# Patient Record
Sex: FEMALE | Race: WHITE | NOT HISPANIC OR LATINO | Employment: OTHER | URBAN - METROPOLITAN AREA
[De-identification: names, ages, dates, MRNs, and addresses within clinical notes are randomized per-mention and may not be internally consistent; named-entity substitution may affect disease eponyms.]

---

## 2017-01-25 ENCOUNTER — OFFICE VISIT (OUTPATIENT)
Dept: FAMILY MEDICINE | Facility: CLINIC | Age: 67
End: 2017-01-25
Payer: COMMERCIAL

## 2017-01-25 VITALS
WEIGHT: 195 LBS | RESPIRATION RATE: 16 BRPM | HEART RATE: 72 BPM | HEIGHT: 64 IN | SYSTOLIC BLOOD PRESSURE: 134 MMHG | OXYGEN SATURATION: 99 % | DIASTOLIC BLOOD PRESSURE: 68 MMHG | BODY MASS INDEX: 33.29 KG/M2 | TEMPERATURE: 97.5 F

## 2017-01-25 DIAGNOSIS — G91.9 HYDROCEPHALUS (H): ICD-10-CM

## 2017-01-25 DIAGNOSIS — R60.0 BILATERAL EDEMA OF LOWER EXTREMITY: Primary | ICD-10-CM

## 2017-01-25 DIAGNOSIS — N20.0 NEPHROLITHIASIS: ICD-10-CM

## 2017-01-25 PROCEDURE — 99214 OFFICE O/P EST MOD 30 MIN: CPT | Performed by: INTERNAL MEDICINE

## 2017-01-25 NOTE — MR AVS SNAPSHOT
After Visit Summary   1/25/2017    Jessica Qiu    MRN: [id number]           Patient Information     Date Of Birth          1950        Visit Information        Provider Department      1/25/2017 2:00 PM Charlene Huynh MD [facility]        Today's Diagnoses     Bilateral edema of lower extremity    -  1     Nephrolithiasis         Hydrocephalus            Follow-ups after your visit        Your next 10 appointments already scheduled     Jan 27, 2017  9:45 AM   LAB with  LAB   Saman Briceño ([facility] [place])    [address] [address] [address] [phone]           Patient must bring picture ID.  Patient should be prepared to give a urine specimen  Please do not eat 10-12 hours before your appointment if you are coming in fasting for labs on lipids, cholesterol, or glucose (sugar).  Pregnant women should follow their Care Team instructions. Water with medications is okay. Do not drink coffee or other fluids.   If you have concerns about taking  your medications, please ask at office or if scheduling via [employer], send a message by clicking on Secure Messaging, Message Your Care Team.              Future tests that were ordered for you today     Open Future Orders        Priority Expected Expires Ordered    Comprehensive metabolic panel Routine  3/25/2017 1/25/2017    Lipid panel reflex to direct LDL Routine  3/25/2017 1/25/2017    UA reflex to Microscopic and Culture Routine  3/25/2017 1/25/2017    Echocardiogram Complete Routine  1/25/2018 1/25/2017    CBC with platelets Routine  3/25/2017 1/25/2017            Who to contact     If you have questions or need follow up information about today's clinic visit or your schedule please contact [facility] directly at [phone].  Normal or non-critical lab and imaging results will be communicated to you by MyChart, letter or phone within 4 business days after the  "clinic has received the results. If you do not hear from us within 7 days, please contact the clinic through [employer] or phone. If you have a critical or abnormal lab result, we will notify you by phone as soon as possible.  Submit refill requests through [employer]book or call your pharmacy and they will forward the refill request to us. Please allow 3 business days for your refill to be completed.          Additional Information About Your Visit        [employer] Information     Rice[employer] gives you secure access to your electronic health record. If you see a primary care provider, you can also send messages to your care team and make appointments. If you have questions, please call your primary care clinic.  If you do not have a primary care provider, please call [phone] and they will assist you.        Care EveryWhere ID     This is your Care EveryWhere ID. This could be used by other organizations to access your [place] medical records  [id number]        Your Vitals Were     Pulse Temperature Respirations Height BMI (Body Mass Index) Pulse Oximetry    72 97.5  F (36.4  C) (Oral) 16 5' 3.5\" (1.613 m) 34.00 kg/m2 99%       Blood Pressure from Last 3 Encounters:   01/25/17 134/68   08/17/16 131/79   07/20/16 122/64    Weight from Last 3 Encounters:   01/25/17 195 lb (88.451 kg)   08/17/16 160 lb (72.576 kg)   07/20/16 184 lb 1.6 oz (83.507 kg)               Primary Care Provider Office Phone # Fax #    Charlene Huynh [phone]       [facility] [address] [facility] [address]        Thank you!     Thank you for choosing [facility]  for your care. Our goal is always to provide you with excellent care. Hearing back from our patients is one way we can continue to improve our services. Please take a few minutes to complete the written survey that you may receive in the mail after your visit with us. Thank you!             Your Updated Medication List - Protect " others around you: Learn how to safely use, store and throw away your medicines at www.disposemymeds.org.          This list is accurate as of: 1/25/17  2:39 PM.  Always use your most recent med list.                   Brand Name Dispense Instructions for use    allopurinol 300 MG tablet    ZYLOPRIM    90 tablet    Take 1 tablet (300 mg) by mouth daily       multivitamin, therapeutic with minerals Tabs tablet      Take 1 tablet by mouth daily       OMEGA-3 FISH OIL PO      Take 500 mg by mouth daily

## 2017-01-25 NOTE — PROGRESS NOTES
SUBJECTIVE:                                                    Jessica Qiu is a 66 year old female who presents to clinic today for the following health issues:      Peripheral Edema:   Onset: Monday morning     Description:   Increased swelling in lower leg up to the kneecap bilaterally. Legs, ankles, and feet are sensitive. Today, symptoms appear to be improved in comparison with Monday. When asked, the patient has been feeling chest congestion and shortness of breath.    Intensity: Moderate    Progression of Symptoms:  Worsening, Monday morning she tried to put her shoes on and they were very tight.    Accompanying Signs & Symptoms:  Swelling and tenderness   Previous history of similar problem:   No    Precipitating factors:   Worsening throughout the day, especially while on her feet.    Alleviating factors:  Improvement noticed with elevation - decreased swelling in the evening.      Therapies Tried and outcome: None specifically for swelling. Sudafed for nasal and chest congestion.      Medications:  The patient reports having stopped taking a couple of her medications. She has recently stopped taking Ditropan for overactive bladder, but still keeps a few on hand in case she needs it. Additionally, she has stopped taking Citalopram and notes that she feels better without it. When asked, she reported having stopped taking Buproprion a couple of years ago. The patient continues taking Allopurinol everyday to prevent additional incidents of nephrolithiasis.     Problem list and histories reviewed & adjusted, as indicated.  Additional history: as documented    Labs reviewed in EPIC  Problem list, Medication list, Allergies, and Medical/Social/Surgical histories reviewed in [facility] and updated as appropriate.      REVIEW OF SYSTEMS:  C: NEGATIVE for fever, chills, or change in weight  I: POSITIVE for skin sensitivity of the legs, ankles, and feet; NEGATIVE for worrisome rashes, lesions, or moles  E: NEGATIVE  "for vision changes or irritation  ENT: POSITIVE for nasal congestion; NEGATIVE for ear, mouth, or throat problems  R: POSITIVE for shortness of breath; NEGATIVE for significant cough  CV: POSITIVE for peripheral edema; NEGATIVE for chest pain or palpitations  : Hx of Nephrolithiasis - use of Allopurinol; Hx of Overactive Bladder- no longer taking Ditropan; NEGATIVE for unusual vaginal symptoms  GI: NEGATIVE for nausea, abdominal pain, heartburn, or change in bowel habits  M: NEGATIVE for significant arthralgias or myalgia  N: NEGATIVE for weakness, dizziness or paresthesias  P: hx of Anxiety and Depression - no longer taking Buproprion or Citalopram; NEGATIVE for changes in mood or affect    This document serves as a record of the services and decisions personally performed and made by Charlene Huynh MD. It was created on her behalf by Carlota Benitez, a trained medical scribe. The creation of this document is based the provider's statements to the medical scribe.  Carlota Benitez, January 25, 2017 2:12 PM     OBJECTIVE:                                                    /68 mmHg  Pulse 72  Temp(Src) 97.5  F (36.4  C) (Oral)  Resp 16  Ht 1.613 m (5' 3.5\")  Wt 88.451 kg (195 lb)  BMI 34.00 kg/m2  SpO2 99%  Body mass index is 34 kg/(m^2).    EXAM:  GENERAL: Patient appears healthy, alert and not distressed.  EYES: Eyes appear grossly normal to inspection, with normal conjunctivae and sclerae  NECK: No adenopathy present, no asymmetry, masses, or scars noted, thyroid is normal to palpation  RESP: Lungs are clear to auscultation - no rales, rhonchi or wheezes present  CV: Regular rate and rhythm, normal S1 S2 heart sounds, no ectopy, peripheral pulses normal, no carotid bruit; 1+ peripheral edema noted bilaterally  ABDOMEN: Soft, nontender, no hepatosplenomegaly, no masses, normal bowel sounds  MS: No gross musculoskeletal defects noted, no edema, gait is age appropriate without ataxia  SKIN: No suspicious " rashes, lesions, or moles.  NEURO: Patient exhibits normal strength and tone, normal speech and mentation  PSYCH: Mentation appears normal, affect is normal/bright      Diagnostic Test Results:  No results found for this or any previous visit (from the past 24 hour(s)).      ASSESSMENT/PLAN:                                                    (R60.0) Bilateral edema of lower extremity  (primary encounter diagnosis)  Comment: Symptoms began on Monday, but appear better today; Encouraged to monitor salt intake to prevent additional swelling; Labs ordered, patient will return Friday to complete.  Plan: Comprehensive metabolic panel, Lipid panel         reflex to direct LDL, UA reflex to Microscopic         and Culture, Echocardiogram Complete, CBC with platelets          (N20.0) Nephrolithiasis  Comment: Daily Allopurinol 300 MG tablet has been effective in preventing recurrence of kidney stones; No changes in medication or dosing.  Plan: Comprehensive metabolic panel          (G91.9) Hydrocephalus  Comment:  Shunt placed 3/24/2016 at [facility]; Shunt is effective; Currently uses a walker for ambulation and to decrease risk for falls; Will continue to monitor.  Plan: Comprehensive metabolic panel            MEDICATIONS:   No orders of the defined types were placed in this encounter.     Medications Discontinued During This Encounter   Medication Reason     buPROPion (ZYBAN) 150 MG 12 hr tablet Stopped by Patient     oxybutynin (DITROPAN) 5 MG tablet Stopped by Patient     citalopram (CELEXA) 20 MG tablet Stopped by Patient          - Continue other medications without change  FUTURE LABS:       - Schedule a fasting blood draw for Friday, January 27th.      The information in this document, created by a medical scribe for me, accurately reflects the services I personally performed and the decisions made by me. I have reviewed and approved this document for accuracy.  Dr. Charlene Huynh, 2:35 PM, January 25,  2017    Charlene Huynh MD  Internal Medicine  [facility]

## 2017-01-25 NOTE — NURSING NOTE
"Chief Complaint   Patient presents with     Edema     for the past 2 days have noted increased swelling in both legs up to just above the knees   skin feels sensitive and legs, ankles and feet are sensitive        Initial /68 mmHg  Pulse 72  Temp(Src) 97.5  F (36.4  C) (Oral)  Resp 16  Ht 5' 3.5\" (1.613 m)  Wt 195 lb (88.451 kg)  BMI 34.00 kg/m2  SpO2 99% Estimated body mass index is 34 kg/(m^2) as calculated from the following:    Height as of this encounter: 5' 3.5\" (1.613 m).    Weight as of this encounter: 195 lb (88.451 kg).  BP completed using cuff size: large    "

## 2017-01-27 DIAGNOSIS — G91.9 HYDROCEPHALUS (H): ICD-10-CM

## 2017-01-27 DIAGNOSIS — R60.0 BILATERAL EDEMA OF LOWER EXTREMITY: ICD-10-CM

## 2017-01-27 DIAGNOSIS — N20.0 NEPHROLITHIASIS: ICD-10-CM

## 2017-01-27 DIAGNOSIS — R53.83 FATIGUE, UNSPECIFIED TYPE: ICD-10-CM

## 2017-01-27 LAB
ALBUMIN UR-MCNC: NEGATIVE MG/DL
APPEARANCE UR: ABNORMAL
BACTERIA #/AREA URNS HPF: ABNORMAL /HPF
BILIRUB UR QL STRIP: NEGATIVE
COLOR UR AUTO: YELLOW
ERYTHROCYTE [DISTWIDTH] IN BLOOD BY AUTOMATED COUNT: 13.8 % (ref 10–15)
GLUCOSE UR STRIP-MCNC: NEGATIVE MG/DL
HCT VFR BLD AUTO: 42.7 % (ref 35–47)
HGB BLD-MCNC: 13.7 G/DL (ref 11.7–15.7)
HGB UR QL STRIP: ABNORMAL
KETONES UR STRIP-MCNC: NEGATIVE MG/DL
LEUKOCYTE ESTERASE UR QL STRIP: ABNORMAL
MCH RBC QN AUTO: 31.3 PG (ref 26.5–33)
MCHC RBC AUTO-ENTMCNC: 32.1 G/DL (ref 31.5–36.5)
MCV RBC AUTO: 98 FL (ref 78–100)
NITRATE UR QL: NEGATIVE
NON-SQ EPI CELLS #/AREA URNS LPF: ABNORMAL /LPF
PH UR STRIP: 5 PH (ref 5–7)
PLATELET # BLD AUTO: 228 10E9/L (ref 150–450)
RBC # BLD AUTO: 4.38 10E12/L (ref 3.8–5.2)
RBC #/AREA URNS AUTO: ABNORMAL /HPF (ref 0–2)
SP GR UR STRIP: 1.02 (ref 1–1.03)
URN SPEC COLLECT METH UR: ABNORMAL
UROBILINOGEN UR STRIP-ACNC: 0.2 EU/DL (ref 0.2–1)
WBC # BLD AUTO: 9.5 10E9/L (ref 4–11)
WBC #/AREA URNS AUTO: ABNORMAL /HPF (ref 0–2)

## 2017-01-27 PROCEDURE — 84443 ASSAY THYROID STIM HORMONE: CPT | Performed by: INTERNAL MEDICINE

## 2017-01-27 PROCEDURE — 81001 URINALYSIS AUTO W/SCOPE: CPT | Performed by: INTERNAL MEDICINE

## 2017-01-27 PROCEDURE — 85027 COMPLETE CBC AUTOMATED: CPT | Performed by: INTERNAL MEDICINE

## 2017-01-27 PROCEDURE — 36415 COLL VENOUS BLD VENIPUNCTURE: CPT | Performed by: INTERNAL MEDICINE

## 2017-01-27 PROCEDURE — 80061 LIPID PANEL: CPT | Performed by: INTERNAL MEDICINE

## 2017-01-27 PROCEDURE — 80053 COMPREHEN METABOLIC PANEL: CPT | Performed by: INTERNAL MEDICINE

## 2017-01-28 LAB
ALBUMIN SERPL-MCNC: 3.8 G/DL (ref 3.4–5)
ALP SERPL-CCNC: 72 U/L (ref 40–150)
ALT SERPL W P-5'-P-CCNC: 35 U/L (ref 0–50)
ANION GAP SERPL CALCULATED.3IONS-SCNC: 8 MMOL/L (ref 3–14)
AST SERPL W P-5'-P-CCNC: 28 U/L (ref 0–45)
BILIRUB SERPL-MCNC: 0.5 MG/DL (ref 0.2–1.3)
BUN SERPL-MCNC: 16 MG/DL (ref 7–30)
CALCIUM SERPL-MCNC: 9.4 MG/DL (ref 8.5–10.1)
CHLORIDE SERPL-SCNC: 107 MMOL/L (ref 94–109)
CHOLEST SERPL-MCNC: 259 MG/DL
CO2 SERPL-SCNC: 26 MMOL/L (ref 20–32)
CREAT SERPL-MCNC: 0.66 MG/DL (ref 0.52–1.04)
GFR SERPL CREATININE-BSD FRML MDRD: 90 ML/MIN/1.7M2
GLUCOSE SERPL-MCNC: 96 MG/DL (ref 70–99)
HDLC SERPL-MCNC: 59 MG/DL
LDLC SERPL CALC-MCNC: 157 MG/DL
NONHDLC SERPL-MCNC: 200 MG/DL
POTASSIUM SERPL-SCNC: 3.7 MMOL/L (ref 3.4–5.3)
PROT SERPL-MCNC: 7.4 G/DL (ref 6.8–8.8)
SODIUM SERPL-SCNC: 141 MMOL/L (ref 133–144)
TRIGL SERPL-MCNC: 217 MG/DL
TSH SERPL DL<=0.005 MIU/L-ACNC: 3.8 MU/L (ref 0.4–4)

## 2017-02-01 ENCOUNTER — HOSPITAL ENCOUNTER (OUTPATIENT)
Dept: CARDIOLOGY | Facility: CLINIC | Age: 67
Discharge: HOME | End: 2017-02-01
Attending: INTERNAL MEDICINE | Admitting: INTERNAL MEDICINE
Payer: COMMERCIAL

## 2017-02-01 DIAGNOSIS — R60.0 BILATERAL EDEMA OF LOWER EXTREMITY: ICD-10-CM

## 2017-02-01 PROCEDURE — 93306 TTE W/DOPPLER COMPLETE: CPT | Mod: 26 | Performed by: INTERNAL MEDICINE

## 2017-02-01 PROCEDURE — 93306 TTE W/DOPPLER COMPLETE: CPT

## 2017-02-03 ENCOUNTER — MYC MEDICAL ADVICE (OUTPATIENT)
Dept: FAMILY MEDICINE | Facility: CLINIC | Age: 67
End: 2017-02-03

## 2017-02-03 NOTE — TELEPHONE ENCOUNTER
Please see VANDOLAY message. Would like comments on ECHO results.     Sandra Barrientos RN, BSN, PHN

## 2017-02-09 NOTE — TELEPHONE ENCOUNTER
Patient calling back today, has not yet heard about results, please advise.  Rylee Ulloa, RN  Triage Nurse

## 2017-02-10 ENCOUNTER — TELEPHONE (OUTPATIENT)
Dept: FAMILY MEDICINE | Facility: CLINIC | Age: 67
End: 2017-02-10

## 2017-02-10 DIAGNOSIS — I35.1 AORTIC REGURGITATION: ICD-10-CM

## 2017-02-10 DIAGNOSIS — I77.810 DILATED AORTIC ROOT (H): Primary | ICD-10-CM

## 2017-02-10 NOTE — TELEPHONE ENCOUNTER
Placed Cardiology referral per verbal order DN. Patient informed of referral.    Danielle Matias RN

## 2017-02-14 ENCOUNTER — OFFICE VISIT (OUTPATIENT)
Dept: CARDIOLOGY | Facility: CLINIC | Age: 67
End: 2017-02-14
Attending: INTERNAL MEDICINE
Payer: COMMERCIAL

## 2017-02-14 VITALS
HEIGHT: 64 IN | BODY MASS INDEX: 32.78 KG/M2 | HEART RATE: 80 BPM | DIASTOLIC BLOOD PRESSURE: 70 MMHG | SYSTOLIC BLOOD PRESSURE: 124 MMHG | WEIGHT: 192 LBS

## 2017-02-14 DIAGNOSIS — M41.25 OTHER IDIOPATHIC SCOLIOSIS, THORACOLUMBAR REGION: ICD-10-CM

## 2017-02-14 DIAGNOSIS — I35.1 NONRHEUMATIC AORTIC VALVE INSUFFICIENCY: ICD-10-CM

## 2017-02-14 DIAGNOSIS — I71.20 THORACIC AORTIC ANEURYSM WITHOUT RUPTURE (H): ICD-10-CM

## 2017-02-14 DIAGNOSIS — R60.9 EDEMA, UNSPECIFIED TYPE: ICD-10-CM

## 2017-02-14 DIAGNOSIS — E78.5 HYPERLIPIDEMIA WITH TARGET LDL LESS THAN 130: Primary | ICD-10-CM

## 2017-02-14 PROCEDURE — 93000 ELECTROCARDIOGRAM COMPLETE: CPT | Performed by: INTERNAL MEDICINE

## 2017-02-14 PROCEDURE — 99204 OFFICE O/P NEW MOD 45 MIN: CPT | Mod: 25 | Performed by: INTERNAL MEDICINE

## 2017-02-14 RX ORDER — LACTOBACILLUS ACIDOPHILUS 1B CELL
TABLET ORAL
COMMUNITY
End: 2017-07-20

## 2017-02-14 NOTE — LETTER
2/14/2017    Charlene Huynh MD  [facility]  [address] [place] [address]    RE: Jessica Qiu       Dear Colleague,    I had the pleasure of seeing Jessica Qiu in the [facility].    CARDIOLOGY CONSULT    REASON FOR CONSULT: AI, thoracic aortic aneurysm    PRIMARY CARE PHYSICIAN:  Charlene Huynh    HISTORY OF PRESENT ILLNESS:  66-year-old female with no previous cardiac history is seen for evaluation of newly diagnosed aortic insufficiency and dilated ascending aorta.     Echocardiogram in May 2008 at [facility] showed preserved ejection fraction, trace aortic insufficiency, a positive bubble study, and an ascending thoracic aneurysm at 4.75 cm.  Subsequent thoracic MRA showed ascending aortic aneurysm of 4.6 x 4.3 cm, arch 2.7 cm, and descending aorta 2.5 cm.    Historically her blood pressure runs on the low side.  She has never been on treatment for hypertension.    At baseline patient does some light walking at a slow pace.  She has a history of hydrocephalus and has some weakness in her lower extremities.  She denies any chest pain, but does have mild dyspnea with exertion.    In the past few weeks she has noticed bilateral lower extremity edema of the ankles.  She denies any pain in her legs.  She admits to a 10 pound weight gain.    She recently underwent echocardiogram for lower extremity edema.  This showed normal left ventricular size, ejection fraction 60%, mild LVH, normal right ventricle, trileaflet aortic valve with moderate aortic insufficiency, ascending aorta is moderately dilated at 4.6 cm.     PAST MEDICAL HISTORY:  Past Medical History   Diagnosis Date     Cognitive decline 12/4/2014     Gait disorder 12/4/2014     Nephrolithiasis 12/4/2014       MEDICATIONS:  Current Outpatient Prescriptions   Medication     allopurinol (ZYLOPRIM) 300 MG tablet     multivitamin, therapeutic with minerals (THERA-VIT-M) TABS      "Omega-3 Fatty Acids (OMEGA-3 FISH OIL PO)     No current facility-administered medications for this visit.        ALLERGIES:  Allergies   Allergen Reactions     Simvastatin Muscle Pain (Myalgia)     extreme muscle and joint pains     Hmg-Coa-R Inhibitors      Muscle pain and stiffness     Pollen Extract      Pt. Has hayfever       SOCIAL HISTORY:  I have reviewed this patient's social history and updated it with pertinent information if needed. Jessica Qiu  reports that she has never smoked. She has never used smokeless tobacco. She reports that she drinks alcohol. She reports that she does not use illicit drugs.    FAMILY HISTORY:  I have reviewed this patient's family history and updated it with pertinent information if needed.   Family History   Problem Relation Age of Onset     CEREBROVASCULAR DISEASE Mother 38     brain aneurysm     Cancer - colorectal Father 82       REVIEW OF SYSTEMS:  Constitutional:  No weight loss, fever, chills, weakness or fatigue.  HEENT:  Eyes:  No visual loss, blurred vision, double vision or yellow sclerae. No hearing loss, sneezing, congestion, runny nose or sore throat.  Skin:  No rash or itching.  Cardiovascular: per HPI  Respiratory: per HPI  GI:  No anorexia, nausea, vomiting or diarrhea. No abdominal pain or blood.  :  No dysurea, hematuria  Neurologic:  No headache, dizziness, syncope, paralysis, ataxia, numbness or tingling in the extremities. No change in bowel or bladder control.  Musculoskeletal:  No muscle, back pain, joint pain or stiffness.  Hematologic:  No anemia, bleeding or bruising.  Lymphatics:  No enlarged nodes. No history of splenectomy.  Psychiatric:  No history of depression or anxiety.  Endocrine:  No reports of sweating, cold or heat intolerance. No polyuria or polydipsia.  Allergies:  No history of asthma, hives, eczema or rhinitis.    PHYSICAL EXAM:  /70  Pulse 80  Ht 1.613 m (5' 3.5\")  Wt 87.1 kg (192 lb)  BMI 33.48 kg/m2    Constitutional: " awake, alert, no distress  Eyes: PERRL, sclera nonicteric  ENT: trachea midline  Respiratory: Lungs clear bilaterally  Cardiovascular: Regular rate and rhythm, 2/6 diastolic decrescendo murmur at the upper sternal border, no systolic murmur  GI: nondistended, nontender, bowel sounds present  Lymph/Hematologic: no lymphadenopathy  Skin: dry, no rash  Musculoskeletal: good muscle tone, strength 5/5 in upper and lower extremities  Neurologic: no focal deficits  Neuropsychiatric: appropriate affact    DATA:  Labs:   January 27, 2017: Cholesterol 259, triglycerides 217, HDL 59, , potassium 3.7, creatinine 0.7    EKG: Feb 14, 2017: Sinus rhythm, rate 82, normal axis and intervals, no ST segment abnormalities    ASSESSMENT:  66-year-old female seen for aortic insufficiency and thoracic aortic aneurysm.  Her thoracic aortic aneurysm is unchanged in the past 9 years, measuring 4.6 cm.  There is certainly no indication for surgical intervention at this time.  Her aortic size index is 2.4 cm/m .  If this were ever to reach 5.5 or greater centimeters, repair would need to be discussed.    The aortic insufficiency is only in the moderate range.  It should not be causing any symptoms or lower extremity edema.  However it should be followed with serial echoes.     As for her lower extremity edema, she does not have any hypertension, but she did have some diastolic dysfunction parameters on her echo.  Lower extremity venous ultrasound will be done to rule out venous insufficiency.  If this is essentially normal, a low dose diuretic would likely help or edema.    RECOMMENDATIONS:  1.  Ascending thoracic aortic aneurysm  - Repeat echo in one year    2.  Moderate aortic insufficiency  - Echocardiogram in 1 year    3.  Lower extremity edema  - Bilateral lower extremity venous ultrasound  - If no evidence of venous insufficiency, trial of Lasix 20 mg daily    Follow-up in 1 year.    Demarcus Dumont MD  Cardiology - [facility]  Heart  Pager:  [phone]  Text Page  February 14, 2017    Thank you for allowing me to participate in the care of your patient.    Sincerely,     Demarcus Dumont MD     [facility]

## 2017-02-14 NOTE — MR AVS SNAPSHOT
After Visit Summary   2/14/2017    Jessica Qiu    MRN: [id number]           Patient Information     Date Of Birth          1950        Visit Information        Provider Department      2/14/2017 1:45 PM Demarcus Dumont MD [facility] [place]        Today's Diagnoses     Hyperlipidemia with target LDL less than 130    -  1    Other idiopathic scoliosis, thoracolumbar region- left sided         Nonrheumatic aortic valve insufficiency        Thoracic aortic aneurysm without rupture (H)        Edema, unspecified type           Follow-ups after your visit        Additional Services     Follow-Up with Cardiologist                 Your next 10 appointments already scheduled     Feb 24, 2017  7:30 AM CST   [facility]   [facility] ([facility]    [address]  [facility] [address] [phone]           1.  Please bring or wear a comfortable two-piece outfit. 2.  You may eat, drink and take your normal medicines. 3.  For any questions that cannot be answered, please contact the ordering physician ***Please check-in at the [place] Registration Office located in [address] in the [facility] building. When you are finished registering, please go to [address] and have a seat. The technician will call your name for the test.            Feb 24, 2017  9:00 AM CST   US LOWER EXTREMITY VENOUS COMPETENCY BILATERAL with RHECHVUS   [facility] ([facility]    [address]  [facility] [id number] [phone]           Please bring a list of your medicines (including vitamins, minerals and over-the-counter drugs). Also, tell your doctor about any allergies you may have. Wear comfortable clothes and leave your valuables at home.  You do not need to do anything special to prepare for your exam.  Please call  "the Imaging Department at your exam site with any questions.              Future tests that were ordered for you today     Open Future Orders        Priority Expected Expires Ordered    Follow-Up with Cardiologist Routine 2/14/2018 6/29/2018 2/14/2017    US Venous Competency Bilateral Routine 2/21/2017 2/14/2018 2/14/2017    Echocardiogram Routine 2/21/2017 2/14/2018 2/14/2017            Who to contact     If you have questions or need follow up information about today's clinic visit or your schedule please contact [facility] [place] directly at [phone].  Normal or non-critical lab and imaging results will be communicated to you by [employer], letter or phone within 4 business days after the clinic has received the results. If you do not hear from us within 7 days, please contact the clinic through [employer] or phone. If you have a critical or abnormal lab result, we will notify you by phone as soon as possible.  Submit refill requests through [employer] or call your pharmacy and they will forward the refill request to us. Please allow 3 business days for your refill to be completed.          Additional Information About Your Visit        MyChart Information     [employer] gives you secure access to your electronic health record. If you see a primary care provider, you can also send messages to your care team and make appointments. If you have questions, please call your primary care clinic.  If you do not have a primary care provider, please call [phone] and they will assist you.        Care EveryWhere ID     This is your Care EveryWhere ID. This could be used by other organizations to access your [place] medical records  [id number]        Your Vitals Were     Pulse Height BMI (Body Mass Index)             80 1.613 m (5' 3.5\") 33.48 kg/m2          Blood Pressure from Last 3 Encounters:   02/14/17 124/70   01/25/17 134/68   08/17/16 131/79    Weight from Last 3 Encounters: "   02/14/17 87.1 kg (192 lb)   01/25/17 88.5 kg (195 lb)   08/17/16 72.6 kg (160 lb)              We Performed the Following     EKG 12-lead complete w/read - Clinics (performed today)        Primary Care Provider Office Phone # Fax #    Charlene Huynh [phone]       [facility] [address]  [facility] [address]        Thank you!     Thank you for choosing [facility] [place]  for your care. Our goal is always to provide you with excellent care. Hearing back from our patients is one way we can continue to improve our services. Please take a few minutes to complete the written survey that you may receive in the mail after your visit with us. Thank you!             Your Updated Medication List - Protect others around you: Learn how to safely use, store and throw away your medicines at www.disposemymeds.org.          This list is accurate as of: 2/14/17  2:53 PM.  Always use your most recent med list.                   Brand Name Dispense Instructions for use    allopurinol 300 MG tablet    ZYLOPRIM    90 tablet    Take 1 tablet (300 mg) by mouth daily       multivitamin, therapeutic with minerals Tabs tablet      Take 1 tablet by mouth daily       OMEGA-3 FISH OIL PO      Take 500 mg by mouth daily       PROBIATA Tabs

## 2017-02-14 NOTE — PROGRESS NOTES
CARDIOLOGY CONSULT    REASON FOR CONSULT: AI, thoracic aortic aneurysm    PRIMARY CARE PHYSICIAN:  Charlene Huynh    HISTORY OF PRESENT ILLNESS:  66-year-old female with no previous cardiac history is seen for evaluation of newly diagnosed aortic insufficiency and dilated ascending aorta.     Echocardiogram in May 2008 at [facility] showed preserved ejection fraction, trace aortic insufficiency, a positive bubble study, and an ascending thoracic aneurysm at 4.75 cm.  Subsequent thoracic MRA showed ascending aortic aneurysm of 4.6 x 4.3 cm, arch 2.7 cm, and descending aorta 2.5 cm.    Historically her blood pressure runs on the low side.  She has never been on treatment for hypertension.    At baseline patient does some light walking at a slow pace.  She has a history of hydrocephalus and has some weakness in her lower extremities.  She denies any chest pain, but does have mild dyspnea with exertion.    In the past few weeks she has noticed bilateral lower extremity edema of the ankles.  She denies any pain in her legs.  She admits to a 10 pound weight gain.    She recently underwent echocardiogram for lower extremity edema.  This showed normal left ventricular size, ejection fraction 60%, mild LVH, normal right ventricle, trileaflet aortic valve with moderate aortic insufficiency, ascending aorta is moderately dilated at 4.6 cm.     PAST MEDICAL HISTORY:  Past Medical History   Diagnosis Date     Cognitive decline 12/4/2014     Gait disorder 12/4/2014     Nephrolithiasis 12/4/2014       MEDICATIONS:  Current Outpatient Prescriptions   Medication     allopurinol (ZYLOPRIM) 300 MG tablet     multivitamin, therapeutic with minerals (THERA-VIT-M) TABS     Omega-3 Fatty Acids (OMEGA-3 FISH OIL PO)     No current facility-administered medications for this visit.        ALLERGIES:  Allergies   Allergen Reactions     Simvastatin Muscle Pain (Myalgia)     extreme muscle and joint pains     Hmg-Coa-R Inhibitors       "Muscle pain and stiffness     Pollen Extract      Pt. Has hayfever       SOCIAL HISTORY:  I have reviewed this patient's social history and updated it with pertinent information if needed. Jessica Qiu  reports that she has never smoked. She has never used smokeless tobacco. She reports that she drinks alcohol. She reports that she does not use illicit drugs.    FAMILY HISTORY:  I have reviewed this patient's family history and updated it with pertinent information if needed.   Family History   Problem Relation Age of Onset     CEREBROVASCULAR DISEASE Mother 38     brain aneurysm     Cancer - colorectal Father 82       REVIEW OF SYSTEMS:  Constitutional:  No weight loss, fever, chills, weakness or fatigue.  HEENT:  Eyes:  No visual loss, blurred vision, double vision or yellow sclerae. No hearing loss, sneezing, congestion, runny nose or sore throat.  Skin:  No rash or itching.  Cardiovascular: per HPI  Respiratory: per HPI  GI:  No anorexia, nausea, vomiting or diarrhea. No abdominal pain or blood.  :  No dysurea, hematuria  Neurologic:  No headache, dizziness, syncope, paralysis, ataxia, numbness or tingling in the extremities. No change in bowel or bladder control.  Musculoskeletal:  No muscle, back pain, joint pain or stiffness.  Hematologic:  No anemia, bleeding or bruising.  Lymphatics:  No enlarged nodes. No history of splenectomy.  Psychiatric:  No history of depression or anxiety.  Endocrine:  No reports of sweating, cold or heat intolerance. No polyuria or polydipsia.  Allergies:  No history of asthma, hives, eczema or rhinitis.    PHYSICAL EXAM:  /70  Pulse 80  Ht 1.613 m (5' 3.5\")  Wt 87.1 kg (192 lb)  BMI 33.48 kg/m2    Constitutional: awake, alert, no distress  Eyes: PERRL, sclera nonicteric  ENT: trachea midline  Respiratory: Lungs clear bilaterally  Cardiovascular: Regular rate and rhythm, 2/6 diastolic decrescendo murmur at the upper sternal border, no systolic murmur  GI: nondistended, " nontender, bowel sounds present  Lymph/Hematologic: no lymphadenopathy  Skin: dry, no rash  Musculoskeletal: good muscle tone, strength 5/5 in upper and lower extremities  Neurologic: no focal deficits  Neuropsychiatric: appropriate affact    DATA:  Labs:   January 27, 2017: Cholesterol 259, triglycerides 217, HDL 59, , potassium 3.7, creatinine 0.7    EKG: Feb 14, 2017: Sinus rhythm, rate 82, normal axis and intervals, no ST segment abnormalities    ASSESSMENT:  66-year-old female seen for aortic insufficiency and thoracic aortic aneurysm.  Her thoracic aortic aneurysm is unchanged in the past 9 years, measuring 4.6 cm.  There is certainly no indication for surgical intervention at this time.  Her aortic size index is 2.4 cm/m .  If this were ever to reach 5.5 or greater centimeters, repair would need to be discussed.    The aortic insufficiency is only in the moderate range.  It should not be causing any symptoms or lower extremity edema.  However it should be followed with serial echoes.     As for her lower extremity edema, she does not have any hypertension, but she did have some diastolic dysfunction parameters on her echo.  Lower extremity venous ultrasound will be done to rule out venous insufficiency.  If this is essentially normal, a low dose diuretic would likely help or edema.    RECOMMENDATIONS:  1.  Ascending thoracic aortic aneurysm  - Repeat echo in one year    2.  Moderate aortic insufficiency  - Echocardiogram in 1 year    3.  Lower extremity edema  - Bilateral lower extremity venous ultrasound  - If no evidence of venous insufficiency, trial of Lasix 20 mg daily    Follow-up in 1 year.    Demarcus Dumont MD  Cardiology - [facility]  Pager:  [phone]  Text Page  February 14, 2017

## 2017-02-20 DIAGNOSIS — N20.0 NEPHROLITHIASIS: ICD-10-CM

## 2017-02-21 NOTE — TELEPHONE ENCOUNTER
allopurinol (ZYLOPRIM) 300 MG       Last Written Prescription Date: 11/18/15  Last Fill Quantity: 90, # refills: 3  Last Office Visit with [facility], [facility] or [facility] prescribing provider:  11/25/17        No results found for: URIC]  Creatinine   Date Value Ref Range Status   01/27/2017 0.66 0.52 - 1.04 mg/dL Final   ]  Lab Results   Component Value Date    WBC 9.5 01/27/2017     Lab Results   Component Value Date    RBC 4.38 01/27/2017     Lab Results   Component Value Date    HGB 13.7 01/27/2017     Lab Results   Component Value Date    HCT 42.7 01/27/2017     No components found for: MCT  Lab Results   Component Value Date    MCV 98 01/27/2017     Lab Results   Component Value Date    MCH 31.3 01/27/2017     Lab Results   Component Value Date    MCHC 32.1 01/27/2017     Lab Results   Component Value Date    RDW 13.8 01/27/2017     Lab Results   Component Value Date     01/27/2017     Lab Results   Component Value Date    AST 28 01/27/2017     Lab Results   Component Value Date    ALT 35 01/27/2017

## 2017-02-22 NOTE — TELEPHONE ENCOUNTER
Patient takes this to prevent kidney stones, no uric acid on file-future lab is ordered.   Routing refill request to provider for review/approval because:  Labs not current:  URIC. Other labs are current. Please sign if ok.   Rylee Ulloa, SHERRI  Triage Nurse

## 2017-02-23 RX ORDER — ALLOPURINOL 300 MG/1
300 TABLET ORAL DAILY
Qty: 90 TABLET | Refills: 3 | Status: SHIPPED | OUTPATIENT
Start: 2017-02-23 | End: 2017-08-03

## 2017-02-27 ENCOUNTER — RADIANT APPOINTMENT (OUTPATIENT)
Dept: VASCULAR ULTRASOUND | Facility: CLINIC | Age: 67
End: 2017-02-27
Attending: INTERNAL MEDICINE
Payer: COMMERCIAL

## 2017-02-27 DIAGNOSIS — R60.9 EDEMA, UNSPECIFIED TYPE: ICD-10-CM

## 2017-02-27 PROCEDURE — 93970 EXTREMITY STUDY: CPT | Performed by: INTERNAL MEDICINE

## 2017-03-02 ENCOUNTER — TELEPHONE (OUTPATIENT)
Dept: CARDIOLOGY | Facility: CLINIC | Age: 67
End: 2017-03-02

## 2017-03-02 DIAGNOSIS — I71.20 THORACIC AORTIC ANEURYSM (H): Primary | ICD-10-CM

## 2017-03-02 NOTE — TELEPHONE ENCOUNTER
3/1 pt left message at  requesting a call regarding results of her echo done 2/1/17. Last OV was 2/14 w/ Dr. Dumont, echo was reviewed at that time. 3/1 pt was scheduled for echo, however after reviewing Tim' OV note, it was noted her echo should be done in 1 year (2/2018), so the echo was cancelled. A new order was placed for echo in 2/2018. Message sent to Dr. Dumont updating him on this order.     Writer attempted to call pt today, pt was not home, left message with Bradly (her spouse) for her to call back if she still has questions per her message left at the  yesterday.   Brittney POLANCO

## 2017-03-02 NOTE — TELEPHONE ENCOUNTER
Pt called back. Wanted to know Dr. Almeida's plan after reviewing her LE venous ultrasound. Per Dr. Dumont' note to writer, he will review her LE venous study with the vascular team to discuss treatment options that would apply to her. Advised she would be called back with recommendations. Pt understood.   Brittney POLANCO

## 2017-03-06 ENCOUNTER — TELEPHONE (OUTPATIENT)
Dept: FAMILY MEDICINE | Facility: CLINIC | Age: 67
End: 2017-03-06

## 2017-03-06 DIAGNOSIS — F41.8 DEPRESSION WITH ANXIETY: Primary | ICD-10-CM

## 2017-03-06 RX ORDER — CITALOPRAM HYDROBROMIDE 20 MG/1
20 TABLET ORAL DAILY
Qty: 30 TABLET | Refills: 1 | Status: SHIPPED | OUTPATIENT
Start: 2017-03-06 | End: 2017-03-23

## 2017-03-06 ASSESSMENT — ANXIETY QUESTIONNAIRES
2. NOT BEING ABLE TO STOP OR CONTROL WORRYING: MORE THAN HALF THE DAYS
7. FEELING AFRAID AS IF SOMETHING AWFUL MIGHT HAPPEN: SEVERAL DAYS
1. FEELING NERVOUS, ANXIOUS, OR ON EDGE: SEVERAL DAYS
IF YOU CHECKED OFF ANY PROBLEMS ON THIS QUESTIONNAIRE, HOW DIFFICULT HAVE THESE PROBLEMS MADE IT FOR YOU TO DO YOUR WORK, TAKE CARE OF THINGS AT HOME, OR GET ALONG WITH OTHER PEOPLE: SOMEWHAT DIFFICULT
6. BECOMING EASILY ANNOYED OR IRRITABLE: NEARLY EVERY DAY
GAD7 TOTAL SCORE: 8
3. WORRYING TOO MUCH ABOUT DIFFERENT THINGS: SEVERAL DAYS
5. BEING SO RESTLESS THAT IT IS HARD TO SIT STILL: NOT AT ALL

## 2017-03-06 ASSESSMENT — PATIENT HEALTH QUESTIONNAIRE - PHQ9: 5. POOR APPETITE OR OVEREATING: NOT AT ALL

## 2017-03-06 NOTE — TELEPHONE ENCOUNTER
Patient is calling today with increased depression and anxiety. She weaned off of the Celexa at the end of January, and is feeling her symptoms have returned. Can you start her back on the Celexa 20 mg per day? She is going out of town tomorrow, and would like to start it today if possible. She will be gone for 2 weeks, and will follow up with you when she returns. PHQ 9 and RAMÍREZ 7 scores are updated. Appointment is scheduled for 3/23. Huddled with Dr. Huynh, she is ok with starting her on the Celexa at 10 mg for 6-8 days and then go to one per day. Patient is agreeable with doing this dose.  Rylee Ulloa, RN  Triage Nurse

## 2017-03-07 ASSESSMENT — PATIENT HEALTH QUESTIONNAIRE - PHQ9: SUM OF ALL RESPONSES TO PHQ QUESTIONS 1-9: 13

## 2017-03-07 ASSESSMENT — ANXIETY QUESTIONNAIRES: GAD7 TOTAL SCORE: 8

## 2017-03-23 ENCOUNTER — OFFICE VISIT (OUTPATIENT)
Dept: FAMILY MEDICINE | Facility: CLINIC | Age: 67
End: 2017-03-23
Payer: COMMERCIAL

## 2017-03-23 VITALS
WEIGHT: 192 LBS | RESPIRATION RATE: 14 BRPM | TEMPERATURE: 98.1 F | HEART RATE: 61 BPM | DIASTOLIC BLOOD PRESSURE: 60 MMHG | OXYGEN SATURATION: 96 % | SYSTOLIC BLOOD PRESSURE: 124 MMHG | BODY MASS INDEX: 33.48 KG/M2

## 2017-03-23 DIAGNOSIS — R60.0 EDEMA OF BOTH LEGS: ICD-10-CM

## 2017-03-23 DIAGNOSIS — F41.8 DEPRESSION WITH ANXIETY: Primary | ICD-10-CM

## 2017-03-23 DIAGNOSIS — N64.52 DISCHARGE OF BREAST: ICD-10-CM

## 2017-03-23 DIAGNOSIS — R41.89 COGNITIVE DECLINE: ICD-10-CM

## 2017-03-23 DIAGNOSIS — G91.9 HYDROCEPHALUS (H): ICD-10-CM

## 2017-03-23 DIAGNOSIS — I35.1 AORTIC VALVE INSUFFICIENCY, UNSPECIFIED ETIOLOGY: ICD-10-CM

## 2017-03-23 PROCEDURE — 99214 OFFICE O/P EST MOD 30 MIN: CPT | Performed by: INTERNAL MEDICINE

## 2017-03-23 RX ORDER — CITALOPRAM HYDROBROMIDE 20 MG/1
20 TABLET ORAL DAILY
Qty: 90 TABLET | Refills: 3 | Status: SHIPPED | OUTPATIENT
Start: 2017-03-23 | End: 2018-03-27

## 2017-03-23 NOTE — NURSING NOTE
"No chief complaint on file.      Initial /60 (BP Location: Right arm, Patient Position: Chair, Cuff Size: Adult Large)  Pulse 61  Temp 98.1  F (36.7  C) (Oral)  Resp 14  Wt 192 lb (87.1 kg)  SpO2 96%  BMI 33.48 kg/m2 Estimated body mass index is 33.48 kg/(m^2) as calculated from the following:    Height as of 2/14/17: 5' 3.5\" (1.613 m).    Weight as of this encounter: 192 lb (87.1 kg).  Medication Reconciliation: complete    "

## 2017-03-23 NOTE — PROGRESS NOTES
SUBJECTIVE:                                                    Jessica Qiu is a 66 year old female who presents to clinic today for the following health issues:    Swelling in feet and ankles: was seen in January States Echo and labs were good and not sure why she had the issue.   States symptoms have improved.      Depression Follow-up      Status since last visit: Improved     See PHQ-9 for current symptoms. Other associated symptoms: None    Complicating factors:   Significant life event: No   Current substance abuse: None  Anxiety or Panic symptoms: No  PHQ-9 SCORE 12/4/2014 6/22/2015 3/6/2017   Total Score 3 0 -   Total Score - - 13    not optimally controlled  PHQ-9  English PHQ-9   Any Language          Amount of exercise or physical activity: None    Problems taking medications regularly: No    Medication side effects: none    Diet: low salt    Problem list and histories reviewed & adjusted, as indicated.  Additional history: as documented  Labs reviewed in EPIC    BP Readings from Last 3 Encounters:   03/23/17 124/60   02/14/17 124/70   01/25/17 134/68    Wt Readings from Last 3 Encounters:   03/23/17 192 lb (87.1 kg)   02/14/17 192 lb (87.1 kg)   01/25/17 195 lb (88.5 kg)        Reviewed and updated as needed this visit by clinical staff  Tobacco  Allergies  Meds  Problems  Med Hx  Surg Hx  Fam Hx  Soc Hx        Reviewed and updated as needed this visit by Provider  Allergies  Meds  Problems       ROS:  CONSTITUTIONAL: NEGATIVE for fever, chills, change in weight  RESP: NEGATIVE for significant cough or SOB  CV: Hx ascending thoracic aortic aneurysm and moderate aortic insufficiency, Edema in feet and ankles has improved since 1/2017, followed with Dr. Dumont, Cardiology, Echo 2/1/17 normal, recommended repeat Echo in 1 year, NEGATIVE for chest pain or palpitations  GI: NEGATIVE for nausea, abdominal pain, heartburn, or change in bowel habits  BREAST: POSITIVE for clear leakage from  the breast since summer 2016  MUSCULOSKELETAL: NEGATIVE for significant arthralgias or myalgia  NEURO: NEGATIVE for weakness, dizziness or paresthesias  PSYCHIATRIC: Hx of depression with anxiety - use of citalopram, NEGATIVE for changes in mood or affect    This document serves as a record of the services and decisions personally performed and made by Charlene Huynh MD. It was created on her behalf by Josi Etienne, a trained medical scribe. The creation of this document is based on the provider's statements to the medical scribe.   Josi Etienne, 2:49 PM, March 23, 2017    OBJECTIVE:                                                    /60 (BP Location: Right arm, Patient Position: Chair, Cuff Size: Adult Large)  Pulse 61  Temp 98.1  F (36.7  C) (Oral)  Resp 14  Wt 192 lb (87.1 kg)  SpO2 96%  BMI 33.48 kg/m2  Body mass index is 33.48 kg/(m^2).  GENERAL APPEARANCE: healthy, alert and no distress  NECK: no bruits  RESP: lungs clear to auscultation - no rales, rhonchi or wheezes  CV: 1-2/6 diastolic murmur to right upper sternal border, regular rates and rhythm and normal S1 S2, no peripheral edema  MS: use of walker for ambulation, extremities normal- no gross deformities noted  NEURO:  Shunt catheter is noted and nontender; mentation intact and speech normal; using walker for stable gait and ambulation;  PSYCH:she is conversant; thought processes intact    Diagnostic Test Results:  Results for orders placed or performed in visit on 02/27/17   US Venous Competency Bilateral    Narrative    INDICATION: Bilateral lower extremity edema      Impression    Impression:  1. There is no evidence of deep vein thrombosis bilaterally.  2. There is a significant reflux in the right great saphenous vein  predominantly at the ankle. The left great saphenous vein is  competent.  3. There is a significant bilateral small saphenous vein reflux.      EXAM DESCRIPTION AND FINDINGS:  A noninvasive lower extremity venous  ultrasound exam was performed  using duplex imaging with spectral and color Doppler. The visualized  deep veins in the right and left lower extremity demonstrated normal  antegrade flow, respiratory phasicity, augmentation and  compressibility and no evidence of thrombosis. The right great  saphenous vein measures 5.4 mm at the junction to 4.0 mm at the ankle.  There is a significant reflux in the right great saphenous vein  predominantly around the ankle. The right small saphenous vein has  significant reflux. The left great saphenous vein measures 5.4 mm at  the junction to 3.6 mm at the ankle. The left great saphenous vein is  competent. The left small saphenous vein has significant reflux.      STUDY QUALITY: Good    [facility]  Dedicated to Excellence in Vascular Care    To schedule a consultation in the Vascular Diagnostic Clinic or  An examination in the Vascular Diagnostic Lab  Call [phone]. Option 2.        TOMMY BLANTON MD        ASSESSMENT/PLAN:                                                    (F41.8) Depression with anxiety (primary encounter diagnosis)  Comment: since 2010, medication well-tolerated and helpful, reviewed med/dosage, no change in medication  recommended  PHQ-9 SCORE 12/4/2014 6/22/2015 3/6/2017   Total Score 3 0 -   Total Score - - 13    running a bit higher; plan to remain on same dose.  Plan: citalopram (CELEXA) 20 MG tablet          (N64.52) Discharge of breast  Comment: July and August 2016 evaluation: mammo, US and Breast MRI; clear, intermittent; rec yearly mammo; if increased, could see breast surgeon  Plan: mammo ordered    (R60.0) Edema of both legs  Comment: since 1/2017, now resolved; reviewed ECHO done 2/1/2017  EF 60 %, closely monitoring sodium intake to prevent additional swelling  Plan: no change in medication; elevate, limit sodium intake    (I35.1) Aortic valve insufficiency, unspecified etiology  Comment: ECHO done 2/1/2017 and  Cardiology follow up on 2/14/2017 with Dr. Dumnot; CV compared with 2008   Plan: reviewed recent ECHO: recommended yearly Cardiology follow-up and Echo    MEDICATIONS:   Orders Placed This Encounter   Medications     citalopram (CELEXA) 20 MG tablet     Sig: Take 1 tablet (20 mg) by mouth daily     Dispense:  90 tablet     Refill:  3     Medications Discontinued During This Encounter   Medication Reason     citalopram (CELEXA) 20 MG tablet Reorder         Charlene Huynh MD  Internal Medicine  [facility] [place]    The information in this document, created by a medical scribe for me, accurately reflects the services I personally performed and the decisions made by me. I have reviewed and approved this document for accuracy.  Dr. Charlene Huynh, 3:00 PM, March 23, 2017

## 2017-03-23 NOTE — MR AVS SNAPSHOT
After Visit Summary   3/23/2017    Jessica Qiu    MRN: [id number]           Patient Information     Date Of Birth          1950        Visit Information        Provider Department      3/23/2017 1:45 PM Charlene Huynh MD [facility]        Today's Diagnoses     Depression with anxiety           Follow-ups after your visit        Who to contact     If you have questions or need follow up information about today's clinic visit or your schedule please contact [facility] directly at [phone].  Normal or non-critical lab and imaging results will be communicated to you by Teleporthart, letter or phone within 4 business days after the clinic has received the results. If you do not hear from us within 7 days, please contact the clinic through Signaturet or phone. If you have a critical or abnormal lab result, we will notify you by phone as soon as possible.  Submit refill requests through Beroomers or call your pharmacy and they will forward the refill request to us. Please allow 3 business days for your refill to be completed.          Additional Information About Your Visit        MyChart Information     Beroomers gives you secure access to your electronic health record. If you see a primary care provider, you can also send messages to your care team and make appointments. If you have questions, please call your primary care clinic.  If you do not have a primary care provider, please call [phone] and they will assist you.        Care EveryWhere ID     This is your Care EveryWhere ID. This could be used by other organizations to access your [facility] medical records  [id number]        Your Vitals Were     Pulse Temperature Respirations Pulse Oximetry BMI (Body Mass Index)       61 98.1  F (36.7  C) (Oral) 14 96% 33.48 kg/m2        Blood Pressure from Last 3 Encounters:   03/23/17 124/60   02/14/17 124/70   01/25/17 134/68    Weight from Last 3 Encounters:    03/23/17 192 lb (87.1 kg)   02/14/17 192 lb (87.1 kg)   01/25/17 195 lb (88.5 kg)              Today, you had the following     No orders found for display         Where to get your medicines      These medications were sent to [id number] - [place], [place] - [address]  [facility] [address] [address]     Phone:  [phone]     citalopram 20 MG tablet          Primary Care Provider Office Phone # Fax #    Charlene uHynh [phone]       [facility] [address]  [facility] [address]        Thank you!     Thank you for choosing [facility]  for your care. Our goal is always to provide you with excellent care. Hearing back from our patients is one way we can continue to improve our services. Please take a few minutes to complete the written survey that you may receive in the mail after your visit with us. Thank you!             Your Updated Medication List - Protect others around you: Learn how to safely use, store and throw away your medicines at www.disposemymeds.org.          This list is accurate as of: 3/23/17  2:55 PM.  Always use your most recent med list.                   Brand Name Dispense Instructions for use    allopurinol 300 MG tablet    ZYLOPRIM    90 tablet    Take 1 tablet (300 mg) by mouth daily       citalopram 20 MG tablet    celeXA    90 tablet    Take 1 tablet (20 mg) by mouth daily       multivitamin, therapeutic with minerals Tabs tablet      Take 1 tablet by mouth daily       OMEGA-3 FISH OIL PO      Take 500 mg by mouth daily       PROBIATA Tabs

## 2017-03-28 PROBLEM — F41.8 DEPRESSION WITH ANXIETY: Status: ACTIVE | Noted: 2017-03-23

## 2017-03-28 PROBLEM — F41.8 DEPRESSION WITH ANXIETY: Status: ACTIVE | Noted: 2017-03-28

## 2017-05-04 ENCOUNTER — OFFICE VISIT (OUTPATIENT)
Dept: FAMILY MEDICINE | Facility: CLINIC | Age: 67
End: 2017-05-04
Payer: COMMERCIAL

## 2017-05-04 VITALS
TEMPERATURE: 98.1 F | WEIGHT: 191 LBS | HEIGHT: 64 IN | HEART RATE: 79 BPM | RESPIRATION RATE: 17 BRPM | DIASTOLIC BLOOD PRESSURE: 79 MMHG | OXYGEN SATURATION: 96 % | BODY MASS INDEX: 32.61 KG/M2 | SYSTOLIC BLOOD PRESSURE: 136 MMHG

## 2017-05-04 DIAGNOSIS — R05.2 SUBACUTE COUGH: Primary | ICD-10-CM

## 2017-05-04 PROCEDURE — 99213 OFFICE O/P EST LOW 20 MIN: CPT | Performed by: PHYSICIAN ASSISTANT

## 2017-05-04 RX ORDER — FLUTICASONE PROPIONATE 50 MCG
1-2 SPRAY, SUSPENSION (ML) NASAL DAILY
Qty: 16 G | Refills: 3 | Status: ON HOLD | OUTPATIENT
Start: 2017-05-04 | End: 2020-05-27

## 2017-05-04 NOTE — PATIENT INSTRUCTIONS
Cough, Chronic, Uncertain Cause (Adult)    Everyone has had a cough as part of the common cold, flu, or bronchitis. This kind of cough occurs along with an achy feeling, low-grade fever, nasal and sinus congestion, and a scratchy or sore throat. This usually gets better in 2 to 3 weeks. A cough that lasts longer than 3 weeks may be due to other causes.  If your cough does not improve over the next 2 weeks, further testing may be needed. Follow up with your healthcare provider as advised. Cough suppressants may be recommended. Based on your exam today, the exact cause of your cough is not certain. Below are some common causes for persistent cough.  Smokers cough   Smoker s cough  doesn t go away. If you continue to smoke, it only gets worse. The cough is from irritation in the air passages. Talk to your healthcare provider about quitting. Medicines or nicotine-replacement products, like gum or the patch, may make quitting easier.  Postnasal drip  A cough that is worse at night may be due to postnasal drip. Excess mucus in the nose drains from the back of your nose to your throat. This triggers the cough reflex. Postnasal drip may be due to a sinus infection or allergy. Common allergens include dust, tobacco smoke (both inhaled and secondhand smoke), environmental pollutants, pollen, mold, pets, cleaning agents, room deodorizers, and chemical fumes. Over-the-counter antihistamines or decongestants may be helpful for allergies. A sinus infection may requires antibiotic treatment. See your healthcare provider if symptoms continue.  Medicines  Certain prescribed medicines can cause a chronic cough in some people:    ACE inhibitors for high blood pressure. These include benazepril, captopril, enalapril, fosinopril, lisinopril, quinapril, ramipril, and others.    Beta-blockers for high blood pressure and other conditions. These include propranolol, atenolol, metoprolol, nadolol, and others.  Let your healthcare provider  know if you are taking any of these.  Asthma  Cough may be the only sign of mild asthma. You may have tests to find out if asthma is causing your cough. You may also take asthma medicine on a trial basis.  Acid reflux (heartburn, GERD)   The esophagus is the tube that carries food from the mouth to the stomach. A valve at its lower end prevents stomach acids from flowing upward. If this valve does not work properly, acid from the stomach enters the esophagus. This may cause a burning pain in the upper abdomen or lower chest, belching, or cough. Symptoms are often worse when lying flat. Avoid eating or drinking before bedtime. Try using extra pillows to raise your upper body, or place 4-inch blocks under the head of your bed. You may try an over-the-counter antacid or an acid-blocking medicine such as famotidine, cimetidine, ranitidine, esomeprazole, lansoprazole, or omeprazole. Stronger medicines for this condition can be prescribed by your healthcare provider.  Follow-up care  Follow up with your healthcare provider, or as advised, if your cough does not improve. Further testing may be needed.  (Note: If an X-ray was taken, a specialist will review it. You will be notified of any new findings that may affect your care.)  When to seek medical advice  Call your healthcare provider right away if any of these occur:    Mild wheezing or difficulty breathing    Fever of 100.4 F (38 C) or higher, or as directed by your healthcare provider    Unexpected weight loss    Coughing up large amounts of colored sputum    Night sweats (sheets and pajamas get soaking wet)  Call 911, or get immediate medical care  Contact emergency services right away if any of these occur:    Coughing up blood    Moderate to severe trouble breathing or wheezing    [id number] [employer]. [address] [place] [address], [place] [address]. All rights reserved. This information is not intended as a substitute for professional medical care. Always  follow your healthcare professional's instructions.

## 2017-05-04 NOTE — MR AVS SNAPSHOT
After Visit Summary   5/4/2017    Jessica Qiu    MRN: [id number]           Patient Information     Date Of Birth          1950        Visit Information        Provider Department      5/4/2017 2:30 PM Brodie Majano PA-C [facility]        Today's Diagnoses     Subacute cough    -  1      Care Instructions      Cough, Chronic, Uncertain Cause (Adult)    Everyone has had a cough as part of the common cold, flu, or bronchitis. This kind of cough occurs along with an achy feeling, low-grade fever, nasal and sinus congestion, and a scratchy or sore throat. This usually gets better in 2 to 3 weeks. A cough that lasts longer than 3 weeks may be due to other causes.  If your cough does not improve over the next 2 weeks, further testing may be needed. Follow up with your healthcare provider as advised. Cough suppressants may be recommended. Based on your exam today, the exact cause of your cough is not certain. Below are some common causes for persistent cough.  Smokers cough   Smoker s cough  doesn t go away. If you continue to smoke, it only gets worse. The cough is from irritation in the air passages. Talk to your healthcare provider about quitting. Medicines or nicotine-replacement products, like gum or the patch, may make quitting easier.  Postnasal drip  A cough that is worse at night may be due to postnasal drip. Excess mucus in the nose drains from the back of your nose to your throat. This triggers the cough reflex. Postnasal drip may be due to a sinus infection or allergy. Common allergens include dust, tobacco smoke (both inhaled and secondhand smoke), environmental pollutants, pollen, mold, pets, cleaning agents, room deodorizers, and chemical fumes. Over-the-counter antihistamines or decongestants may be helpful for allergies. A sinus infection may requires antibiotic treatment. See your healthcare provider if symptoms continue.  Medicines  Certain prescribed  medicines can cause a chronic cough in some people:    ACE inhibitors for high blood pressure. These include benazepril, captopril, enalapril, fosinopril, lisinopril, quinapril, ramipril, and others.    Beta-blockers for high blood pressure and other conditions. These include propranolol, atenolol, metoprolol, nadolol, and others.  Let your healthcare provider know if you are taking any of these.  Asthma  Cough may be the only sign of mild asthma. You may have tests to find out if asthma is causing your cough. You may also take asthma medicine on a trial basis.  Acid reflux (heartburn, GERD)   The esophagus is the tube that carries food from the mouth to the stomach. A valve at its lower end prevents stomach acids from flowing upward. If this valve does not work properly, acid from the stomach enters the esophagus. This may cause a burning pain in the upper abdomen or lower chest, belching, or cough. Symptoms are often worse when lying flat. Avoid eating or drinking before bedtime. Try using extra pillows to raise your upper body, or place 4-inch blocks under the head of your bed. You may try an over-the-counter antacid or an acid-blocking medicine such as famotidine, cimetidine, ranitidine, esomeprazole, lansoprazole, or omeprazole. Stronger medicines for this condition can be prescribed by your healthcare provider.  Follow-up care  Follow up with your healthcare provider, or as advised, if your cough does not improve. Further testing may be needed.  (Note: If an X-ray was taken, a specialist will review it. You will be notified of any new findings that may affect your care.)  When to seek medical advice  Call your healthcare provider right away if any of these occur:    Mild wheezing or difficulty breathing    Fever of 100.4 F (38 C) or higher, or as directed by your healthcare provider    Unexpected weight loss    Coughing up large amounts of colored sputum    Night sweats (sheets and pajamas get soaking  "wet)  Call 911, or get immediate medical care  Contact emergency services right away if any of these occur:    Coughing up blood    Moderate to severe trouble breathing or wheezing    3476-2578 [employer]. [address] [place] [address], [place] [address]. All rights reserved. This information is not intended as a substitute for professional medical care. Always follow your healthcare professional's instructions.              Follow-ups after your visit        Who to contact     If you have questions or need follow up information about today's clinic visit or your schedule please contact [facility] directly at [phone].  Normal or non-critical lab and imaging results will be communicated to you by [employer], letter or phone within 4 business days after the clinic has received the results. If you do not hear from us within 7 days, please contact the clinic through [employer] or phone. If you have a critical or abnormal lab result, we will notify you by phone as soon as possible.  Submit refill requests through [employer] or call your pharmacy and they will forward the refill request to us. Please allow 3 business days for your refill to be completed.          Additional Information About Your Visit        MyChart Information     [employer] gives you secure access to your electronic health record. If you see a primary care provider, you can also send messages to your care team and make appointments. If you have questions, please call your primary care clinic.  If you do not have a primary care provider, please call [phone] and they will assist you.        Care EveryWhere ID     This is your Care EveryWhere ID. This could be used by other organizations to access your [facility] medical records  [id number]        Your Vitals Were     Pulse Temperature Respirations Height Pulse Oximetry Breastfeeding?    79 98.1  F (36.7  C) (Oral) 17 5' 3.5\" (1.613 m) 96% No    BMI (Body Mass Index)             "       33.3 kg/m2            Blood Pressure from Last 3 Encounters:   05/04/17 136/79   03/23/17 124/60   02/14/17 124/70    Weight from Last 3 Encounters:   05/04/17 191 lb (86.6 kg)   03/23/17 192 lb (87.1 kg)   02/14/17 192 lb (87.1 kg)              Today, you had the following     No orders found for display         Today's Medication Changes          These changes are accurate as of: 5/4/17  2:53 PM.  If you have any questions, ask your nurse or doctor.               Start taking these medicines.        Dose/Directions    fluticasone 50 MCG/ACT spray   Commonly known as:  FLONASE   Used for:  Subacute cough   Started by:  Brodie Majano PA-C        Dose:  1-2 spray   Spray 1-2 sprays into both nostrils daily   Quantity:  16 g   Refills:  3            Where to get your medicines      These medications were sent to [facility] #[id number] [place] [address]     Phone:  [phone]     fluticasone 50 MCG/ACT spray                Primary Care Provider Office Phone # Fax #    Charlene Huynh [phone]       [facility] [address] [facility]  [facility] [address]        Thank you!     Thank you for choosing [facility]  for your care. Our goal is always to provide you with excellent care. Hearing back from our patients is one way we can continue to improve our services. Please take a few minutes to complete the written survey that you may receive in the mail after your visit with us. Thank you!             Your Updated Medication List - Protect others around you: Learn how to safely use, store and throw away your medicines at www.disposemymeds.org.          This list is accurate as of: 5/4/17  2:53 PM.  Always use your most recent med list.                   Brand Name Dispense Instructions for use    allopurinol 300 MG tablet    ZYLOPRIM    90 tablet    Take 1 tablet (300 mg) by mouth daily        citalopram 20 MG tablet    celeXA    90 tablet    Take 1 tablet (20 mg) by mouth daily       fluticasone 50 MCG/ACT spray    FLONASE    16 g    Spray 1-2 sprays into both nostrils daily       multivitamin, therapeutic with minerals Tabs tablet      Take 1 tablet by mouth daily       OMEGA-3 FISH OIL PO      Take 500 mg by mouth daily       PRILOSEC PO          PROBIATA Tabs

## 2017-05-04 NOTE — PROGRESS NOTES
SUBJECTIVE:                                                    Jessica Qiu is a 66 year old female who presents to clinic today for the following health issues:      Acute Illness   Acute illness concerns: URI  Onset: few months    Fever: no    Chills/Sweats: YES    Headache (location?): YES    Sinus Pressure:YES    Conjunctivitis:  no    Ear Pain: YES: bilateral    Rhinorrhea: YES    Congestion: YES    Sore Throat: YES     Cough: YES-non-productive worse with lying down and also in the AM.     Wheeze: no    Decreased Appetite: no    Nausea: YES    Vomiting: no    Diarrhea:  no    Dysuria/Freq.: no    Fatigue/Achiness: YES    Sick/Strep Exposure: yes     Therapies Tried and outcome: Mucinex      Problem list and histories reviewed & adjusted, as indicated.  Additional history: as documented    Patient Active Problem List   Diagnosis     Gait disorder     Cognitive decline     Nephrolithiasis     Hyperlipidemia with target LDL less than 130     Other idiopathic scoliosis, thoracolumbar region- left sided      Hydrocephalus     Handicap Parking through 4/2017   shunt placed 3/24/2016 @ [place] for hydrocephalus; using walker , risk for falls     Aortic valve insufficiency, unspecified etiology     Depression with anxiety     Past Surgical History:   Procedure Laterality Date     BREAST SURGERY      breast biopsy     GENITOURINARY SURGERY      kidney stones     LITHOTRIPSY  2010    uric acid stone     TONSILLECTOMY      childhood       Social History   Substance Use Topics     Smoking status: Never Smoker     Smokeless tobacco: Never Used     Alcohol use 0.0 oz/week     0 Standard drinks or equivalent per week     Family History   Problem Relation Age of Onset     CEREBROVASCULAR DISEASE Mother 38     brain aneurysm     Cancer - colorectal Father 82         Current Outpatient Prescriptions   Medication Sig Dispense Refill     Omeprazole (PRILOSEC PO)        fluticasone (FLONASE) 50 MCG/ACT spray Spray 1-2 sprays  "into both nostrils daily 16 g 3     citalopram (CELEXA) 20 MG tablet Take 1 tablet (20 mg) by mouth daily 90 tablet 3     allopurinol (ZYLOPRIM) 300 MG tablet Take 1 tablet (300 mg) by mouth daily 90 tablet 3     Lactobacillus (PROBIATA) TABS        multivitamin, therapeutic with minerals (THERA-VIT-M) TABS Take 1 tablet by mouth daily       Omega-3 Fatty Acids (OMEGA-3 FISH OIL PO) Take 500 mg by mouth daily       Allergies   Allergen Reactions     Simvastatin Muscle Pain (Myalgia)     extreme muscle and joint pains     Hmg-Coa-R Inhibitors      Muscle pain and stiffness     Pollen Extract      Pt. Has hayfever     BP Readings from Last 3 Encounters:   05/04/17 136/79   03/23/17 124/60   02/14/17 124/70    Wt Readings from Last 3 Encounters:   05/04/17 191 lb (86.6 kg)   03/23/17 192 lb (87.1 kg)   02/14/17 192 lb (87.1 kg)                    Reviewed and updated as needed this visit by clinical staff  Tobacco  Allergies  Meds  Problems  Med Hx  Surg Hx  Fam Hx  Soc Hx        Reviewed and updated as needed this visit by Provider  Allergies  Meds  Problems         ROS:  Constitutional, HEENT, cardiovascular, pulmonary, gi and gu systems are negative, except as otherwise noted.    OBJECTIVE:                                                    /79 (BP Location: Right arm, Patient Position: Chair, Cuff Size: Adult Large)  Pulse 79  Temp 98.1  F (36.7  C) (Oral)  Resp 17  Ht 5' 3.5\" (1.613 m)  Wt 191 lb (86.6 kg)  SpO2 96%  Breastfeeding? No  BMI 33.3 kg/m2  Body mass index is 33.3 kg/(m^2).  GENERAL: healthy, alert and no distress  EYES: Eyes grossly normal to inspection, PERRL and conjunctivae and sclerae normal  HENT: normal cephalic/atraumatic, both ears: clear effusion, nasal mucosa edematous , oropharynx clear and oral mucous membranes moist. Post nasal drip noted.   NECK: no adenopathy, no asymmetry, masses, or scars and thyroid normal to palpation  RESP: lungs clear to auscultation - no " rales, rhonchi or wheezes  CV: regular rates and rhythm, normal S1 S2, no S3 or S4 and no murmur, click or rub  PSYCH: mentation appears normal, affect normal/bright    Diagnostic Test Results:  none      ASSESSMENT/PLAN:                                                      (R05) Subacute cough  (primary encounter diagnosis)  Comment: likely due to post nasal drip from allergies. Given age and history of edema, cardiac etiology was considered, but recent normal EF on echo is reassuring against this and pulm exam normal. Recommending flonase daily. If no improvement in 2 weeks, patient should RTC. Sooner if worsening.   Plan: fluticasone (FLONASE) 50 MCG/ACT spray        -Medication use and side effects discussed with the patient. Patient is in complete understanding and agreement with plan.         Follow up: as above     Brodie Majano PA-C  [facility]

## 2017-05-04 NOTE — NURSING NOTE
"Chief Complaint   Patient presents with     URI       Initial /79 (BP Location: Right arm, Patient Position: Chair, Cuff Size: Adult Large)  Pulse 79  Temp 98.1  F (36.7  C) (Oral)  Resp 17  Ht 5' 3.5\" (1.613 m)  Wt 191 lb (86.6 kg)  SpO2 96%  Breastfeeding? No  BMI 33.3 kg/m2 Estimated body mass index is 33.3 kg/(m^2) as calculated from the following:    Height as of this encounter: 5' 3.5\" (1.613 m).    Weight as of this encounter: 191 lb (86.6 kg).  Medication Reconciliation: complete Fabiana Zimmerman MA  Health Maintenance has been reviewed.       "

## 2017-07-20 ENCOUNTER — OFFICE VISIT (OUTPATIENT)
Dept: FAMILY MEDICINE | Facility: CLINIC | Age: 67
End: 2017-07-20
Payer: COMMERCIAL

## 2017-07-20 VITALS
DIASTOLIC BLOOD PRESSURE: 75 MMHG | TEMPERATURE: 98.2 F | HEART RATE: 76 BPM | BODY MASS INDEX: 33.65 KG/M2 | SYSTOLIC BLOOD PRESSURE: 134 MMHG | WEIGHT: 193 LBS

## 2017-07-20 DIAGNOSIS — Z78.0 ASYMPTOMATIC POSTMENOPAUSAL STATUS: ICD-10-CM

## 2017-07-20 DIAGNOSIS — M21.372 FOOT DROP, BILATERAL: ICD-10-CM

## 2017-07-20 DIAGNOSIS — J30.1 SEASONAL ALLERGIC RHINITIS DUE TO POLLEN, UNSPECIFIED CHRONICITY: Primary | ICD-10-CM

## 2017-07-20 DIAGNOSIS — M21.371 FOOT DROP, BILATERAL: ICD-10-CM

## 2017-07-20 DIAGNOSIS — Z23 NEED FOR PROPHYLACTIC VACCINATION AGAINST STREPTOCOCCUS PNEUMONIAE (PNEUMOCOCCUS): ICD-10-CM

## 2017-07-20 DIAGNOSIS — H10.13 ALLERGIC CONJUNCTIVITIS, BILATERAL: ICD-10-CM

## 2017-07-20 DIAGNOSIS — G91.9 HYDROCEPHALUS (H): ICD-10-CM

## 2017-07-20 PROCEDURE — 99214 OFFICE O/P EST MOD 30 MIN: CPT | Mod: 25 | Performed by: PHYSICIAN ASSISTANT

## 2017-07-20 PROCEDURE — 90471 IMMUNIZATION ADMIN: CPT | Performed by: PHYSICIAN ASSISTANT

## 2017-07-20 PROCEDURE — 90670 PCV13 VACCINE IM: CPT | Performed by: PHYSICIAN ASSISTANT

## 2017-07-20 RX ORDER — AZELASTINE HYDROCHLORIDE 0.5 MG/ML
1 SOLUTION/ DROPS OPHTHALMIC 2 TIMES DAILY
Qty: 1 BOTTLE | Refills: 5 | Status: SHIPPED | OUTPATIENT
Start: 2017-07-20 | End: 2022-01-18

## 2017-07-20 RX ORDER — FLUTICASONE PROPIONATE 50 MCG
1-2 SPRAY, SUSPENSION (ML) NASAL DAILY
Qty: 16 G | Refills: 11 | Status: SHIPPED | OUTPATIENT
Start: 2017-07-20 | End: 2022-01-18

## 2017-07-20 NOTE — PROGRESS NOTES
SUBJECTIVE:                                                    Jessica Qiu is a 66 year old female who presents to clinic today for the following health issues:    -renew handicap parking permit. Has history of hydrocephalus resulting in gait abnormality. Uses walker, but suffers from frequent intermittent bilateral foot drop. No dizziness or balance concerns.     -would like to discuss options for allergy meds. Uses otc antihistamines with little improvement. Uses flonase prn with great results but does not use this often.     ALLERGIES     Onset: years    Description:   Nasal congestion: YES  Sneezing: YES  Red, itchy eyes: YES    Progression of Symptoms:  same    Accompanying Signs & Symptoms:  Cough: no   Wheezing: no   Rash: no   Sinus/facial pain: YES- sinus pressure   History:   Is it seasonal: fall  History of Asthma: no   Has allergy testing been done: YES    Precipitating factors:   Dogs, dust    Alleviating factors:  None       Therapies Tried and outcome: OTC allergy meds        Problem list and histories reviewed & adjusted, as indicated.  Additional history: as documented    Patient Active Problem List   Diagnosis     Gait disorder     Cognitive decline     Nephrolithiasis     Hyperlipidemia with target LDL less than 130     Other idiopathic scoliosis, thoracolumbar region- left sided      Hydrocephalus     Handicap Parking through 7/2018   shunt placed 3/24/2016 @ [place] for hydrocephalus; using walker , risk for falls     Aortic valve insufficiency, unspecified etiology     Depression with anxiety     Foot drop, bilateral     Past Surgical History:   Procedure Laterality Date     BREAST SURGERY      breast biopsy     GENITOURINARY SURGERY      kidney stones     LITHOTRIPSY  2010    uric acid stone     TONSILLECTOMY      childhood       Social History   Substance Use Topics     Smoking status: Never Smoker     Smokeless tobacco: Never Used     Alcohol use 0.0 oz/week     0 Standard drinks or  equivalent per week     Family History   Problem Relation Age of Onset     CEREBROVASCULAR DISEASE Mother 38     brain aneurysm     Cancer - colorectal Father 82         Current Outpatient Prescriptions   Medication Sig Dispense Refill     azelastine (OPTIVAR) 0.05 % SOLN ophthalmic solution Apply 1 drop to eye 2 times daily 1 Bottle 5     fluticasone (FLONASE) 50 MCG/ACT spray Spray 1-2 sprays into both nostrils daily 16 g 11     fluticasone (FLONASE) 50 MCG/ACT spray Spray 1-2 sprays into both nostrils daily 16 g 3     citalopram (CELEXA) 20 MG tablet Take 1 tablet (20 mg) by mouth daily 90 tablet 3     allopurinol (ZYLOPRIM) 300 MG tablet Take 1 tablet (300 mg) by mouth daily 90 tablet 3     multivitamin, therapeutic with minerals (THERA-VIT-M) TABS Take 1 tablet by mouth daily       Omega-3 Fatty Acids (OMEGA-3 FISH OIL PO) Take 500 mg by mouth daily       Allergies   Allergen Reactions     Simvastatin Muscle Pain (Myalgia)     extreme muscle and joint pains     Hmg-Coa-R Inhibitors      Muscle pain and stiffness     Pollen Extract      Pt. Has hayfever     BP Readings from Last 3 Encounters:   07/20/17 134/75   05/04/17 136/79   03/23/17 124/60    Wt Readings from Last 3 Encounters:   07/20/17 193 lb (87.5 kg)   05/04/17 191 lb (86.6 kg)   03/23/17 192 lb (87.1 kg)                        Reviewed and updated as needed this visit by clinical staffTobacco  Allergies  Meds  Problems  Med Hx  Surg Hx  Fam Hx  Soc Hx        Reviewed and updated as needed this visit by Provider  Allergies  Meds  Problems         ROS:  Constitutional, HEENT, cardiovascular, pulmonary, gi and gu systems are negative, except as otherwise noted.      OBJECTIVE:   /75 (BP Location: Right arm, Patient Position: Chair, Cuff Size: Adult Large)  Pulse 76  Temp 98.2  F (36.8  C) (Oral)  Wt 193 lb (87.5 kg)  BMI 33.65 kg/m2  Body mass index is 33.65 kg/(m^2).  GENERAL: alert and no distress  EYES: Eyes grossly normal to  inspection, PERRL and conjunctivae and sclerae normal  RESP: lungs clear to auscultation - no rales, rhonchi or wheezes  CV: regular rates and rhythm, normal S1 S2, no S3 or S4 and no murmur, click or rub  NEURO: sensory exam grossly normal, mentation intact and gait abnormal: requiring walker to walk.   PSYCH: mentation appears normal, affect normal/bright    Diagnostic Test Results:  none     ASSESSMENT/PLAN:     (J30.1) Seasonal allergic rhinitis due to pollen, unspecified chronicity  (primary encounter diagnosis)  Comment: history of this. otc antihistamines not aiding in symptoms. flonase seems to work well. Recommending using this chronically and will given topical optivar for use for conjunctivitis. Discussed singulair as well, but will hold this unless symptoms remain uncontrolled.   Plan: fluticasone (FLONASE) 50 MCG/ACT spray        -Medication use and side effects discussed with the patient. Patient is in complete understanding and agreement with plan.       (H10.13) Allergic conjunctivitis, bilateral  Comment: as above   Plan: azelastine (OPTIVAR) 0.05 % SOLN ophthalmic         solution, fluticasone (FLONASE) 50 MCG/ACT         spray        -Medication use and side effects discussed with the patient. Patient is in complete understanding and agreement with plan.       (M21.371,  M21.372) Foot drop, bilateral  Comment: history of this secondary to hydrocephalus and shunt placement. AFOs will likely help. Will have see orthotics for further evaluation for this bilaterally.   Plan: ORTHOTICS REFERRAL            (G91.9) Hydrocephalus  Comment: as above. Yearly form filled out and submitted for abstracting.   Plan: ORTHOTICS REFERRAL            (Z78.0) Asymptomatic postmenopausal status  Comment:   Plan: DEXA HIP/PELVIS/SPINE - Future            (Z23) Need for prophylactic vaccination against Streptococcus pneumoniae (pneumococcus)  Comment:   Plan: Pneumococcal vaccine 13 valent PCV13 IM         (Prevnar)  [78380], ADMIN: Vaccine, Initial         (15602)              Follow up: prn. At least yearly.     Brodie Majano PA-C  [facility]

## 2017-07-20 NOTE — MR AVS SNAPSHOT
After Visit Summary   7/20/2017    Jessica Qiu    MRN: [id number]           Patient Information     Date Of Birth          1950        Visit Information        Provider Department      7/20/2017 1:00 PM Brodie Majano PA-C [facility]        Today's Diagnoses     Allergic conjunctivitis, bilateral    -  1    Asymptomatic postmenopausal status        Need for prophylactic vaccination against Streptococcus pneumoniae (pneumococcus)        Seasonal allergic rhinitis due to pollen, unspecified chronicity        Foot drop, bilateral        Hydrocephalus           Follow-ups after your visit        Additional Services     ORTHOTICS REFERRAL       **This referral order prints off in the [facility]  (Orthotics & Prosthetics) Central Scheduling Office**    The [facility] Central Scheduling Staff will contact the patient to schedule appointments.     Central Scheduling Contact Information: [phone] ([place])    Orthotics: Bilateral AFO (Ankle Foot Orthosis)     Please be aware that coverage of these services is subject to the terms and limitations of your health insurance plan.  Call member services at your health plan with any benefit or coverage questions.      Please bring the following to your appointment:    >>   Any x-rays, CTs or MRIs which have been performed.  Contact the facility where they were done to arrange for  prior to your scheduled appointment.    >>   List of current medications   >>   This referral request   >>   Any documents/labs given to you for this referral                  Future tests that were ordered for you today     Open Future Orders        Priority Expected Expires Ordered    DEXA HIP/PELVIS/SPINE - Future Routine  7/20/2018 7/20/2017            Who to contact     If you have questions or need follow up information about today's clinic visit or your schedule please contact [facility]  directly at [phone].  Normal or non-critical lab and imaging results will be communicated to you by [employer]on[employer], letter or phone within 4 business days after the clinic has received the results. If you do not hear from us within 7 days, please contact the clinic through M[employer]on[employer]art or phone. If you have a critical or abnormal lab result, we will notify you by phone as soon as possible.  Submit refill requests through [employer] or call your pharmacy and they will forward the refill request to us. Please allow 3 business days for your refill to be completed.          Additional Information About Your Visit        Milyon[employer]art Information     [employer] gives you secure access to your electronic health record. If you see a primary care provider, you can also send messages to your care team and make appointments. If you have questions, please call your primary care clinic.  If you do not have a primary care provider, please call [phone] and they will assist you.        Care EveryWhere ID     This is your Care EveryWhere ID. This could be used by other organizations to access your [place] medical records  [id number]        Your Vitals Were     Pulse Temperature BMI (Body Mass Index)             76 98.2  F (36.8  C) (Oral) 33.65 kg/m2          Blood Pressure from Last 3 Encounters:   07/20/17 134/75   05/04/17 136/79   03/23/17 124/60    Weight from Last 3 Encounters:   07/20/17 193 lb (87.5 kg)   05/04/17 191 lb (86.6 kg)   03/23/17 192 lb (87.1 kg)              We Performed the Following     ADMIN: Vaccine, Initial (83465)     DEPRESSION ACTION PLAN (DAP)     ORTHOTICS REFERRAL     Pneumococcal vaccine 13 valent PCV13 IM (Prevnar) [28075]          Today's Medication Changes          These changes are accurate as of: 7/20/17  1:43 PM.  If you have any questions, ask your nurse or doctor.               Start taking these medicines.        Dose/Directions    azelastine 0.05 % Soln ophthalmic solution   Commonly known as:   OPTIVAR   Used for:  Allergic conjunctivitis, bilateral   Started by:  Brodie Majano PA-C        Dose:  1 drop   Apply 1 drop to eye 2 times daily   Quantity:  1 Bottle   Refills:  5         These medicines have changed or have updated prescriptions.        Dose/Directions    * fluticasone 50 MCG/ACT spray   Commonly known as:  FLONASE   This may have changed:  Another medication with the same name was added. Make sure you understand how and when to take each.   Used for:  Subacute cough   Changed by:  Brodie Majano PA-C        Dose:  1-2 spray   Spray 1-2 sprays into both nostrils daily   Quantity:  16 g   Refills:  3       * fluticasone 50 MCG/ACT spray   Commonly known as:  FLONASE   This may have changed:  You were already taking a medication with the same name, and this prescription was added. Make sure you understand how and when to take each.   Used for:  Seasonal allergic rhinitis due to pollen, unspecified chronicity, Allergic conjunctivitis, bilateral   Changed by:  Brodie Majano PA-C        Dose:  1-2 spray   Spray 1-2 sprays into both nostrils daily   Quantity:  16 g   Refills:  11       * Notice:  This list has 2 medication(s) that are the same as other medications prescribed for you. Read the directions carefully, and ask your doctor or other care provider to review them with you.      Stop taking these medicines if you haven't already. Please contact your care team if you have questions.     PRILOSEC PO   Stopped by:  Brodie Majano PA-C           PROBIATA Tabs   Stopped by:  Brodie Majano PA-C                Where to get your medicines      These medications were sent to [id number] - [address] [address]     Phone:  [phone]     azelastine 0.05 % Soln ophthalmic solution    fluticasone 50 MCG/ACT spray                Primary Care Provider Office Phone # Fax #    Brodie Majano PA-C  [phone]       [facility] [address]  [facility] [address]        Equal Access to Services     NEEMA DAVALOS : Hadii aad ku haddanyel Domínguez, wayonatanda luqgayle, chayota katiada ama, baylee putnamjatinder hartman. So [facility] [phone].    ATENCIÓN: Si habla español, tiene a adame disposición servicios gratuitos de asistencia lingüística. Llame al [phone].    We comply with applicable federal civil rights laws and [place] laws. We do not discriminate on the basis of race, color, national origin, age, disability sex, sexual orientation or gender identity.            Thank you!     Thank you for choosing [facility]  for your care. Our goal is always to provide you with excellent care. Hearing back from our patients is one way we can continue to improve our services. Please take a few minutes to complete the written survey that you may receive in the mail after your visit with us. Thank you!             Your Updated Medication List - Protect others around you: Learn how to safely use, store and throw away your medicines at www.disposemymeds.org.          This list is accurate as of: 7/20/17  1:43 PM.  Always use your most recent med list.                   Brand Name Dispense Instructions for use Diagnosis    allopurinol 300 MG tablet    ZYLOPRIM    90 tablet    Take 1 tablet (300 mg) by mouth daily    Nephrolithiasis       azelastine 0.05 % Soln ophthalmic solution    OPTIVAR    1 Bottle    Apply 1 drop to eye 2 times daily    Allergic conjunctivitis, bilateral       citalopram 20 MG tablet    celeXA    90 tablet    Take 1 tablet (20 mg) by mouth daily    Depression with anxiety       * fluticasone 50 MCG/ACT spray    FLONASE    16 g    Spray 1-2 sprays into both nostrils daily    Subacute cough       * fluticasone 50 MCG/ACT spray    FLONASE    16 g    Spray 1-2 sprays into both nostrils daily    Seasonal allergic rhinitis due to pollen,  unspecified chronicity, Allergic conjunctivitis, bilateral       multivitamin, therapeutic with minerals Tabs tablet      Take 1 tablet by mouth daily        OMEGA-3 FISH OIL PO      Take 500 mg by mouth daily        * Notice:  This list has 2 medication(s) that are the same as other medications prescribed for you. Read the directions carefully, and ask your doctor or other care provider to review them with you.

## 2017-07-20 NOTE — NURSING NOTE
"  Chief Complaint   Patient presents with     Allergies     discuss options for meds       Initial /75 (BP Location: Right arm, Patient Position: Chair, Cuff Size: Adult Large)  Pulse 76  Temp 98.2  F (36.8  C) (Oral)  Wt 193 lb (87.5 kg)  BMI 33.65 kg/m2 Estimated body mass index is 33.65 kg/(m^2) as calculated from the following:    Height as of 5/4/17: 5' 3.5\" (1.613 m).    Weight as of this encounter: 193 lb (87.5 kg).  Medication Reconciliation: complete      FLAVIA Mark    "

## 2017-07-21 ASSESSMENT — PATIENT HEALTH QUESTIONNAIRE - PHQ9: SUM OF ALL RESPONSES TO PHQ QUESTIONS 1-9: 2

## 2017-07-31 ENCOUNTER — TELEPHONE (OUTPATIENT)
Dept: FAMILY MEDICINE | Facility: CLINIC | Age: 67
End: 2017-07-31

## 2017-07-31 DIAGNOSIS — M67.00 SHORT ACHILLES TENDON, UNSPECIFIED LATERALITY: Primary | ICD-10-CM

## 2017-07-31 NOTE — TELEPHONE ENCOUNTER
Patient calling and states saw Ortho for her foot drop.  States they recommended PT.  Asked if they are ordering or if they are asking us to order.  She thinks they are wanting us to order.  Please advise.  Call her back at [phone].  Yola Cool RN

## 2017-08-01 PROBLEM — M67.00: Status: ACTIVE | Noted: 2017-08-01

## 2017-08-01 NOTE — TELEPHONE ENCOUNTER
pt said she goes to [facility] orthotics,  they don't chart in Epic   They noted tight dana plantar flexors, recommend stretching  They discussed using [facility] on [facility] [phone]    Route to provider to review and advise    Yuliana RN Nurse Triage

## 2017-08-01 NOTE — TELEPHONE ENCOUNTER
Can we call and verify this with her ortho clinic? This doesn't sound right. They usually are the ones to order physical therapy if they recommend it.     -Jean-Claude Majano, PAC

## 2017-08-03 DIAGNOSIS — N20.0 NEPHROLITHIASIS: ICD-10-CM

## 2017-08-03 RX ORDER — ALLOPURINOL 300 MG/1
300 TABLET ORAL DAILY
Qty: 90 TABLET | Refills: 3 | Status: SHIPPED | OUTPATIENT
Start: 2017-08-03

## 2017-08-03 NOTE — TELEPHONE ENCOUNTER
Allopurinol       Last Written Prescription Date: 02/27/2017  Last Fill Quantity: 90, # refills: 3  Last Office Visit with [facility], [facility] or [facility] prescribing provider:  07/20/2017-Jean-Claude Majano        No results found for: URIC]  Creatinine   Date Value Ref Range Status   01/27/2017 0.66 0.52 - 1.04 mg/dL Final   ]  Lab Results   Component Value Date    WBC 9.5 01/27/2017     Lab Results   Component Value Date    RBC 4.38 01/27/2017     Lab Results   Component Value Date    HGB 13.7 01/27/2017     Lab Results   Component Value Date    HCT 42.7 01/27/2017     No components found for: MCT  Lab Results   Component Value Date    MCV 98 01/27/2017     Lab Results   Component Value Date    MCH 31.3 01/27/2017     Lab Results   Component Value Date    MCHC 32.1 01/27/2017     Lab Results   Component Value Date    RDW 13.8 01/27/2017     Lab Results   Component Value Date     01/27/2017     Lab Results   Component Value Date    AST 28 01/27/2017     Lab Results   Component Value Date    ALT 35 01/27/2017

## 2017-08-03 NOTE — TELEPHONE ENCOUNTER
Prescription approved per [facility] Refill Protocol.  Patricia Schilling RN

## 2017-08-07 ENCOUNTER — THERAPY VISIT (OUTPATIENT)
Dept: PHYSICAL THERAPY | Facility: CLINIC | Age: 67
End: 2017-08-07
Payer: COMMERCIAL

## 2017-08-07 DIAGNOSIS — R26.89 BALANCE PROBLEMS: ICD-10-CM

## 2017-08-07 DIAGNOSIS — R26.9 ABNORMAL GAIT: Primary | ICD-10-CM

## 2017-08-07 PROCEDURE — 97110 THERAPEUTIC EXERCISES: CPT | Mod: GP | Performed by: PHYSICAL THERAPIST

## 2017-08-07 PROCEDURE — G8979 MOBILITY GOAL STATUS: HCPCS | Mod: GP | Performed by: PHYSICAL THERAPIST

## 2017-08-07 PROCEDURE — 97161 PT EVAL LOW COMPLEX 20 MIN: CPT | Mod: GP | Performed by: PHYSICAL THERAPIST

## 2017-08-07 PROCEDURE — G8978 MOBILITY CURRENT STATUS: HCPCS | Mod: GP | Performed by: PHYSICAL THERAPIST

## 2017-08-07 NOTE — LETTER
MRAIANA HEADLEY [facility]  [address]  [place] [address]  [phone]    2017  Re: Jessica Qiu   :   1950  MRN:  [id number]   REFERRING PHYSICIAN:   Brodie RAJAN PT  Date of Initial Evaluation:  2017  Visits:  Rxs Used: 1  Reason for Referral:     Abnormal gait  Balance problems    EVALUATION SUMMARY  Jessica Qiu is a 66 year old female patient presenting to Physical Therapy with the following Primary Symptoms: Bilateral ankle stiffness and loss of balance, tripping over surfaces.  She uses rolling walker.  She reports near-falling approx 2x/day,  Reports actual falls approximately once every two weeks.  She does have some tingling in both feet and toes, into the lower legs, lessens to the point of none above the knees.  Numbness is described as constant.   She denies having painful cough/sneeze, saddle anaesthesia, severe night pain, peripheral motor deficit, recent bowel/bladder change, recent vision change, ringing in the ears or pain with swallowing. Pain is rated 0/10 at rest, and 0/10 at worst. History of symptoms: These pains began gradually over 7-8 years ago from no specific episode or injury.  Previous treatment has included PT to address balance and stiffness.   Since onset, these pains are getting worse :  Current Symptoms are worsened by: end of day, fatigue, evening time. Walking without walker difficulty going down stairs one at a time and with railing, stepping over high curb, weakness getting in and out of car on her own, going up stairs is fine Current Symptoms are relieved by rolling walking, using railings, shoe orthotics.   Recent surgical procedure: none relevant to her stiff/weak ankles   General health as reported by patient is:  good.  Pertinent medical history: OA, overweight, implanted shunt in brain for hydrocephalus, numbness in LE's.  She denies any significant current illness or recent hospital  admissions. She denies any other regonal implanted devices.  Current occupational status: retired. Previous functional status:  fully functional prior to pain onset/injury.   Her goal is to walk without a walker.    Objective:  Gait:  Flat foot, toe out mild wide ROSA ELENA  Gait Type:  Antalgic   Assistive Devices:  Walker  Deviations:  General Deviations:  Marcela decr, stance time decr and stride length decr    Ankle/Foot Evaluation  ROM:    AROM:    Dorsiflexion:  Left:   -15  Right:   -10  Plantarflexion:  Left:  NL    Right:  NL  PROM:    Dorsiflexion:  Left:    0     Right:   0   Strength:    Dorsiflexion:  Left: 3-/5     Pain:   Right: 3-/5   Pain:  Plantarflexion: Left: 4+/5   Pain:   Right: 4+/5  Pain:          Re: Jessica Qiu   :   1950    LIGAMENT TESTING: not assessed  SPECIAL TESTS: Special tests ankle: mild sign of clonus L>R with foot DF overpressure.  EDEMA: normal  MOBILITY TESTING:   Talocrural Left: hypomobile    Talocrural Right: hypomobile    Assessment/Plan:    Patient is a 66 year old female with both sides ankle complaints.    Patient has the following significant findings with corresponding treatment plan.                Diagnosis 1:  Weakness, balance deficit  Decreased ROM/flexibility - manual therapy and therapeutic exercise  Decreased joint mobility - manual therapy and therapeutic exercise  Decreased strength - therapeutic exercise and therapeutic activities  Impaired balance - neuro re-education and therapeutic activities  Decreased proprioception - neuro re-education and therapeutic activities  Impaired gait - gait training  Impaired muscle performance - neuro re-education  Decreased function - therapeutic activities  Instability -  Therapeutic Activity  Therapeutic Exercise    Therapy Evaluation Codes:   1) History comprised of:   Personal factors that impact the plan of care:      Time since onset of symptoms.    Comorbidity factors that impact the plan of care are:       Implanted device and hydrocephalus with shunt placed.     Medications impacting care: None.  2) Examination of Body Systems comprised of:   Body structures and functions that impact the plan of care:      Ankle.   Activity limitations that impact the plan of care are:      Dressing, Lifting, Squatting/kneeling, Stairs, Standing and Walking.  3) Clinical presentation characteristics are:   Stable/Uncomplicated.  4) Decision-Making    Low complexity using standardized patient assessment instrument and/or measureable assessment of functional outcome.                                  Re: Jessica Qiu   :   1950    Cumulative Therapy Evaluation is: Low complexity.  Previous and current functional limitations:  (See Goal Flow Sheet for this information)    Short term and Long term goals: (See Goal Flow Sheet for this information)   Communication ability:  Patient appears to be able to clearly communicate and understand verbal and written communication and follow directions correctly.  Treatment Explanation - The following has been discussed with patient: RX ordered/plan of care  Anticipated outcomes  Possible risks and side effects  This patient would benefit from PT intervention to resume normal activities.   Rehab potential is good.  Frequency:  1 X week, once daily  Duration:  for 8 weeks  Discharge Plan:  Achieve all LTG.  Independent in home treatment program.  Reach maximal therapeutic benefit.      Thank you for your referral.    INQUIRIES  Therapist: Paul Breyen, MA, PT, OCS, Cert. CHARLY PEÑA [facility]  [address] [place] [address]  Phone: [phone] Fax: [phone]

## 2017-08-07 NOTE — PROGRESS NOTES
Jessica Qiu is a 66 year old female patient presenting to Physical Therapy with the following Primary Symptoms: Bilateral ankle stiffness and loss of balance, tripping over surfaces.  She uses rolling walker.  She reports near-falling approx 2x/day,  Reports actual falls approximately once every two weeks.  She does have some tingling in both feet and toes, into the lower legs, lessens to the point of none above the knees.  Numbness is described as constant.   She denies having painful cough/sneeze, saddle anaesthesia, severe night pain, peripheral motor deficit, recent bowel/bladder change, recent vision change, ringing in the ears or pain with swallowing. Pain is rated 0/10 at rest, and 0/10 at worst. History of symptoms: These pains began gradually over 7-8 years ago from no specific episode or injury.  Previous treatment has included PT to address balance and stiffness.   Since onset, these pains are getting worse :  Current Symptoms are worsened by: end of day, fatigue, evening time. Walking without walker difficulty going down stairs one at a time and with railing, stepping over high curb, weakness getting in and out of car on her own, going up stairs is fine Current Symptoms are relieved by rolling walking, using railings, shoe orthotics.   Recent surgical procedure: none relevant to her stiff/weak ankles   General health as reported by patient is:  good.  Pertinent medical history: OA, overweight, implanted shunt in brain for hydrocephalus, numbness in LE's.  She denies any significant current illness or recent hospital admissions. She denies any other regonal implanted devices.  Current occupational status: retired. Previous functional status:  fully functional prior to pain onset/injury.   Her goal is to walk without a walker.        HPI                    Objective:      Gait:  Flat foot, toe out mild wide ROSA ELENA    Gait Type:  Antalgic   Assistive Devices:  Walker  Deviations:  General Deviations:   Marcela decr, stance time decr and stride length decr          Ankle/Foot Evaluation  ROM:    AROM:    Dorsiflexion:  Left:   -15  Right:   -10  Plantarflexion:  Left:  NL    Right:  NL          PROM:    Dorsiflexion:  Left:    0     Right:   0                 Strength:    Dorsiflexion:  Left: 3-/5     Pain:   Right: 3-/5   Pain:  Plantarflexion: Left: 4+/5   Pain:   Right: 4+/5  Pain:                      LIGAMENT TESTING: not assessed              SPECIAL TESTS: Special tests ankle: mild sign of clonus L>R with foot DF overpressure.      EDEMA: normal          MOBILITY TESTING:       Talocrural Left: hypomobile    Talocrural Right: hypomobile                                                          General     ROS    Assessment/Plan:      Patient is a 66 year old female with both sides ankle complaints.    Patient has the following significant findings with corresponding treatment plan.                Diagnosis 1:  Weakness, balance deficit  Decreased ROM/flexibility - manual therapy and therapeutic exercise  Decreased joint mobility - manual therapy and therapeutic exercise  Decreased strength - therapeutic exercise and therapeutic activities  Impaired balance - neuro re-education and therapeutic activities  Decreased proprioception - neuro re-education and therapeutic activities  Impaired gait - gait training  Impaired muscle performance - neuro re-education  Decreased function - therapeutic activities  Instability -  Therapeutic Activity  Therapeutic Exercise    Therapy Evaluation Codes:   1) History comprised of:   Personal factors that impact the plan of care:      Time since onset of symptoms.    Comorbidity factors that impact the plan of care are:      Implanted device and hydrocephalus with shunt placed.     Medications impacting care: None.  2) Examination of Body Systems comprised of:   Body structures and functions that impact the plan of care:      Ankle.   Activity limitations that impact the plan of  care are:      Dressing, Lifting, Squatting/kneeling, Stairs, Standing and Walking.  3) Clinical presentation characteristics are:   Stable/Uncomplicated.  4) Decision-Making    Low complexity using standardized patient assessment instrument and/or measureable assessment of functional outcome.  Cumulative Therapy Evaluation is: Low complexity.    Previous and current functional limitations:  (See Goal Flow Sheet for this information)    Short term and Long term goals: (See Goal Flow Sheet for this information)     Communication ability:  Patient appears to be able to clearly communicate and understand verbal and written communication and follow directions correctly.  Treatment Explanation - The following has been discussed with the patient:   RX ordered/plan of care  Anticipated outcomes  Possible risks and side effects  This patient would benefit from PT intervention to resume normal activities.   Rehab potential is good.    Frequency:  1 X week, once daily  Duration:  for 8 weeks  Discharge Plan:  Achieve all LTG.  Independent in home treatment program.  Reach maximal therapeutic benefit.    Please refer to the daily flowsheet for treatment today, total treatment time and time spent performing 1:1 timed codes.

## 2017-08-14 ENCOUNTER — THERAPY VISIT (OUTPATIENT)
Dept: PHYSICAL THERAPY | Facility: CLINIC | Age: 67
End: 2017-08-14
Payer: COMMERCIAL

## 2017-08-14 DIAGNOSIS — R26.89 BALANCE PROBLEMS: ICD-10-CM

## 2017-08-14 DIAGNOSIS — R26.9 ABNORMAL GAIT: ICD-10-CM

## 2017-08-14 PROCEDURE — 97110 THERAPEUTIC EXERCISES: CPT | Mod: GP | Performed by: PHYSICAL THERAPIST

## 2017-08-14 PROCEDURE — 97112 NEUROMUSCULAR REEDUCATION: CPT | Mod: GP | Performed by: PHYSICAL THERAPIST

## 2017-08-21 ENCOUNTER — THERAPY VISIT (OUTPATIENT)
Dept: PHYSICAL THERAPY | Facility: CLINIC | Age: 67
End: 2017-08-21
Payer: COMMERCIAL

## 2017-08-21 DIAGNOSIS — R26.9 ABNORMAL GAIT: ICD-10-CM

## 2017-08-21 DIAGNOSIS — R26.89 BALANCE PROBLEMS: ICD-10-CM

## 2017-08-21 PROCEDURE — 97110 THERAPEUTIC EXERCISES: CPT | Mod: GP | Performed by: PHYSICAL THERAPIST

## 2017-08-21 PROCEDURE — 97530 THERAPEUTIC ACTIVITIES: CPT | Mod: GP | Performed by: PHYSICAL THERAPIST

## 2017-08-21 PROCEDURE — 97112 NEUROMUSCULAR REEDUCATION: CPT | Mod: GP | Performed by: PHYSICAL THERAPIST

## 2017-09-05 ENCOUNTER — THERAPY VISIT (OUTPATIENT)
Dept: PHYSICAL THERAPY | Facility: CLINIC | Age: 67
End: 2017-09-05
Payer: COMMERCIAL

## 2017-09-05 DIAGNOSIS — R26.89 BALANCE PROBLEMS: ICD-10-CM

## 2017-09-05 DIAGNOSIS — R26.9 ABNORMAL GAIT: ICD-10-CM

## 2017-09-05 PROCEDURE — 97110 THERAPEUTIC EXERCISES: CPT | Mod: GP | Performed by: PHYSICAL THERAPIST

## 2017-09-05 PROCEDURE — 97530 THERAPEUTIC ACTIVITIES: CPT | Mod: GP | Performed by: PHYSICAL THERAPIST

## 2017-09-18 ENCOUNTER — THERAPY VISIT (OUTPATIENT)
Dept: PHYSICAL THERAPY | Facility: CLINIC | Age: 67
End: 2017-09-18
Payer: COMMERCIAL

## 2017-09-18 DIAGNOSIS — R26.89 BALANCE PROBLEMS: ICD-10-CM

## 2017-09-18 DIAGNOSIS — R26.9 ABNORMAL GAIT: ICD-10-CM

## 2017-09-18 PROCEDURE — 97112 NEUROMUSCULAR REEDUCATION: CPT | Mod: GP | Performed by: PHYSICAL THERAPIST

## 2017-09-18 PROCEDURE — 97530 THERAPEUTIC ACTIVITIES: CPT | Mod: GP | Performed by: PHYSICAL THERAPIST

## 2017-09-25 ENCOUNTER — THERAPY VISIT (OUTPATIENT)
Dept: PHYSICAL THERAPY | Facility: CLINIC | Age: 67
End: 2017-09-25
Payer: COMMERCIAL

## 2017-09-25 DIAGNOSIS — R26.9 ABNORMAL GAIT: ICD-10-CM

## 2017-09-25 DIAGNOSIS — R26.89 BALANCE PROBLEMS: ICD-10-CM

## 2017-09-25 PROCEDURE — 97112 NEUROMUSCULAR REEDUCATION: CPT | Mod: GP | Performed by: PHYSICAL THERAPIST

## 2017-09-25 PROCEDURE — 97110 THERAPEUTIC EXERCISES: CPT | Mod: GP | Performed by: PHYSICAL THERAPIST

## 2017-10-11 ENCOUNTER — THERAPY VISIT (OUTPATIENT)
Dept: PHYSICAL THERAPY | Facility: CLINIC | Age: 67
End: 2017-10-11
Payer: COMMERCIAL

## 2017-10-11 DIAGNOSIS — R26.9 ABNORMAL GAIT: ICD-10-CM

## 2017-10-11 DIAGNOSIS — R26.89 BALANCE PROBLEMS: ICD-10-CM

## 2017-10-11 PROCEDURE — 97112 NEUROMUSCULAR REEDUCATION: CPT | Mod: GP | Performed by: PHYSICAL THERAPIST

## 2017-10-11 PROCEDURE — 97110 THERAPEUTIC EXERCISES: CPT | Mod: GP | Performed by: PHYSICAL THERAPIST

## 2017-10-11 PROCEDURE — G8979 MOBILITY GOAL STATUS: HCPCS | Mod: GP | Performed by: PHYSICAL THERAPIST

## 2017-10-11 PROCEDURE — G8978 MOBILITY CURRENT STATUS: HCPCS | Mod: GP | Performed by: PHYSICAL THERAPIST

## 2017-10-11 NOTE — MR AVS SNAPSHOT
After Visit Summary   10/11/2017    Jessica Qiu    MRN: [id number]           Patient Information     Date Of Birth          1950        Visit Information        Provider Department      10/11/2017 12:40 PM Shannan Oro, PT [facility]        Today's Diagnoses     Abnormal gait        Balance problems           Follow-ups after your visit        Who to contact     If you have questions or need follow up information about today's clinic visit or your schedule please contact [facility] directly at [phone].  Normal or non-critical lab and imaging results will be communicated to you by [employer], letter or phone within 4 business days after the clinic has received the results. If you do not hear from us within 7 days, please contact the clinic through [employer] or phone. If you have a critical or abnormal lab result, we will notify you by phone as soon as possible.  Submit refill requests through [employer] or call your pharmacy and they will forward the refill request to us. Please allow 3 business days for your refill to be completed.          Additional Information About Your Visit        MyChart Information     [employer] gives you secure access to your electronic health record. If you see a primary care provider, you can also send messages to your care team and make appointments. If you have questions, please call your primary care clinic.  If you do not have a primary care provider, please call [phone] and they will assist you.        Care EveryWhere ID     This is your Care EveryWhere ID. This could be used by other organizations to access your [place] medical records  [id number]         Blood Pressure from Last 3 Encounters:   07/20/17 134/75   05/04/17 136/79   03/23/17 124/60    Weight from Last 3 Encounters:   07/20/17 87.5 kg (193 lb)   05/04/17 86.6 kg (191 lb)   03/23/17 87.1 kg (192 lb)              We Performed the Following     NEUROMUSCULAR RE-EDUCATION      THERAPEUTIC EXERCISES        Primary Care Provider Office Phone # Fax #    Brodie Roldan Majano PA-C [phone] [address] [facility] [address]        Equal Access to Services     NEEMA DAVALOS : Hadvicenat carolyn ku jasminao Somarcinali, waaxda luqadaha, qaybta kaalmada adeegyada, baylee starks laNicjatinder hartman. So [facility] [phone].    ATENCIÓN: Si habla español, tiene a adame disposición servicios gratuitos de asistencia lingüística. Llame al [phone].    We comply with applicable federal civil rights laws and [place] laws. We do not discriminate on the basis of race, color, national origin, age, disability, sex, sexual orientation, or gender identity.            Thank you!     Thank you for choosing [facility]  for your care. Our goal is always to provide you with excellent care. Hearing back from our patients is one way we can continue to improve our services. Please take a few minutes to complete the written survey that you may receive in the mail after your visit with us. Thank you!             Your Updated Medication List - Protect others around you: Learn how to safely use, store and throw away your medicines at www.disposemymeds.org.          This list is accurate as of: 10/11/17  2:08 PM.  Always use your most recent med list.                   Brand Name Dispense Instructions for use Diagnosis    allopurinol 300 MG tablet    ZYLOPRIM    90 tablet    Take 1 tablet (300 mg) by mouth daily    Nephrolithiasis       azelastine 0.05 % Soln ophthalmic solution    OPTIVAR    1 Bottle    Apply 1 drop to eye 2 times daily    Allergic conjunctivitis, bilateral       citalopram 20 MG tablet    celeXA    90 tablet    Take 1 tablet (20 mg) by mouth daily    Depression with anxiety       * fluticasone 50 MCG/ACT spray    FLONASE    16 g    Spray 1-2 sprays into both nostrils daily    Subacute cough       * fluticasone 50 MCG/ACT spray    FLONASE    16 g    Spray 1-2 sprays  into both nostrils daily    Seasonal allergic rhinitis due to pollen, unspecified chronicity, Allergic conjunctivitis, bilateral       multivitamin, therapeutic with minerals Tabs tablet      Take 1 tablet by mouth daily        OMEGA-3 FISH OIL PO      Take 500 mg by mouth daily        * Notice:  This list has 2 medication(s) that are the same as other medications prescribed for you. Read the directions carefully, and ask your doctor or other care provider to review them with you.

## 2017-10-11 NOTE — PROGRESS NOTES
Subjective:    HPI                    Objective:    System    Physical Exam    General     ROS    Assessment/Plan:      DISCHARGE REPORT    Progress reporting period is from 8-7-17 to 10-11-17.       SUBJECTIVE  Subjective changes noted by patient:  Subjective: Reports she is able now to walk without AD around her building--still takes cane with her when she goes out.     Current pain level is 0/10  .     Previous pain level was  NA  .   Changes in function:  Yes (See Goal flowsheet attached for changes in current functional level)  Adverse reaction to treatment or activity: None    OBJECTIVE  Changes noted in objective findings:  The objective findings below are from DOS 10-11-17.  Objective: ambulates without AD with some decreased trunk rotation/extension. Lx ROM: Ext=min loss, repeated movement testing demo DP for R SG w/pt. noting improved strength and posture after.      ASSESSMENT/PLAN  Updated problem list and treatment plan: Diagnosis 1:  Achilles pain/balance problems  Pain -  self management, education, directional preference exercise and home program  Decreased ROM/flexibility - manual therapy and therapeutic exercise  Decreased strength - therapeutic exercise and therapeutic activities  Impaired balance - neuro re-education and therapeutic activities  Impaired gait - gait training  Decreased function - therapeutic activities  STG/LTGs have been met or progress has been made towards goals:  Yes (See Goal flow sheet completed today.)  Assessment of Progress: The patient has met all of their long term goals.  Self Management Plans:  Patient is independent in a home treatment program.  Patient is independent in self management of symptoms.  I have re-evaluated this patient and find that the nature, scope, duration and intensity of the therapy is appropriate for the medical condition of the patient.  Jessica continues to require the following intervention to meet STG and LTG's:  PT intervention is no  longer required to meet STG/LTG.    Recommendations:  This patient is ready to be discharged from therapy and continue their home treatment program.    Please refer to the daily flowsheet for treatment today, total treatment time and time spent performing 1:1 timed codes.

## 2018-03-27 DIAGNOSIS — F41.8 DEPRESSION WITH ANXIETY: ICD-10-CM

## 2018-03-27 NOTE — TELEPHONE ENCOUNTER
"Requested Prescriptions   Pending Prescriptions Disp Refills     citalopram (CELEXA) 20 MG tablet [Pharmacy Med Name: Citalopram Hydrobromide Oral Tablet 20 MG]  Last Written Prescription Date:  3/23/17  Last Fill Quantity: 90 TABLET,  # refills: 3   Last office visit: 3/23/17 with prescribing provider:  GILDARDO   Future Office Visit:   Next 5 appointments (look out 90 days)     Apr 20, 2018  3:45 PM CDT   Return Visit with Demarcus Dumont MD   [facility] ([facility])    [address]  [facility] [address] [phone]                  90 tablet 2     Sig: TAKE 1 TABLET BY MOUTH ONE TIME DAILY    SSRIs Protocol Passed    3/27/2018  7:01 AM  PHQ-9 SCORE 6/22/2015 3/6/2017 7/20/2017   Total Score 0 - -   Total Score - 13 2     RAMÍREZ-7 SCORE 12/4/2014 3/6/2017   Total Score 7 -   Total Score - 8              Passed - Recent (12 mo) or future (30 days) visit within the authorizing provider's specialty    Patient had office visit in the last 12 months or has a visit in the next 30 days with authorizing provider or within the authorizing provider's specialty.  See \"Patient Info\" tab in inbasket, or \"Choose Columns\" in Meds & Orders section of the refill encounter.           Passed - Patient is age 18 or older       Passed - No active pregnancy on record       Passed - No positive pregnancy test in last 12 months          "

## 2018-03-29 RX ORDER — CITALOPRAM HYDROBROMIDE 20 MG/1
TABLET ORAL
Qty: 30 TABLET | Refills: 0 | Status: SHIPPED | OUTPATIENT
Start: 2018-03-29 | End: 2018-04-29

## 2018-03-29 NOTE — TELEPHONE ENCOUNTER
Medication is being filled for 1 time refill only due to:  Patient needs to be seen because it has been more than one year since last visit.+

## 2018-04-20 ENCOUNTER — OFFICE VISIT (OUTPATIENT)
Dept: CARDIOLOGY | Facility: CLINIC | Age: 68
End: 2018-04-20
Attending: INTERNAL MEDICINE
Payer: COMMERCIAL

## 2018-04-20 ENCOUNTER — HOSPITAL ENCOUNTER (OUTPATIENT)
Dept: CARDIOLOGY | Facility: CLINIC | Age: 68
Discharge: HOME | End: 2018-04-20
Attending: INTERNAL MEDICINE | Admitting: INTERNAL MEDICINE
Payer: COMMERCIAL

## 2018-04-20 VITALS
HEART RATE: 60 BPM | SYSTOLIC BLOOD PRESSURE: 140 MMHG | DIASTOLIC BLOOD PRESSURE: 70 MMHG | HEIGHT: 64 IN | WEIGHT: 197 LBS | OXYGEN SATURATION: 97 % | BODY MASS INDEX: 33.63 KG/M2

## 2018-04-20 DIAGNOSIS — I35.1 NONRHEUMATIC AORTIC VALVE INSUFFICIENCY: ICD-10-CM

## 2018-04-20 DIAGNOSIS — R60.9 EDEMA, UNSPECIFIED TYPE: ICD-10-CM

## 2018-04-20 DIAGNOSIS — I71.20 THORACIC AORTIC ANEURYSM (H): ICD-10-CM

## 2018-04-20 DIAGNOSIS — I71.20 THORACIC AORTIC ANEURYSM WITHOUT RUPTURE (H): ICD-10-CM

## 2018-04-20 PROCEDURE — 93306 TTE W/DOPPLER COMPLETE: CPT | Mod: 26 | Performed by: INTERNAL MEDICINE

## 2018-04-20 PROCEDURE — 99213 OFFICE O/P EST LOW 20 MIN: CPT | Mod: 25 | Performed by: INTERNAL MEDICINE

## 2018-04-20 PROCEDURE — 25500064 ZZH RX 255 OP 636: Performed by: INTERNAL MEDICINE

## 2018-04-20 PROCEDURE — 93306 TTE W/DOPPLER COMPLETE: CPT

## 2018-04-20 RX ADMIN — HUMAN ALBUMIN MICROSPHERES AND PERFLUTREN 3 ML: 10; .22 INJECTION, SOLUTION INTRAVENOUS at 13:31

## 2018-04-20 NOTE — LETTER
4/20/2018    Brodie Majano PA-C  [address]  [facility] [address]    RE: Jessica Qiu       Dear Colleague,    I had the pleasure of seeing Jessica Qiu in the [facility].    CARDIOLOGY VISIT    REASON FOR VISIT: f/u AI, dilated aorta    SUBJECTIVE:  66-year-old female seen for f/u of aortic insufficiency and dilated ascending aorta.      Echocardiogram in May 2008 at [facility] showed preserved ejection fraction, trace aortic insufficiency, a positive bubble study, and an ascending thoracic aneurysm at 4.75 cm.  Subsequent thoracic MRA showed ascending aortic aneurysm of 4.6 x 4.3 cm, arch 2.7 cm, and descending aorta 2.5 cm.     Historically her blood pressure runs on the low side.  She has never been on treatment for hypertension.     At baseline patient does some light walking at a slow pace.  She has a history of hydrocephalus and has some weakness in her lower extremities.  She denies any chest pain, but does have mild dyspnea with exertion.     In the past few weeks she has noticed bilateral lower extremity edema of the ankles.  She denies any pain in her legs.  She admits to a 10 pound weight gain.     Echo 2/2017 showed normal left ventricular size, ejection fraction 60%, mild LVH, normal right ventricle, trileaflet aortic valve with moderate aortic insufficiency, ascending aorta is moderately dilated at 4.6 cm.      Echo April 2018 showed EF 60%, normal RV, mild to moderate aortic insufficiency, ascending aorta 4.6 cm.    She's been doing well in the past one year.  She denies any lightheadedness, dizziness, shortness of breath, or chest pain.  She's not checked her blood pressure outside clinic.    MEDICATIONS:  Current Outpatient Prescriptions   Medication     allopurinol (ZYLOPRIM) 300 MG tablet     azelastine (OPTIVAR) 0.05 % SOLN ophthalmic solution     citalopram (CELEXA) 20 MG tablet     fluticasone (FLONASE) 50 MCG/ACT spray      "multivitamin, therapeutic with minerals (THERA-VIT-M) TABS     Omega-3 Fatty Acids (OMEGA-3 FISH OIL PO)     fluticasone (FLONASE) 50 MCG/ACT spray     No current facility-administered medications for this visit.        ALLERGIES:  Allergies   Allergen Reactions     Simvastatin Muscle Pain (Myalgia)     extreme muscle and joint pains     Hmg-Coa-R Inhibitors      Muscle pain and stiffness     Pollen Extract      Pt. Has hayfever       REVIEW OF SYSTEMS:  Constitutional:  No weight loss, fever, chills, weakness or fatigue.  HEENT:  Eyes:  No visual loss, blurred vision, double vision or yellow sclerae. No hearing loss, sneezing, congestion, runny nose or sore throat.  Skin:  No rash or itching.  Cardiovascular: per HPI  Respiratory: per HPI  GI:  No anorexia, nausea, vomiting or diarrhea. No abdominal pain or blood.  :  No dysurea, hematuria  Neurologic:  No headache, dizziness, syncope, paralysis, ataxia, numbness or tingling in the extremities. No change in bowel or bladder control.  Musculoskeletal:  No muscle, back pain, joint pain or stiffness.  Hematologic:  No anemia, bleeding or bruising.  Lymphatics:  No enlarged nodes. No history of splenectomy.  Psychiatric:  No history of depression or anxiety.  Endocrine:  No reports of sweating, cold or heat intolerance. No polyuria or polydipsia.  Allergies:  No history of asthma, hives, eczema or rhinitis.    PHYSICAL EXAM:  /70 (BP Location: Right arm, Patient Position: Sitting, Cuff Size: Adult Regular)  Pulse 60  Ht 1.613 m (5' 3.5\")  Wt 89.4 kg (197 lb)  SpO2 97%  BMI 34.35 kg/m2  Constitutional: awake, alert, no distress  Eyes: PERRL, sclera nonicteric  ENT: trachea midline  Respiratory: Lungs clear  Cardiovascular: Regular rate and rhythm, 2/6 diastolic murmur at the upper sternal border  GI: nondistended, nontender, bowel sounds present  Lymph/Hematologic: no lymphadenopathy  Skin: dry, no rash  Musculoskeletal: good muscle tone, strength 5/5 in " upper and lower extremities  Neurologic: no focal deficits  Neuropsychiatric: appropriate affact    ASSESSMENT:  67-year-old female seen for follow-up of aortic insufficiency and ascending aortic aneurysm.  Clinically she has no concerning symptoms.  Her echo shows no change in the past one year.  Even compared to 2008, the aorta has been stable at 4.6 cm.  She was encouraged to check her blood pressure periodically, but historically she has been normotensive.    RECOMMENDATIONS:  1.  Aortic insufficiency with ascending aortic aneurysm  - Repeat echo in 2 years, spring 2020    Follow-up in 2 years.    Demarcus Dumont MD  Cardiology - [facility]  Pager:  [phone]  Text Page  April 20, 2018      Thank you for allowing me to participate in the care of your patient.      Sincerely,     Demarcus Dumont MD     [facility]    cc:   Demarcus Dumont MD  [facility]  [address] [place] [address]

## 2018-04-20 NOTE — PROGRESS NOTES
CARDIOLOGY VISIT    REASON FOR VISIT: f/u AI, dilated aorta    SUBJECTIVE:  66-year-old female seen for f/u of aortic insufficiency and dilated ascending aorta.      Echocardiogram in May 2008 at [facility] showed preserved ejection fraction, trace aortic insufficiency, a positive bubble study, and an ascending thoracic aneurysm at 4.75 cm.  Subsequent thoracic MRA showed ascending aortic aneurysm of 4.6 x 4.3 cm, arch 2.7 cm, and descending aorta 2.5 cm.     Historically her blood pressure runs on the low side.  She has never been on treatment for hypertension.     At baseline patient does some light walking at a slow pace.  She has a history of hydrocephalus and has some weakness in her lower extremities.  She denies any chest pain, but does have mild dyspnea with exertion.     In the past few weeks she has noticed bilateral lower extremity edema of the ankles.  She denies any pain in her legs.  She admits to a 10 pound weight gain.     Echo 2/2017 showed normal left ventricular size, ejection fraction 60%, mild LVH, normal right ventricle, trileaflet aortic valve with moderate aortic insufficiency, ascending aorta is moderately dilated at 4.6 cm.      Echo April 2018 showed EF 60%, normal RV, mild to moderate aortic insufficiency, ascending aorta 4.6 cm.    She's been doing well in the past one year.  She denies any lightheadedness, dizziness, shortness of breath, or chest pain.  She's not checked her blood pressure outside clinic.    MEDICATIONS:  Current Outpatient Prescriptions   Medication     allopurinol (ZYLOPRIM) 300 MG tablet     azelastine (OPTIVAR) 0.05 % SOLN ophthalmic solution     citalopram (CELEXA) 20 MG tablet     fluticasone (FLONASE) 50 MCG/ACT spray     multivitamin, therapeutic with minerals (THERA-VIT-M) TABS     Omega-3 Fatty Acids (OMEGA-3 FISH OIL PO)     fluticasone (FLONASE) 50 MCG/ACT spray     No current facility-administered medications for this visit.        ALLERGIES:  Allergies  "  Allergen Reactions     Simvastatin Muscle Pain (Myalgia)     extreme muscle and joint pains     Hmg-Coa-R Inhibitors      Muscle pain and stiffness     Pollen Extract      Pt. Has hayfever       REVIEW OF SYSTEMS:  Constitutional:  No weight loss, fever, chills, weakness or fatigue.  HEENT:  Eyes:  No visual loss, blurred vision, double vision or yellow sclerae. No hearing loss, sneezing, congestion, runny nose or sore throat.  Skin:  No rash or itching.  Cardiovascular: per HPI  Respiratory: per HPI  GI:  No anorexia, nausea, vomiting or diarrhea. No abdominal pain or blood.  :  No dysurea, hematuria  Neurologic:  No headache, dizziness, syncope, paralysis, ataxia, numbness or tingling in the extremities. No change in bowel or bladder control.  Musculoskeletal:  No muscle, back pain, joint pain or stiffness.  Hematologic:  No anemia, bleeding or bruising.  Lymphatics:  No enlarged nodes. No history of splenectomy.  Psychiatric:  No history of depression or anxiety.  Endocrine:  No reports of sweating, cold or heat intolerance. No polyuria or polydipsia.  Allergies:  No history of asthma, hives, eczema or rhinitis.    PHYSICAL EXAM:  /70 (BP Location: Right arm, Patient Position: Sitting, Cuff Size: Adult Regular)  Pulse 60  Ht 1.613 m (5' 3.5\")  Wt 89.4 kg (197 lb)  SpO2 97%  BMI 34.35 kg/m2  Constitutional: awake, alert, no distress  Eyes: PERRL, sclera nonicteric  ENT: trachea midline  Respiratory: Lungs clear  Cardiovascular: Regular rate and rhythm, 2/6 diastolic murmur at the upper sternal border  GI: nondistended, nontender, bowel sounds present  Lymph/Hematologic: no lymphadenopathy  Skin: dry, no rash  Musculoskeletal: good muscle tone, strength 5/5 in upper and lower extremities  Neurologic: no focal deficits  Neuropsychiatric: appropriate affact    ASSESSMENT:  67-year-old female seen for follow-up of aortic insufficiency and ascending aortic aneurysm.  Clinically she has no concerning " symptoms.  Her echo shows no change in the past one year.  Even compared to 2008, the aorta has been stable at 4.6 cm.  She was encouraged to check her blood pressure periodically, but historically she has been normotensive.    RECOMMENDATIONS:  1.  Aortic insufficiency with ascending aortic aneurysm  - Repeat echo in 2 years, spring 2020    Follow-up in 2 years.    Demarcus Dumont MD  Cardiology - [facility]  Pager:  [phone]  Text Page  April 20, 2018

## 2018-04-20 NOTE — LETTER
4/20/2018    Brodie Majano PA-C  [address]  [facility] [address]    RE: Jessica Qiu       Dear Colleague,    I had the pleasure of seeing Jessica Qiu in the [facility].    CARDIOLOGY VISIT    REASON FOR VISIT: f/u AI, dilated aorta    SUBJECTIVE:  66-year-old female seen for f/u of aortic insufficiency and dilated ascending aorta.      Echocardiogram in May 2008 at [facility] showed preserved ejection fraction, trace aortic insufficiency, a positive bubble study, and an ascending thoracic aneurysm at 4.75 cm.  Subsequent thoracic MRA showed ascending aortic aneurysm of 4.6 x 4.3 cm, arch 2.7 cm, and descending aorta 2.5 cm.     Historically her blood pressure runs on the low side.  She has never been on treatment for hypertension.     At baseline patient does some light walking at a slow pace.  She has a history of hydrocephalus and has some weakness in her lower extremities.  She denies any chest pain, but does have mild dyspnea with exertion.     In the past few weeks she has noticed bilateral lower extremity edema of the ankles.  She denies any pain in her legs.  She admits to a 10 pound weight gain.     Echo 2/2017 showed normal left ventricular size, ejection fraction 60%, mild LVH, normal right ventricle, trileaflet aortic valve with moderate aortic insufficiency, ascending aorta is moderately dilated at 4.6 cm.      Echo April 2018 showed EF 60%, normal RV, mild to moderate aortic insufficiency, ascending aorta 4.6 cm.    She's been doing well in the past one year.  She denies any lightheadedness, dizziness, shortness of breath, or chest pain.  She's not checked her blood pressure outside clinic.    MEDICATIONS:  Current Outpatient Prescriptions   Medication     allopurinol (ZYLOPRIM) 300 MG tablet     azelastine (OPTIVAR) 0.05 % SOLN ophthalmic solution     citalopram (CELEXA) 20 MG tablet     fluticasone (FLONASE) 50 MCG/ACT spray      "multivitamin, therapeutic with minerals (THERA-VIT-M) TABS     Omega-3 Fatty Acids (OMEGA-3 FISH OIL PO)     fluticasone (FLONASE) 50 MCG/ACT spray     No current facility-administered medications for this visit.        ALLERGIES:  Allergies   Allergen Reactions     Simvastatin Muscle Pain (Myalgia)     extreme muscle and joint pains     Hmg-Coa-R Inhibitors      Muscle pain and stiffness     Pollen Extract      Pt. Has hayfever       REVIEW OF SYSTEMS:  Constitutional:  No weight loss, fever, chills, weakness or fatigue.  HEENT:  Eyes:  No visual loss, blurred vision, double vision or yellow sclerae. No hearing loss, sneezing, congestion, runny nose or sore throat.  Skin:  No rash or itching.  Cardiovascular: per HPI  Respiratory: per HPI  GI:  No anorexia, nausea, vomiting or diarrhea. No abdominal pain or blood.  :  No dysurea, hematuria  Neurologic:  No headache, dizziness, syncope, paralysis, ataxia, numbness or tingling in the extremities. No change in bowel or bladder control.  Musculoskeletal:  No muscle, back pain, joint pain or stiffness.  Hematologic:  No anemia, bleeding or bruising.  Lymphatics:  No enlarged nodes. No history of splenectomy.  Psychiatric:  No history of depression or anxiety.  Endocrine:  No reports of sweating, cold or heat intolerance. No polyuria or polydipsia.  Allergies:  No history of asthma, hives, eczema or rhinitis.    PHYSICAL EXAM:  /70 (BP Location: Right arm, Patient Position: Sitting, Cuff Size: Adult Regular)  Pulse 60  Ht 1.613 m (5' 3.5\")  Wt 89.4 kg (197 lb)  SpO2 97%  BMI 34.35 kg/m2  Constitutional: awake, alert, no distress  Eyes: PERRL, sclera nonicteric  ENT: trachea midline  Respiratory: Lungs clear  Cardiovascular: Regular rate and rhythm, 2/6 diastolic murmur at the upper sternal border  GI: nondistended, nontender, bowel sounds present  Lymph/Hematologic: no lymphadenopathy  Skin: dry, no rash  Musculoskeletal: good muscle tone, strength 5/5 in " upper and lower extremities  Neurologic: no focal deficits  Neuropsychiatric: appropriate affact    ASSESSMENT:  67-year-old female seen for follow-up of aortic insufficiency and ascending aortic aneurysm.  Clinically she has no concerning symptoms.  Her echo shows no change in the past one year.  Even compared to 2008, the aorta has been stable at 4.6 cm.  She was encouraged to check her blood pressure periodically, but historically she has been normotensive.    RECOMMENDATIONS:  1.  Aortic insufficiency with ascending aortic aneurysm  - Repeat echo in 2 years, spring 2020    Follow-up in 2 years.    Demarcus Dumont MD  Cardiology - [facility] [[email or web address]] [facility]  Pager:  [phone]  Text Page  April 20, 2018      Thank you for allowing me to participate in the care of your patient.    Sincerely,     Demarcus Dumont MD     [facility]

## 2018-04-20 NOTE — MR AVS SNAPSHOT
After Visit Summary   4/20/2018    Jessica Qiu    MRN: [id number]           Patient Information     Date Of Birth          1950        Visit Information        Provider Department      4/20/2018 3:45 PM Demarcus Dumont MD [facility]        Today's Diagnoses     Nonrheumatic aortic valve insufficiency        Thoracic aortic aneurysm without rupture (H)        Edema, unspecified type           Follow-ups after your visit        Additional Services     Follow-Up with Cardiologist                 Future tests that were ordered for you today     Open Future Orders        Priority Expected Expires Ordered    Follow-Up with Cardiologist Routine 4/20/2020 5/21/2020 4/20/2018    Echocardiogram Routine 3/18/2020 4/30/2020 4/20/2018    ECHO COMPLETE WITH OPTISON Routine 2/21/2018 3/2/2019 3/2/2017            Who to contact     If you have questions or need follow up information about today's clinic visit or your schedule please contact [facility] directly at [phone].  Normal or non-critical lab and imaging results will be communicated to you by [employer], letter or phone within 4 business days after the clinic has received the results. If you do not hear from us within 7 days, please contact the clinic through [employer] or phone. If you have a critical or abnormal lab result, we will notify you by phone as soon as possible.  Submit refill requests through [employer] or call your pharmacy and they will forward the refill request to us. Please allow 3 business days for your refill to be completed.          Additional Information About Your Visit        [employer] Information     [employer] gives you secure access to your electronic health record. If you see a primary care provider, you can also send messages to your care team and make appointments. If you have questions, please call your primary care clinic.  If  "you do not have a primary care provider, please call [phone] and they will assist you.        Care EveryWhere ID     This is your Care EveryWhere ID. This could be used by other organizations to access your [facility] medical records  [id number]        Your Vitals Were     Pulse Height Pulse Oximetry BMI (Body Mass Index)          60 1.613 m (5' 3.5\") 97% 34.35 kg/m2         Blood Pressure from Last 3 Encounters:   04/20/18 140/70   07/20/17 134/75   05/04/17 136/79    Weight from Last 3 Encounters:   04/20/18 89.4 kg (197 lb)   07/20/17 87.5 kg (193 lb)   05/04/17 86.6 kg (191 lb)              We Performed the Following     Follow-Up with Cardiologist        Primary Care Provider Office Phone # Fax #    Brodie Roldan Majano PA-C [phone] [address] [facility] [address]        Equal Access to Services     EFRAIN DAVALOS : Hadii aad ku hadasho Soomaali, waaxda luqadaha, qaybta kaalmada adeegyada, waxay lisa hayrauln campbell villarreal . So [facility] [phone].    ATENCIÓN: Si habla español, tiene a adame disposición servicios gratuitos de asistencia lingüística. [facility] [phone].    We comply with applicable federal civil rights laws and [place] laws. We do not discriminate on the basis of race, color, national origin, age, disability, sex, sexual orientation, or gender identity.            Thank you!     Thank you for choosing [facility]  for your care. Our goal is always to provide you with excellent care. Hearing back from our patients is one way we can continue to improve our services. Please take a few minutes to complete the written survey that you may receive in the mail after your visit with us. Thank you!             Your Updated Medication List - Protect others around you: Learn how to safely use, store and throw away your medicines at www.disposemymeds.org.          This list is accurate as of 4/20/18  4:20 PM.  Always use " your most recent med list.                   Brand Name Dispense Instructions for use Diagnosis    allopurinol 300 MG tablet    ZYLOPRIM    90 tablet    Take 1 tablet (300 mg) by mouth daily    Nephrolithiasis       azelastine 0.05 % Soln ophthalmic solution    OPTIVAR    1 Bottle    Apply 1 drop to eye 2 times daily    Allergic conjunctivitis, bilateral       citalopram 20 MG tablet    celeXA    30 tablet    TAKE 1 TABLET BY MOUTH ONE TIME DAILY    Depression with anxiety       * fluticasone 50 MCG/ACT spray    FLONASE    16 g    Spray 1-2 sprays into both nostrils daily    Subacute cough       * fluticasone 50 MCG/ACT spray    FLONASE    16 g    Spray 1-2 sprays into both nostrils daily    Seasonal allergic rhinitis due to pollen, unspecified chronicity, Allergic conjunctivitis, bilateral       multivitamin, therapeutic with minerals Tabs tablet      Take 1 tablet by mouth daily        OMEGA-3 FISH OIL PO      Take 500 mg by mouth daily        * Notice:  This list has 2 medication(s) that are the same as other medications prescribed for you. Read the directions carefully, and ask your doctor or other care provider to review them with you.

## 2018-04-29 DIAGNOSIS — F41.8 DEPRESSION WITH ANXIETY: ICD-10-CM

## 2018-04-30 NOTE — TELEPHONE ENCOUNTER
"Requested Prescriptions   Pending Prescriptions Disp Refills     citalopram (CELEXA) 20 MG tablet [Pharmacy Med Name: Citalopram Hydrobromide Oral Tablet 20 MG]  Last Written Prescription Date:  3/29/18  Last Fill Quantity: 30,  # refills: 0   Last office visit: 7/20/2017 with prescribing provider:  7/20/2017     Future Office Visit:     30 tablet 0     Sig: TAKE 1 TABLET BY MOUTH ONE TIME DAILY    SSRIs Protocol Failed    4/29/2018  7:01 AM  PHQ-9 SCORE 6/22/2015 3/6/2017 7/20/2017   Total Score 0 - -   Total Score - 13 2     RAMÍREZ-7 SCORE 12/4/2014 3/6/2017   Total Score 7 -   Total Score - 8              Failed - Recent (12 mo) or future (30 days) visit within the authorizing provider's specialty    Patient had office visit in the last 12 months or has a visit in the next 30 days with authorizing provider or within the authorizing provider's specialty.  See \"Patient Info\" tab in inbasket, or \"Choose Columns\" in Meds & Orders section of the refill encounter.           Passed - Patient is age 18 or older       Passed - No active pregnancy on record       Passed - No positive pregnancy test in last 12 months          "

## 2018-05-04 RX ORDER — CITALOPRAM HYDROBROMIDE 20 MG/1
TABLET ORAL
Qty: 30 TABLET | Refills: 0 | Status: SHIPPED | OUTPATIENT
Start: 2018-05-04

## 2018-05-04 NOTE — TELEPHONE ENCOUNTER
Routing refill request to provider for review/approval because:  Patient needs to be seen because it has been more than 1 year since last office visit.    Michelle LeonardRN BSN  [facility]  [phone]

## 2018-05-04 NOTE — TELEPHONE ENCOUNTER
Refill pool should be informing pt of need for follow up, actually  scheduling pt for follow up or routing to scheduling pool, NOT to  PCP to schedule.  Not efficient or appropriate to add multiple steps.  Will provide one month until pt can be seen in clinic by PCP .    PHQ-9 SCORE 6/22/2015 3/6/2017 7/20/2017   Total Score 0 - -   Total Score - 13 2     PHQ also not up to date.

## 2018-05-09 NOTE — TELEPHONE ENCOUNTER
Patient sees another provider through [facility] and will follw up with them.    Danielle Matias RN

## 2018-06-18 ENCOUNTER — MYC MEDICAL ADVICE (OUTPATIENT)
Dept: FAMILY MEDICINE | Facility: CLINIC | Age: 68
End: 2018-06-18

## 2018-06-18 NOTE — TELEPHONE ENCOUNTER
Can we call [facility] to inquire on this? There are no notes in care everywhere as far as I can see.     First, I would like to know why they cannot place outside orders through a [facility] facility if they want this through [facility]. This would be ideal to make sure the correct scan is ordered.     If not, I will need to consult with her neurosurgery team so that I am made aware of what image to order.     I would prefer the first option so that they will then get the results from us.     -Jean-Claude Majano, PAC

## 2018-06-18 NOTE — TELEPHONE ENCOUNTER
Pt calls, see Mychart message, wants ZB to order some kind of CT brain scan to access fluid per [facility] recommendations, wants done at a  facility, has not seen [facility] about 1 year ago, discussed at length, will get ZB recommendations on how to proceed, wants ZB to call them to ask them what they need, order this and send copy of results to them, inform pt by phone call or Anai Rich RN, BSN  Message handled by Nurse Triage.

## 2018-06-19 ENCOUNTER — MEDICAL CORRESPONDENCE (OUTPATIENT)
Dept: HEALTH INFORMATION MANAGEMENT | Facility: CLINIC | Age: 68
End: 2018-06-19

## 2018-06-19 NOTE — TELEPHONE ENCOUNTER
We tried calling [facility] at number provided. Outgoing message If you are calling this number as result of seeing on Caller ID we are unable to identify who was trying to reach you. Please call your [facility] care provider for further assistance.   Pt informed we unable to reach neuro nurse.   Pt informed to ask neuro team to order.  If they will not, please ask them to call [facility] for a consultation. Jean-Claude's clinic hours this week reviewed.   Larry Ponce RN

## 2018-06-21 ENCOUNTER — TELEPHONE (OUTPATIENT)
Dept: FAMILY MEDICINE | Facility: CLINIC | Age: 68
End: 2018-06-21

## 2018-06-21 DIAGNOSIS — Z98.2 VP (VENTRICULOPERITONEAL) SHUNT STATUS: ICD-10-CM

## 2018-06-21 DIAGNOSIS — G91.9 HYDROCEPHALUS (H): Primary | ICD-10-CM

## 2018-06-21 NOTE — TELEPHONE ENCOUNTER
Patient called to check the status of this request, she is very upset with [facility] for not able to get this test order. She would like this problem resolved today, please review and advise.    Rosina Godinez

## 2018-06-21 NOTE — TELEPHONE ENCOUNTER
I am a patient of [facility] in  [facility].   I also receive care at [facility] for managing excess brain fluid.    My [facility] neurosurgeon asked me to get a CT scan via my primary doctor, the reasons being financial and travel,  Reviewing the results would help determine if my shunt can be adjusted to decrease the negative health impacts  has on me.           Last Monday, I met with resistance as AV refused a CT scan without an order  from the neurosurgeon .  I inadvertently gave AV an old number for him.  Instead of checking with [facility] directory, [facility] staff refused do anymore on my behalf without that [facility] order in hand.  I contacted [facility] who promptly  complied with [facility]'s request.  Now, the clinic says they didn't receive order, so they're not responsible for assisting me or [facility].           Is [facility] not part of the medical health profession?  Have they no recourse but refuse service issues arise? Can they not respond in a timely manner?  No what?

## 2018-06-21 NOTE — TELEPHONE ENCOUNTER
I think there was a miss understanding. I have no problem placing the order if our imaging department will not accept outside orders. However, I want to make sure the order I place is correct. There are many different types of CT scans and I would hate to place the wrong one and waste money.     What the patient can do is send me the copy of the order herself or have [facility] fax it to me. Or I can talk to her neurologist over the phone who can inform me what is needed.     -Jean-Claude Majano, PAC

## 2018-06-21 NOTE — TELEPHONE ENCOUNTER
Pt returned call and states is very upset of how this encounter was handled, she states that [facility] has faxed over order twice.  Located order in the abstracting pile, placed on ZB keyboard on his desk.  Please respond promptly.  Pt did file a complaint to [facility].    Erika Goodrich/[facility]---[facility]

## 2018-06-22 NOTE — TELEPHONE ENCOUNTER
Spoke with patient.  All information given.  Also gave patient the number to scheduling in case she would like to call them to set up her CT scan instead of waiting for their call.  Sima Benoit M.A.

## 2018-06-29 ENCOUNTER — HOSPITAL ENCOUNTER (OUTPATIENT)
Dept: CT IMAGING | Facility: CLINIC | Age: 68
Discharge: HOME | End: 2018-06-29
Attending: PHYSICIAN ASSISTANT | Admitting: PHYSICIAN ASSISTANT
Payer: COMMERCIAL

## 2018-06-29 DIAGNOSIS — G91.9 HYDROCEPHALUS (H): ICD-10-CM

## 2018-06-29 DIAGNOSIS — Z98.2 VP (VENTRICULOPERITONEAL) SHUNT STATUS: ICD-10-CM

## 2018-06-29 PROCEDURE — 70450 CT HEAD/BRAIN W/O DYE: CPT

## 2019-09-30 ENCOUNTER — HEALTH MAINTENANCE LETTER (OUTPATIENT)
Age: 69
End: 2019-09-30

## 2020-03-15 ENCOUNTER — HEALTH MAINTENANCE LETTER (OUTPATIENT)
Age: 70
End: 2020-03-15

## 2020-05-27 ENCOUNTER — APPOINTMENT (OUTPATIENT)
Dept: CT IMAGING | Facility: CLINIC | Age: 70
End: 2020-05-27
Attending: EMERGENCY MEDICINE
Payer: COMMERCIAL

## 2020-05-27 ENCOUNTER — HOSPITAL ENCOUNTER (EMERGENCY)
Facility: CLINIC | Age: 70
Discharge: ANOTHER HOSPITAL | End: 2020-05-27
Attending: EMERGENCY MEDICINE | Admitting: EMERGENCY MEDICINE
Payer: COMMERCIAL

## 2020-05-27 ENCOUNTER — HOSPITAL ENCOUNTER (INPATIENT)
Facility: CLINIC | Age: 70
LOS: 3 days | Discharge: HOME HEALTH | DRG: 270 | End: 2020-05-30
Attending: INTERNAL MEDICINE | Admitting: INTERNAL MEDICINE
Payer: COMMERCIAL

## 2020-05-27 ENCOUNTER — APPOINTMENT (OUTPATIENT)
Dept: ULTRASOUND IMAGING | Facility: CLINIC | Age: 70
End: 2020-05-27
Attending: EMERGENCY MEDICINE
Payer: COMMERCIAL

## 2020-05-27 VITALS
WEIGHT: 193.12 LBS | HEIGHT: 64 IN | OXYGEN SATURATION: 94 % | TEMPERATURE: 98.1 F | DIASTOLIC BLOOD PRESSURE: 68 MMHG | HEART RATE: 78 BPM | RESPIRATION RATE: 16 BRPM | BODY MASS INDEX: 32.97 KG/M2 | SYSTOLIC BLOOD PRESSURE: 120 MMHG

## 2020-05-27 DIAGNOSIS — R00.0 TACHYCARDIA: ICD-10-CM

## 2020-05-27 DIAGNOSIS — N88.8 ENLARGED CERVIX: ICD-10-CM

## 2020-05-27 DIAGNOSIS — R41.89 COGNITIVE DECLINE: ICD-10-CM

## 2020-05-27 DIAGNOSIS — G91.9 HYDROCEPHALUS, UNSPECIFIED TYPE (H): ICD-10-CM

## 2020-05-27 DIAGNOSIS — I26.99 ACUTE PULMONARY EMBOLISM WITHOUT ACUTE COR PULMONALE, UNSPECIFIED PULMONARY EMBOLISM TYPE (H): ICD-10-CM

## 2020-05-27 DIAGNOSIS — R79.89 ELEVATED LACTIC ACID LEVEL: ICD-10-CM

## 2020-05-27 DIAGNOSIS — I80.202 PHLEGMASIA CERULEA DOLENS OF LEFT LOWER EXTREMITY (H): ICD-10-CM

## 2020-05-27 DIAGNOSIS — O22.30 DVT (DEEP VEIN THROMBOSIS) IN PREGNANCY: ICD-10-CM

## 2020-05-27 DIAGNOSIS — R26.9 GAIT DISORDER: ICD-10-CM

## 2020-05-27 DIAGNOSIS — Z86.718 HISTORY OF DEEP VENOUS THROMBOSIS: Primary | ICD-10-CM

## 2020-05-27 LAB
ALBUMIN SERPL-MCNC: 4 G/DL (ref 3.4–5)
ALBUMIN UR-MCNC: NEGATIVE MG/DL
ALP SERPL-CCNC: 80 U/L (ref 40–150)
ALT SERPL W P-5'-P-CCNC: 27 U/L (ref 0–50)
ANION GAP SERPL CALCULATED.3IONS-SCNC: 8 MMOL/L (ref 3–14)
APPEARANCE UR: CLEAR
APTT PPP: 29 SEC (ref 22–37)
AST SERPL W P-5'-P-CCNC: 21 U/L (ref 0–45)
BACTERIA #/AREA URNS HPF: ABNORMAL /HPF
BASE DEFICIT BLDV-SCNC: 1.6 MMOL/L
BASOPHILS # BLD AUTO: 0.1 10E9/L (ref 0–0.2)
BASOPHILS NFR BLD AUTO: 0.8 %
BILIRUB SERPL-MCNC: 1.2 MG/DL (ref 0.2–1.3)
BILIRUB UR QL STRIP: NEGATIVE
BUN SERPL-MCNC: 12 MG/DL (ref 7–30)
CALCIUM SERPL-MCNC: 9.3 MG/DL (ref 8.5–10.1)
CHLORIDE SERPL-SCNC: 104 MMOL/L (ref 94–109)
CO2 SERPL-SCNC: 23 MMOL/L (ref 20–32)
COLOR UR AUTO: YELLOW
CREAT SERPL-MCNC: 1 MG/DL (ref 0.52–1.04)
DIFFERENTIAL METHOD BLD: ABNORMAL
EOSINOPHIL # BLD AUTO: 0.2 10E9/L (ref 0–0.7)
EOSINOPHIL NFR BLD AUTO: 1.9 %
ERYTHROCYTE [DISTWIDTH] IN BLOOD BY AUTOMATED COUNT: 13.9 % (ref 10–15)
GFR SERPL CREATININE-BSD FRML MDRD: 57 ML/MIN/{1.73_M2}
GLUCOSE SERPL-MCNC: 120 MG/DL (ref 70–99)
GLUCOSE UR STRIP-MCNC: NEGATIVE MG/DL
HCO3 BLDV-SCNC: 24 MMOL/L (ref 21–28)
HCT VFR BLD AUTO: 46.7 % (ref 35–47)
HGB BLD-MCNC: 14.9 G/DL (ref 11.7–15.7)
HGB UR QL STRIP: ABNORMAL
HYALINE CASTS #/AREA URNS LPF: 4 /LPF (ref 0–2)
IMM GRANULOCYTES # BLD: 0.1 10E9/L (ref 0–0.4)
IMM GRANULOCYTES NFR BLD: 0.6 %
INR PPP: 1.11 (ref 0.86–1.14)
INTERPRETATION ECG - MUSE: NORMAL
KETONES UR STRIP-MCNC: NEGATIVE MG/DL
LACTATE BLD-SCNC: 1.5 MMOL/L (ref 0.7–2)
LACTATE BLD-SCNC: 2.6 MMOL/L (ref 0.7–2)
LEUKOCYTE ESTERASE UR QL STRIP: NEGATIVE
LIPASE SERPL-CCNC: 72 U/L (ref 73–393)
LYMPHOCYTES # BLD AUTO: 1.8 10E9/L (ref 0.8–5.3)
LYMPHOCYTES NFR BLD AUTO: 14.2 %
MCH RBC QN AUTO: 30.6 PG (ref 26.5–33)
MCHC RBC AUTO-ENTMCNC: 31.9 G/DL (ref 31.5–36.5)
MCV RBC AUTO: 96 FL (ref 78–100)
MONOCYTES # BLD AUTO: 1.5 10E9/L (ref 0–1.3)
MONOCYTES NFR BLD AUTO: 11.4 %
MUCOUS THREADS #/AREA URNS LPF: PRESENT /LPF
NEUTROPHILS # BLD AUTO: 9.2 10E9/L (ref 1.6–8.3)
NEUTROPHILS NFR BLD AUTO: 71.1 %
NITRATE UR QL: NEGATIVE
NRBC # BLD AUTO: 0 10*3/UL
NRBC BLD AUTO-RTO: 0 /100
O2/TOTAL GAS SETTING VFR VENT: NORMAL %
PCO2 BLDV: 41 MM HG (ref 40–50)
PH BLDV: 7.37 PH (ref 7.32–7.43)
PH UR STRIP: 5 PH (ref 5–7)
PLATELET # BLD AUTO: 165 10E9/L (ref 150–450)
PO2 BLDV: 28 MM HG (ref 25–47)
POTASSIUM SERPL-SCNC: 3.7 MMOL/L (ref 3.4–5.3)
PROT SERPL-MCNC: 8.3 G/DL (ref 6.8–8.8)
RADIOLOGIST FLAGS: ABNORMAL
RBC # BLD AUTO: 4.87 10E12/L (ref 3.8–5.2)
RBC #/AREA URNS AUTO: <1 /HPF (ref 0–2)
SARS-COV-2 PCR COMMENT: NORMAL
SARS-COV-2 RNA SPEC QL NAA+PROBE: NEGATIVE
SARS-COV-2 RNA SPEC QL NAA+PROBE: NORMAL
SODIUM SERPL-SCNC: 135 MMOL/L (ref 133–144)
SOURCE: ABNORMAL
SP GR UR STRIP: 1.01 (ref 1–1.03)
SPECIMEN SOURCE: NORMAL
SPECIMEN SOURCE: NORMAL
SQUAMOUS #/AREA URNS AUTO: <1 /HPF (ref 0–1)
UROBILINOGEN UR STRIP-MCNC: NORMAL MG/DL (ref 0–2)
WBC # BLD AUTO: 13 10E9/L (ref 4–11)
WBC #/AREA URNS AUTO: 1 /HPF (ref 0–5)

## 2020-05-27 PROCEDURE — 25000128 H RX IP 250 OP 636: Performed by: EMERGENCY MEDICINE

## 2020-05-27 PROCEDURE — 99223 1ST HOSP IP/OBS HIGH 75: CPT | Mod: AI | Performed by: INTERNAL MEDICINE

## 2020-05-27 PROCEDURE — 82803 BLOOD GASES ANY COMBINATION: CPT | Performed by: EMERGENCY MEDICINE

## 2020-05-27 PROCEDURE — 93971 EXTREMITY STUDY: CPT | Mod: LT

## 2020-05-27 PROCEDURE — 85730 THROMBOPLASTIN TIME PARTIAL: CPT | Performed by: EMERGENCY MEDICINE

## 2020-05-27 PROCEDURE — 51702 INSERT TEMP BLADDER CATH: CPT

## 2020-05-27 PROCEDURE — 93976 VASCULAR STUDY: CPT

## 2020-05-27 PROCEDURE — 83605 ASSAY OF LACTIC ACID: CPT | Performed by: EMERGENCY MEDICINE

## 2020-05-27 PROCEDURE — 80053 COMPREHEN METABOLIC PANEL: CPT | Performed by: EMERGENCY MEDICINE

## 2020-05-27 PROCEDURE — 51798 US URINE CAPACITY MEASURE: CPT

## 2020-05-27 PROCEDURE — 83690 ASSAY OF LIPASE: CPT | Performed by: EMERGENCY MEDICINE

## 2020-05-27 PROCEDURE — 87040 BLOOD CULTURE FOR BACTERIA: CPT | Performed by: EMERGENCY MEDICINE

## 2020-05-27 PROCEDURE — 93005 ELECTROCARDIOGRAM TRACING: CPT

## 2020-05-27 PROCEDURE — 96376 TX/PRO/DX INJ SAME DRUG ADON: CPT | Mod: 59

## 2020-05-27 PROCEDURE — 36415 COLL VENOUS BLD VENIPUNCTURE: CPT | Performed by: INTERNAL MEDICINE

## 2020-05-27 PROCEDURE — 25000125 ZZHC RX 250: Performed by: EMERGENCY MEDICINE

## 2020-05-27 PROCEDURE — 25800030 ZZH RX IP 258 OP 636: Performed by: EMERGENCY MEDICINE

## 2020-05-27 PROCEDURE — 25000132 ZZH RX MED GY IP 250 OP 250 PS 637: Performed by: INTERNAL MEDICINE

## 2020-05-27 PROCEDURE — 96361 HYDRATE IV INFUSION ADD-ON: CPT | Mod: 59

## 2020-05-27 PROCEDURE — 12000000 ZZH R&B MED SURG/OB

## 2020-05-27 PROCEDURE — 81001 URINALYSIS AUTO W/SCOPE: CPT | Performed by: EMERGENCY MEDICINE

## 2020-05-27 PROCEDURE — 96365 THER/PROPH/DIAG IV INF INIT: CPT | Mod: 59

## 2020-05-27 PROCEDURE — 99285 EMERGENCY DEPT VISIT HI MDM: CPT | Mod: 25

## 2020-05-27 PROCEDURE — 71275 CT ANGIOGRAPHY CHEST: CPT

## 2020-05-27 PROCEDURE — 87086 URINE CULTURE/COLONY COUNT: CPT | Performed by: EMERGENCY MEDICINE

## 2020-05-27 PROCEDURE — 85025 COMPLETE CBC W/AUTO DIFF WBC: CPT | Performed by: EMERGENCY MEDICINE

## 2020-05-27 PROCEDURE — U0003 INFECTIOUS AGENT DETECTION BY NUCLEIC ACID (DNA OR RNA); SEVERE ACUTE RESPIRATORY SYNDROME CORONAVIRUS 2 (SARS-COV-2) (CORONAVIRUS DISEASE [COVID-19]), AMPLIFIED PROBE TECHNIQUE, MAKING USE OF HIGH THROUGHPUT TECHNOLOGIES AS DESCRIBED BY CMS-2020-01-R: HCPCS | Performed by: EMERGENCY MEDICINE

## 2020-05-27 PROCEDURE — 96366 THER/PROPH/DIAG IV INF ADDON: CPT

## 2020-05-27 PROCEDURE — 85610 PROTHROMBIN TIME: CPT | Performed by: EMERGENCY MEDICINE

## 2020-05-27 PROCEDURE — 85520 HEPARIN ASSAY: CPT | Performed by: INTERNAL MEDICINE

## 2020-05-27 PROCEDURE — 70450 CT HEAD/BRAIN W/O DYE: CPT

## 2020-05-27 RX ORDER — PROCHLORPERAZINE MALEATE 5 MG
5 TABLET ORAL EVERY 6 HOURS PRN
Status: DISCONTINUED | OUTPATIENT
Start: 2020-05-27 | End: 2020-05-30 | Stop reason: HOSPADM

## 2020-05-27 RX ORDER — LIDOCAINE 40 MG/G
CREAM TOPICAL
Status: DISCONTINUED | OUTPATIENT
Start: 2020-05-27 | End: 2020-05-27 | Stop reason: HOSPADM

## 2020-05-27 RX ORDER — ONDANSETRON 4 MG/1
4 TABLET, ORALLY DISINTEGRATING ORAL EVERY 6 HOURS PRN
Status: DISCONTINUED | OUTPATIENT
Start: 2020-05-27 | End: 2020-05-30 | Stop reason: HOSPADM

## 2020-05-27 RX ORDER — AMOXICILLIN 250 MG
2 CAPSULE ORAL 2 TIMES DAILY PRN
Status: DISCONTINUED | OUTPATIENT
Start: 2020-05-27 | End: 2020-05-30 | Stop reason: HOSPADM

## 2020-05-27 RX ORDER — HEPARIN SODIUM 10000 [USP'U]/100ML
1200 INJECTION, SOLUTION INTRAVENOUS CONTINUOUS
Status: DISCONTINUED | OUTPATIENT
Start: 2020-05-27 | End: 2020-05-27

## 2020-05-27 RX ORDER — LIDOCAINE 40 MG/G
CREAM TOPICAL
Status: DISCONTINUED | OUTPATIENT
Start: 2020-05-27 | End: 2020-05-30 | Stop reason: HOSPADM

## 2020-05-27 RX ORDER — DIPHENHYDRAMINE HCL 25 MG
25 TABLET ORAL 2 TIMES DAILY PRN
COMMUNITY

## 2020-05-27 RX ORDER — BISACODYL 10 MG
10 SUPPOSITORY, RECTAL RECTAL DAILY PRN
Status: DISCONTINUED | OUTPATIENT
Start: 2020-05-27 | End: 2020-05-30 | Stop reason: HOSPADM

## 2020-05-27 RX ORDER — ONDANSETRON 2 MG/ML
4 INJECTION INTRAMUSCULAR; INTRAVENOUS EVERY 6 HOURS PRN
Status: DISCONTINUED | OUTPATIENT
Start: 2020-05-27 | End: 2020-05-30 | Stop reason: HOSPADM

## 2020-05-27 RX ORDER — CITALOPRAM HYDROBROMIDE 20 MG/1
20 TABLET ORAL DAILY
Status: DISCONTINUED | OUTPATIENT
Start: 2020-05-27 | End: 2020-05-30 | Stop reason: HOSPADM

## 2020-05-27 RX ORDER — ACETAMINOPHEN 325 MG/1
650 TABLET ORAL EVERY 4 HOURS PRN
Status: DISCONTINUED | OUTPATIENT
Start: 2020-05-27 | End: 2020-05-30 | Stop reason: HOSPADM

## 2020-05-27 RX ORDER — HEPARIN SODIUM 10000 [USP'U]/100ML
1200 INJECTION, SOLUTION INTRAVENOUS CONTINUOUS
Status: DISCONTINUED | OUTPATIENT
Start: 2020-05-27 | End: 2020-05-27 | Stop reason: HOSPADM

## 2020-05-27 RX ORDER — NALOXONE HYDROCHLORIDE 0.4 MG/ML
.1-.4 INJECTION, SOLUTION INTRAMUSCULAR; INTRAVENOUS; SUBCUTANEOUS
Status: DISCONTINUED | OUTPATIENT
Start: 2020-05-27 | End: 2020-05-30 | Stop reason: HOSPADM

## 2020-05-27 RX ORDER — HEPARIN SODIUM 10000 [USP'U]/100ML
1200 INJECTION, SOLUTION INTRAVENOUS CONTINUOUS
Status: DISCONTINUED | OUTPATIENT
Start: 2020-05-27 | End: 2020-05-30

## 2020-05-27 RX ORDER — PROCHLORPERAZINE 25 MG
12.5 SUPPOSITORY, RECTAL RECTAL EVERY 12 HOURS PRN
Status: DISCONTINUED | OUTPATIENT
Start: 2020-05-27 | End: 2020-05-30 | Stop reason: HOSPADM

## 2020-05-27 RX ORDER — POLYETHYLENE GLYCOL 3350 17 G/17G
17 POWDER, FOR SOLUTION ORAL DAILY PRN
Status: DISCONTINUED | OUTPATIENT
Start: 2020-05-27 | End: 2020-05-30 | Stop reason: HOSPADM

## 2020-05-27 RX ORDER — IOPAMIDOL 755 MG/ML
500 INJECTION, SOLUTION INTRAVASCULAR ONCE
Status: COMPLETED | OUTPATIENT
Start: 2020-05-27 | End: 2020-05-27

## 2020-05-27 RX ORDER — AMOXICILLIN 250 MG
1 CAPSULE ORAL 2 TIMES DAILY PRN
Status: DISCONTINUED | OUTPATIENT
Start: 2020-05-27 | End: 2020-05-30 | Stop reason: HOSPADM

## 2020-05-27 RX ADMIN — SODIUM CHLORIDE 85 ML: 9 INJECTION, SOLUTION INTRAVENOUS at 15:38

## 2020-05-27 RX ADMIN — CITALOPRAM HYDROBROMIDE 20 MG: 20 TABLET ORAL at 20:25

## 2020-05-27 RX ADMIN — Medication 5000 UNITS: at 12:51

## 2020-05-27 RX ADMIN — SODIUM CHLORIDE 1000 ML: 9 INJECTION, SOLUTION INTRAVENOUS at 13:21

## 2020-05-27 RX ADMIN — HEPARIN SODIUM 1200 UNITS/HR: 10000 INJECTION, SOLUTION INTRAVENOUS at 12:51

## 2020-05-27 RX ADMIN — IOPAMIDOL 66 ML: 755 INJECTION, SOLUTION INTRAVENOUS at 15:37

## 2020-05-27 ASSESSMENT — ACTIVITIES OF DAILY LIVING (ADL): ADLS_ACUITY_SCORE: 22

## 2020-05-27 ASSESSMENT — ENCOUNTER SYMPTOMS
ABDOMINAL PAIN: 1
DIFFICULTY URINATING: 1
SHORTNESS OF BREATH: 1
CHILLS: 1
DIZZINESS: 1
HEMATURIA: 0
BACK PAIN: 1

## 2020-05-27 ASSESSMENT — MIFFLIN-ST. JEOR: SCORE: 1381

## 2020-05-27 NOTE — ED PROVIDER NOTES
"  History     Chief Complaint:  Urinary Retention and Shortness of Breath       HPI  Jessica Qiu is a 69 year old female who presents with urinary retention and shortness of breath. Patient has been unable to fully void for the last 5 days. She reports that her output is limited to \"dribbles\" and her bowel movements have been very dry and hard. Patient states that this has been accompanied with lower abdominal cramping and chills. She denies any increased urgency, hematuria, or pungent urine odor. She also reports increasing shortness of breath, problems walking from her left leg, and aching back pain over the last week with occasional episodes of dizziness and disorientation. Patient denies any cough, recent travel, or history of blood clots.There has not been any head trauma.    Patient underwent a right frontal ventriculoperitoneal shunt placement in March 2016 and was most recently treated for hydrocephalus in 2018 at [facility].     Allergies:  Simvastatin  Hmg-Coa-R Inhibitors  Pollen Extract     Medications:    Zyloprim  Optivar  Celexa    Past Medical History:    Gait disorder  Nephrolithiasis  Cognitive decline  Scoliosis  Hydrocephalus  Aortic carlene insufficiency  Depression  Anxiety     Past Surgical History:     shunt placement   Breast surgery  GI surgery, kidney stones  Lithotripsy, uric acid stone  Tonsillectomy    Family History:    Cerebrovascular disease, brain aneurysm  Colorectal cancer    Social History:  Smoking status: never  Alcohol use: yes  Drug use: no  PCP: Burke Garcia    Marital Status:       Review of Systems   Constitutional: Positive for chills.   Respiratory: Positive for shortness of breath.    Gastrointestinal: Positive for abdominal pain.   Genitourinary: Positive for difficulty urinating. Negative for hematuria and urgency.   Musculoskeletal: Positive for back pain.   Neurological: Positive for dizziness.   All other systems reviewed and are " "negative.      Physical Exam     Patient Vitals for the past 24 hrs:   BP Temp Temp src Pulse Resp SpO2 Height Weight   05/27/20 1345 128/79 -- -- -- -- -- -- --   05/27/20 1330 -- -- -- -- -- 98 % -- --   05/27/20 1315 135/86 -- -- -- -- 98 % -- --   05/27/20 1229 -- -- -- -- -- -- 1.626 m (5' 4\") 87.6 kg (193 lb 2 oz)   05/27/20 1215 128/76 -- -- -- -- 95 % -- --   05/27/20 1100 137/82 -- -- 90 -- 95 % -- --   05/27/20 1045 124/83 -- -- -- -- 95 % -- --   05/27/20 0955 (!) 127/90 98.1  F (36.7  C) Oral 106 20 97 % -- --       Physical Exam  General: The patient is alert, in no respiratory distress.    HENT: Mucous membranes moist.  No signs of trauma    Cardiovascular: Tachycardia but  regular. Upper extremity pulses equal. Plulses present in Bilateral lower extremities, mildly diminished in Left, but swelling present.  There are dopplerable pulses present in both lower extremities.    Respiratory: Lungs are clear. No nasal flaring. No retractions. No wheezing, no crackles.    Gastrointestinal: Abdomen soft fullness. Discomfort with palpation in the lower abdomen.    Musculoskeletal: No gross deformity.     Skin: Significant swelling and congestion in L Lower extemity extending to the groin    Neurologic: The patient is alert and oriented x3. GCS 15. No testable cranial nerve deficit. Follows commands with clear and appropriate speech. Gives appropriate answers. Difficulty in moving L lower extremity. .     Lymphatic: No cervical adenopathy. Significant edema of Left leg extending to the groin.    Psychiatric: The patient is non-tearful.    Emergency Department Course     ECG (10:38:26):  Interpreted by Adriano Knapp MD.    Vent. rate 95 BPM  IL interval 160 ms  QRS duration 76 ms  QT/QTc 370/464 ms  P-R-T axes 5 -20 43    Moderate voltage criteria for LVH, may be normal variant. Borderline ECG    Imaging:  Radiology findings were communicated with the patient who voiced understanding of the " findings.    US Abdomen/pelvis, doppler limited  DVT in the distal IVC extending into the iliac veins as  above.  Per radiology    US Left lower extremity venous duplex  Extensive left lower extremity and caval deep venous  thrombus.  Per radiology    Laboratory:  Laboratory findings were communicated with the patient who voiced understanding of the findings.    CBC: WBC 13.0 (H), HGB 14.9,     CMP: Glucose 120 (H), GFR 57 (L), o/w WNL (Creatinine 1.00)    INR: 1.11    Lactic Acid (Resulted: 1108): 2.6 (H)    Lipase: 72 (L)    Urine analysis: Blood trace (A), Bacteria few (A), Mucous present (A), Hyaline casts 4 (H) o/w WNL    Partial thromboplastin time: 29    Blood Gas venous: pH 7.37, PCO2 41, PO2 28, Bicarbonate 24, Room air    Blood culture x2: pending  Urine culture: pending    Interventions:   1251 Heparin, 5000 units, IV  1251 Heparin, 1200 units/hr, IV  1321 0.9% NS Bolus, 1000 mL, IV    Emergency Department Course:    0959  Past medical records, nursing notes, and vitals reviewed.    1007  I performed an exam of the patient and obtained history, as documented above.    1024 IV was inserted and blood was drawn for laboratory testing, results above.    1025 I spoke with Dr. Christian of  regarding patient's presentation, findings, and plan of care.    1038 EKG was taken in the ED.     1125 The patient was sent for ultrasounds while in the emergency department, results above.     1347  I discussed the case with Dr Gray, neurosurgery at [facility]. He does not feel that her symptoms of dizziness are due to a shunt malfunction.     1406 Findings and plan explained to the Patient. Patient will be transferred to [facility] via EMS. Discussed the case with Dr. Chi, who will admit the patient to a monitored bed for further monitoring, evaluation, and treatment.    I personally reviewed the laboratory & imaging results with the Patient and answered all related questions prior to transfer.      Impression & Plan     Medical Decision Making:  Jessica Qiu is a 69 year old female who presents to the emergency department today for evaluation of significant left leg swelling.  The patient reports a history of a  shunt 2 years ago I did discuss the case with neurosurgery they do not feel that her urinary retention or description of dizziness fits this.  In further questioning the patient it sounds like trouble with moving of her leg is the difficulty she has walking.  Her urinary retention here was only 100 mL's we placed a Quintero catheter on her her kidney function is adequate.  She was slightly tachycardic at first I did consider PE however heart rate came down with extensive swelling of her leg I did immediately call interventional radiology they did not return the call and the page and suggested a CT and ultrasound which was performed there is extensive clot extending up to the cava which will need intervention.  Patient was transferred to [facility] heparin had been started almost immediate when she arrived for the IV placement she is otherwise stable will be transferred to [facility] for planned intervention critical care time this patient is 80 minutes in excluding procedures.  There are adequate pulses in both extremities currently.    Diagnosis:    ICD-10-CM    1. 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 Hydrocephalus, unspecified type (H)  G91.9    3. Tachycardia  R00.0    4. Elevated lactic acid level  R79.89       Disposition:  Transferred to [facility].     Discharge Medications:  New Prescriptions    No medications on file       Scribe Disclosure:  IBrenda, am serving as a scribe at 10:07 AM on 5/27/2020 to document services personally performed by Adriano Knapp MD based on my observations and the provider's statements to me.      Brenda Uribe   5/27/2020    [facility] EMERGENCY  DEPARTMENT     Adriano Knapp MD  05/27/20 7484

## 2020-05-27 NOTE — H&P
"Admitted:     05/27/2020      CHIEF COMPLAINT:  Urinary retention, unsteady gait and shortness of breath.      HISTORY OF PRESENT ILLNESS:  Jessica Qiu is a 69-year-old female with a past medical history significant for idiopathic normal pressure hydrocephalus status post  shunt placement in 2016, history of nephrolithiasis, dyslipidemia and anxiety, among other medical problems, who initially presented to the Emergency Room at [facility] today for evaluation of constellation of symptoms including urinary retention and shortness of breath.  History is obtained per discussions with the patient, as well as review of medical record.      The patient initially presented to the Emergency Room due to perceived difficulties with urinating.  She said in the past 5 days she had had only \"dribbles\" when attempting to urinate.  She also endorsed some changes in her bowel habits, and that her stools have become more hard appearing.  She has had some vague complaints of pain in her back and in her sides.  When I saw her this evening, she tells me she \"felt generally unwell\" for the past 4 days now and describes feeling ill as though she \"has the flu.\"  She does not specifically note any fevers or chills.  She denies myalgias.  She has had some shortness of breath, and she said she \"noticed myself panting more frequently.\"  No reports of cough or hemoptysis.  She has otherwise been eating and drinking without difficulties.  She says she is typically active and her and her spouse walk roughly 1 mile a day.  She had remained active up until Saturday or so and has since been less active due to reports of feeling progressively tired.  There was also some question of having had some changes in her gait described as an unsteady gait.  She has had no recent injury.       She was seen and evaluated by Dr. Adriano Knapp in the Emergency Room.  She was initially mildly tachycardic with heart rates in the 20s, but " blood pressures were stable.  O2 sats were in the 90s on room air, and she was otherwise afebrile.  She had noticeable left lower extremity edema.  Basic labs were done and were relatively unremarkable.  Her electrolytes and renal function were within normal limits.  Her white count was mildly elevated at 13.0, and her lactate was minimally elevated at 2.6.  A urine sample was ultimately obtained and was not suggestive of infection.  Given her history of a  shunt placement and reports of a change in urinary habits and gait, a head CT was done which was unremarkable.  The presentation was discussed with Dr. Gray, on-call neurosurgeon from [facility] where she had been seen in the past.  It was felt that a shunt malfunction was unlikely to be the cause of her symptoms.  Because of her complaints of shortness of breath and tachycardia with noted left lower extremity swelling, an ultrasound of the lower extremity was obtained and showed extensive left lower extremity and deep venous thrombosis.  An ultrasound of the abdomen and pelvis was also done, which showed a DVT was present in the distal IVC with extension into the iliac veins.  A CT of the chest with PE protocol was obtained and showed multiple a small bilateral pulmonary emboli.  She was also noted to have extensive venous thrombus, extending from the origin of the left common iliac vein into the internal and external iliac veins and at least to the femoral vein.  Dr. Knapp discussed the patient's presentation with Dr. Coats on-call for Interventional Radiology, and it was recommended to transfer to [facility] in order to undergo intervention and thrombectomy.  She was initiated on a heparin drip prior to transfer.  She was swabbed for COVID19 in the emergency room (asypmtomatic testing, to be done prior to undergoing interventional procedure).  A renner catheter was also placed in the emergency room.     I saw the patient shortly after  her arrival to the floor.  She is resting comfortably, alert and conversing in complete sentences.  She does not endorse any discomfort at present.      PAST MEDICAL HISTORY:     1.  Idiopathic normal pressure hydrocephalus.  This was diagnosed in 2016, and she previously underwent  shunt placement in 03/2016 at the [facility].  She was last seen by Neurosurgery at the [facility] in the spring of 2019 and had plans to establish with a neurosurgeon locally.   2.  History of dilated ascending aorta.  This was noted on an echocardiogram in 04/2018.  Her ascending aorta at that time was moderately dilated at 4.6 cm.   3.  History of nephrolithiasis.  She is maintained on daily allopurinol due to history of uric acid staghorn calculi.   4.  Osteopenia.   5.  Dyslipidemia.   6.  Anxiety.      PAST SURGICAL HISTORY:  Bilateral cataract removal with implant in 2013, history of right  shunt placement in 03/2016 at the [facility], history of breast biopsy in the past, history of bilateral LASIK surgeries, remote tonsillectomy and history of removal of a Bartholin gland or cyst.      FAMILY HISTORY:  Her father has a history of colon cancer, dyslipidemia and Alzheimer's dementia.  She also reports he had a history of blood clots and was on warfarin, though she did not recall the exact etiology of his blood clots.  Her mother had a history of aneurysm.      SOCIAL HISTORY:  She has a history of tobacco use but quit smoking in 1980.  She drinks alcohol on occasion.  She lives at home with her spouse.      HOME MEDICATIONS:  Per PCP records   1.  Allopurinol 300 mg daily.   2.  Celexa 20 mg daily.   3.  Azelastine ophthalmic solution for allergic conjunctivitis, 1 drop to both eyes b.i.d.   4.  Flonase 2 sprays to both nostrils daily.   5.  Benadryl 25mg three times daily as needed.      ALLERGIES:  SIMVASTATIN CAUSES MUSCLE ACHES AND WEAKNESS.  POLLEN EXTRACTS CAUSE HAY FEVER.        REVIEW OF SYSTEMS:  A full 10-point  review of systems was discussed with this patient and negative unless otherwise stated per HPI.      PHYSICAL EXAMINATION:   VITAL SIGNS:  Temperature 98.1, pulse 78, respirations 16, blood pressure 120/68 with O2 sat 94% on room air.   GENERAL:  The patient is a well-nourished, well-developed female.  She appears her stated age and is alert, conversing appropriately, in no acute distress.   HEENT:  Pupils equal, round, AND reactive to light and accommodation.  Extraocular movements are intact.  Mucous membranes moist.   CARDIOVASCULAR:  Heart rate and rhythm regular.  No murmurs, gallops, OR rubs.  Pulses are +2 and symmetric in bilateral upper extremities.  She has significant left lower extremity edema to the groin as compared to the right lower extremity edema.   RESPIRATORY:  Lungs are clear to auscultation bilaterally, free of rales or rhonchi.  No increased work of breathing or accessory muscle use.   ABDOMEN:  Soft, nontender, nondistended.  Positive bowel sounds throughout.   GENITOURINARY:  Quintero is in place and draining clear yellow urine.   INTEGUMENTARY:  Warm and dry.  No rashes, jaundice or ecchymosis.   NEUROLOGIC:  Cranial nerves II-XII are grossly intact.  No focal motor or sensory deficits.  Gait was not assessed.      LABORATORY DATA AND LABS:  Obtained in [facility] ED show BMP with sodium 135, potassium 3.7, creatinine 1.00.  LFTs were all within normal limits.  Glucose 120.  Initial lactate was 2.6; on repeat after intervention in the ER improved to 1.5.  Lipase 72.  CBC showed white count of 13.0, hemoglobin of 14.9, and platelet count of 165.  INR is 1.11.      Urinalysis showed 1 white cell with negative leukocyte esterase, negative nitrites, trace blood with less than 1 RBC.      Urine and blood cultures are pending at this time.      Head CT done due to reports of balance problems with history of  shunt placement showed no acute pathology, no evidence of hydrocephalus and no change  in size of ventricles since last imaging in 2018.      A CT of the chest, abdomen and pelvis with contrast showed multiple small bilateral pulmonary emboli and extensive venous thrombus, extending from the origin of left common iliac vein, involving the internal and external iliac veins and at least the left femoral vein, as well as an indeterminate enlargement in the cervical region, an incidental right pulmonary nodule and ascending thoracic aorta at 4.6 cm.      Ultrasound of the abdomen and pelvis showed a DVT in the distal IVC, extending into the iliac veins.  More specifically, there was an occlusive thrombus in the distal IVC, extending to the left common and external iliac veins.      An ultrasound of the left lower extremity showed extensive occlusive thrombus from the distal IVC to left common iliac and external veins into the left common femoral vein and superficial femoral vein, popliteal vein and posterior tibial veins at the level of the ankle.  There was also superficial venous thrombus in the left greater saphenous vein to the mid-thigh from the saphenofemoral junction.      EKG in the Emergency Room showed normal sinus rhythm with no acute ischemic changes.      ASSESSMENT AND PLAN:  Rahel Qiu is a 69-year-old female with past medical history of idiopathic and normal pressure hydrocephalus, status post ventriculoperitoneal () shunt placement in 2016, history of nephrolithiasis, anxiety and dyslipidemia, who presents to the Emergency Room today for constellation of symptoms including urinary retention, changes in her gait and shortness of breath.  She was ultimately found to have an extensive left lower extremity deep venous thrombosis (DVT) and  bilateral pulmonary emboli.  She was transferred to [facility] and admitted for ongoing evaluation and care.   1.  Extensive left lower extremity occlusive DVT and multiple bilateral pulmonary emboli.  The patient relays feeling unwell  "for the past 4 days or so now but does not specifically endorse any injury or prolonged periods of immobility.  Some of her complaints included worsening of shortness of breath and abdominal and back pain.  She was found to have an extensive left lower extremity DVT as outlined above and multiple bilateral pulmonary emboli.  She has been initiated on a heparin drip.  Dr. Coats is aware of patient's transfer to [facility] and is planning to perform a thrombectomy in the morning.  She will be maintained on heparin drip this evening.  O2 sats are presently in the 90s on room air, and her respiratory status is otherwise stable.  Can utilize O2 as needed.  PRNs are available for pain, though she appears comfortable right now.  Etiology of her clot is unclear at this time.  Vascular Medicine Service has been consulted, and their assistance with ongoing evaluation and eventual transition to oral anticoagulant is much appreciated.  I anticipate she may need further exploration of her routine cancer screening exams to ensure she is up-to-date in that regard.  I will defer to Vascular Medicine as to whether or not to pursue a hypercoagulable workup.  This is the patient's first blood clot.  COVID19 swab is pending at this time.  2.  Urinary retention.  Unclear etiology.  Per the patient's presentation today was that she had had some difficulties urinating in recent days and had reportedly only been able to urinate \"dribbles.\"  A Quintero catheter was placed in the [facility] Emergency Room tonight.  Following thrombectomy, would will encourage ambulation and consider a voiding trial at hospitalization.  Her head CT today showed no worsening of her hydrocephalus and per discussions with the ED physician and neurosurgeon on-call for [place], it was thought that a shunt malfunction was unlikely to be contributing to her current presentation.  Her urinalysis not suggestive of infection at this time.  Unclear if her urinary " difficulties may be in part related to her extensive thrombosis as above.   3.  History of nephrolithiasis.  She has a history of staghorn calculi and is on uric acid for staghorn calculi and is on chronic treatment with allopurinol.  This will be held for now.  Can resume following thrombectomy.   4.  Anxiety.  Chronic and stable on Celexa.  This can be continued.   5.  Idiopathic normal pressure hydrocephalus, status post  shunt placement in 2016.  Stable.  Previously followed at [facility] and was last seen there in 2019.  No concerns with shunt malfunction on assessment this evening.     Deep venous thrombosis prophylaxis.  She will be on heparin drip as above.      CODE STATUS:  Full code as was discussed with the patient this evening.         MASON FELIZ DO             D: 2020   T: 2020   MT:       Name:     ANISA CARTY   MRN:      [id number]        Account:      [id number]   :      1950        Admitted:     2020                   Document: [id number]       cc: Burke Garcia MD

## 2020-05-27 NOTE — PHARMACY-ADMISSION MEDICATION HISTORY
"Pharmacy Medication History  Admission medication history interview status for the 5/27/2020  admission is complete. See EPIC admission navigator for prior to admission medications     Medication history sources: Patient, Surescripts and Pharmacy (Buddy Briceño)  Medication history source reliability: Moderate  Adherence assessment: Moderate    Significant changes made to the medication list:  D/C'd MVI and Fish Oil.  Changes Allopurinol and Optivar and Flonase to as needed.  Added Benadryl prn.      Additional medication history information:   none    Medication reconciliation completed by provider prior to medication history? No    Time spent in this activity: 30\"      Prior to Admission medications    Medication Sig Last Dose Taking? Auth Provider   citalopram (CELEXA) 20 MG tablet TAKE 1 TABLET BY MOUTH ONE TIME DAILY  Patient taking differently: Take 20 mg by mouth every morning  5/26/2020 at am Yes Charlene Huynh MD   allopurinol (ZYLOPRIM) 300 MG tablet Take 1 tablet (300 mg) by mouth daily  Patient taking differently: Take 300 mg by mouth daily as needed  More than a month at am  Brodie Majano PA-C   azelastine (OPTIVAR) 0.05 % SOLN ophthalmic solution Apply 1 drop to eye 2 times daily  Patient taking differently: Apply 1 drop to eye 2 times daily as needed (allergies)  prn  Brodie Majano PA-C   diphenhydrAMINE (BENADRYL) 25 MG tablet Take 25 mg by mouth 2 times daily as needed for allergies  prn  Unknown, Entered By History   fluticasone (FLONASE) 50 MCG/ACT spray Spray 1-2 sprays into both nostrils daily  Patient taking differently: Spray 1-2 sprays into both nostrils daily as needed  prn  Brodie Majano PA-C       "

## 2020-05-27 NOTE — PROVIDER NOTIFICATION
RODO Adam @ 18:02 on 5/27/20.  Clarification of Heparin drip protocol is DVT/PE High Intensity protocol with Heparin 10 a goal = 0.3-0.7 IU/mL.  Osiris Marroquin [facility]

## 2020-05-27 NOTE — ED NOTES
Bladder scanned Pt. 100ml noted. MD requested catheter to drain Pt. Bladder. Quick cath performed on Pt. To drain bladder. Pt. Tolerated well. UA sent. Pt complained of being cold, warm blanket brought to pt.

## 2020-05-28 ENCOUNTER — APPOINTMENT (OUTPATIENT)
Dept: INTERVENTIONAL RADIOLOGY/VASCULAR | Facility: CLINIC | Age: 70
DRG: 270 | End: 2020-05-28
Attending: HOSPITALIST
Payer: COMMERCIAL

## 2020-05-28 DIAGNOSIS — R09.89 OTHER SPECIFIED SYMPTOMS AND SIGNS INVOLVING THE CIRCULATORY AND RESPIRATORY SYSTEMS: Primary | ICD-10-CM

## 2020-05-28 DIAGNOSIS — I82.492 ACUTE EMBOLISM AND THROMBOSIS OF OTHER SPECIFIED DEEP VEIN OF LEFT LOWER EXTREMITY (H): ICD-10-CM

## 2020-05-28 DIAGNOSIS — I82.409 DVT (DEEP VENOUS THROMBOSIS) (H): ICD-10-CM

## 2020-05-28 LAB
ANION GAP SERPL CALCULATED.3IONS-SCNC: 7 MMOL/L (ref 3–14)
BACTERIA SPEC CULT: NO GROWTH
BUN SERPL-MCNC: 11 MG/DL (ref 7–30)
CALCIUM SERPL-MCNC: 9.2 MG/DL (ref 8.5–10.1)
CHLORIDE SERPL-SCNC: 110 MMOL/L (ref 94–109)
CO2 SERPL-SCNC: 20 MMOL/L (ref 20–32)
CREAT SERPL-MCNC: 0.81 MG/DL (ref 0.52–1.04)
ERYTHROCYTE [DISTWIDTH] IN BLOOD BY AUTOMATED COUNT: 14.1 % (ref 10–15)
GFR SERPL CREATININE-BSD FRML MDRD: 73 ML/MIN/{1.73_M2}
GLUCOSE SERPL-MCNC: 107 MG/DL (ref 70–99)
HCT VFR BLD AUTO: 42.5 % (ref 35–47)
HGB BLD-MCNC: 13.8 G/DL (ref 11.7–15.7)
KCT BLD-ACNC: 239 SEC (ref 75–150)
KCT BLD-ACNC: 247 SEC (ref 75–150)
KCT BLD-ACNC: 267 SEC (ref 75–150)
KCT BLD-ACNC: 271 SEC (ref 75–150)
KCT BLD-ACNC: 275 SEC (ref 75–150)
LMWH PPP CHRO-ACNC: 0.57 IU/ML
LMWH PPP CHRO-ACNC: 0.6 IU/ML
Lab: NORMAL
MCH RBC QN AUTO: 30.7 PG (ref 26.5–33)
MCHC RBC AUTO-ENTMCNC: 32.5 G/DL (ref 31.5–36.5)
MCV RBC AUTO: 94 FL (ref 78–100)
PLATELET # BLD AUTO: 159 10E9/L (ref 150–450)
POTASSIUM SERPL-SCNC: 3.6 MMOL/L (ref 3.4–5.3)
RBC # BLD AUTO: 4.5 10E12/L (ref 3.8–5.2)
SODIUM SERPL-SCNC: 137 MMOL/L (ref 133–144)
SPECIMEN SOURCE: NORMAL
WBC # BLD AUTO: 13.7 10E9/L (ref 4–11)

## 2020-05-28 PROCEDURE — 37187 VENOUS MECH THROMBECTOMY: CPT

## 2020-05-28 PROCEDURE — 25000128 H RX IP 250 OP 636: Performed by: RADIOLOGY

## 2020-05-28 PROCEDURE — 80048 BASIC METABOLIC PNL TOTAL CA: CPT | Performed by: INTERNAL MEDICINE

## 2020-05-28 PROCEDURE — C1769 GUIDE WIRE: HCPCS

## 2020-05-28 PROCEDURE — 27211193 ZZ H WOUND GLUE CR1

## 2020-05-28 PROCEDURE — 27210886 ZZH ACCESSORY CR5

## 2020-05-28 PROCEDURE — 85520 HEPARIN ASSAY: CPT | Performed by: INTERNAL MEDICINE

## 2020-05-28 PROCEDURE — 27211422 ZZ H SHEATH CR23

## 2020-05-28 PROCEDURE — 12000000 ZZH R&B MED SURG/OB

## 2020-05-28 PROCEDURE — C1757 CATH, THROMBECTOMY/EMBOLECT: HCPCS

## 2020-05-28 PROCEDURE — 25800030 ZZH RX IP 258 OP 636: Performed by: RADIOLOGY

## 2020-05-28 PROCEDURE — 27210742 ZZH CATH CR1

## 2020-05-28 PROCEDURE — 85027 COMPLETE CBC AUTOMATED: CPT | Performed by: INTERNAL MEDICINE

## 2020-05-28 PROCEDURE — 27210804 ZZH SHEATH CR3

## 2020-05-28 PROCEDURE — 99223 1ST HOSP IP/OBS HIGH 75: CPT | Performed by: INTERNAL MEDICINE

## 2020-05-28 PROCEDURE — 99232 SBSQ HOSP IP/OBS MODERATE 35: CPT | Performed by: HOSPITALIST

## 2020-05-28 PROCEDURE — 36415 COLL VENOUS BLD VENIPUNCTURE: CPT | Performed by: INTERNAL MEDICINE

## 2020-05-28 PROCEDURE — 85347 COAGULATION TIME ACTIVATED: CPT

## 2020-05-28 PROCEDURE — 25500064 ZZH RX 255 OP 636: Performed by: RADIOLOGY

## 2020-05-28 PROCEDURE — 25000125 ZZHC RX 250: Performed by: NURSE PRACTITIONER

## 2020-05-28 PROCEDURE — 04CJ3ZZ EXTIRPATION OF MATTER FROM LEFT EXTERNAL ILIAC ARTERY, PERCUTANEOUS APPROACH: ICD-10-PCS | Performed by: RADIOLOGY

## 2020-05-28 PROCEDURE — 25000128 H RX IP 250 OP 636: Performed by: NURSE PRACTITIONER

## 2020-05-28 PROCEDURE — 25000132 ZZH RX MED GY IP 250 OP 250 PS 637: Performed by: INTERNAL MEDICINE

## 2020-05-28 PROCEDURE — 36010 PLACE CATHETER IN VEIN: CPT

## 2020-05-28 PROCEDURE — 36011 PLACE CATHETER IN VEIN: CPT

## 2020-05-28 PROCEDURE — 25000128 H RX IP 250 OP 636: Performed by: INTERNAL MEDICINE

## 2020-05-28 RX ORDER — NALOXONE HYDROCHLORIDE 0.4 MG/ML
.1-.4 INJECTION, SOLUTION INTRAMUSCULAR; INTRAVENOUS; SUBCUTANEOUS
Status: DISCONTINUED | OUTPATIENT
Start: 2020-05-28 | End: 2020-05-28 | Stop reason: HOSPADM

## 2020-05-28 RX ORDER — HEPARIN SODIUM 200 [USP'U]/100ML
1 INJECTION, SOLUTION INTRAVENOUS CONTINUOUS PRN
Status: DISCONTINUED | OUTPATIENT
Start: 2020-05-28 | End: 2020-05-28 | Stop reason: HOSPADM

## 2020-05-28 RX ORDER — HEPARIN SODIUM 1000 [USP'U]/ML
500-6000 INJECTION, SOLUTION INTRAVENOUS; SUBCUTANEOUS
Status: COMPLETED | OUTPATIENT
Start: 2020-05-28 | End: 2020-05-28

## 2020-05-28 RX ORDER — FENTANYL CITRATE 50 UG/ML
25-50 INJECTION, SOLUTION INTRAMUSCULAR; INTRAVENOUS EVERY 5 MIN PRN
Status: DISCONTINUED | OUTPATIENT
Start: 2020-05-28 | End: 2020-05-28 | Stop reason: HOSPADM

## 2020-05-28 RX ORDER — IOPAMIDOL 612 MG/ML
100 INJECTION, SOLUTION INTRAVASCULAR ONCE
Status: COMPLETED | OUTPATIENT
Start: 2020-05-28 | End: 2020-05-28

## 2020-05-28 RX ORDER — FLUMAZENIL 0.1 MG/ML
0.2 INJECTION, SOLUTION INTRAVENOUS
Status: DISCONTINUED | OUTPATIENT
Start: 2020-05-28 | End: 2020-05-28 | Stop reason: HOSPADM

## 2020-05-28 RX ADMIN — MIDAZOLAM HYDROCHLORIDE 0.5 MG: 1 INJECTION, SOLUTION INTRAMUSCULAR; INTRAVENOUS at 12:56

## 2020-05-28 RX ADMIN — HEPARIN SODIUM 1200 UNITS/HR: 10000 INJECTION, SOLUTION INTRAVENOUS at 05:21

## 2020-05-28 RX ADMIN — FENTANYL CITRATE 50 MCG: 50 INJECTION, SOLUTION INTRAMUSCULAR; INTRAVENOUS at 11:56

## 2020-05-28 RX ADMIN — HEPARIN SODIUM 8000 UNITS: 1000 INJECTION INTRAVENOUS; SUBCUTANEOUS at 11:04

## 2020-05-28 RX ADMIN — FENTANYL CITRATE 25 MCG: 50 INJECTION, SOLUTION INTRAMUSCULAR; INTRAVENOUS at 11:33

## 2020-05-28 RX ADMIN — MIDAZOLAM HYDROCHLORIDE 0.5 MG: 1 INJECTION, SOLUTION INTRAMUSCULAR; INTRAVENOUS at 11:31

## 2020-05-28 RX ADMIN — IOPAMIDOL 110 ML: 612 INJECTION, SOLUTION INTRAVENOUS at 13:23

## 2020-05-28 RX ADMIN — FENTANYL CITRATE 25 MCG: 50 INJECTION, SOLUTION INTRAMUSCULAR; INTRAVENOUS at 11:16

## 2020-05-28 RX ADMIN — FENTANYL CITRATE 25 MCG: 50 INJECTION, SOLUTION INTRAMUSCULAR; INTRAVENOUS at 11:30

## 2020-05-28 RX ADMIN — FENTANYL CITRATE 25 MCG: 50 INJECTION, SOLUTION INTRAMUSCULAR; INTRAVENOUS at 12:40

## 2020-05-28 RX ADMIN — MIDAZOLAM HYDROCHLORIDE 1 MG: 1 INJECTION, SOLUTION INTRAMUSCULAR; INTRAVENOUS at 10:54

## 2020-05-28 RX ADMIN — ACETAMINOPHEN 650 MG: 325 TABLET, FILM COATED ORAL at 07:45

## 2020-05-28 RX ADMIN — CITALOPRAM HYDROBROMIDE 20 MG: 20 TABLET ORAL at 07:45

## 2020-05-28 RX ADMIN — HEPARIN SODIUM 1 BAG: 200 INJECTION, SOLUTION INTRAVENOUS at 11:45

## 2020-05-28 RX ADMIN — MIDAZOLAM HYDROCHLORIDE 0.5 MG: 1 INJECTION, SOLUTION INTRAMUSCULAR; INTRAVENOUS at 11:33

## 2020-05-28 RX ADMIN — LIDOCAINE HYDROCHLORIDE 13 ML: 10 INJECTION, SOLUTION INFILTRATION; PERINEURAL at 13:21

## 2020-05-28 RX ADMIN — MIDAZOLAM HYDROCHLORIDE 0.5 MG: 1 INJECTION, SOLUTION INTRAMUSCULAR; INTRAVENOUS at 11:56

## 2020-05-28 RX ADMIN — SODIUM CHLORIDE 500 ML: 9 INJECTION, SOLUTION INTRAVENOUS at 13:50

## 2020-05-28 RX ADMIN — MIDAZOLAM HYDROCHLORIDE 0.5 MG: 1 INJECTION, SOLUTION INTRAMUSCULAR; INTRAVENOUS at 11:16

## 2020-05-28 RX ADMIN — FENTANYL CITRATE 50 MCG: 50 INJECTION, SOLUTION INTRAMUSCULAR; INTRAVENOUS at 10:54

## 2020-05-28 RX ADMIN — MIDAZOLAM HYDROCHLORIDE 0.5 MG: 1 INJECTION, SOLUTION INTRAMUSCULAR; INTRAVENOUS at 12:40

## 2020-05-28 RX ADMIN — HEPARIN SODIUM 1 BAG: 200 INJECTION, SOLUTION INTRAVENOUS at 11:09

## 2020-05-28 RX ADMIN — HEPARIN SODIUM 2000 UNITS: 1000 INJECTION INTRAVENOUS; SUBCUTANEOUS at 11:55

## 2020-05-28 RX ADMIN — MIDAZOLAM HYDROCHLORIDE 0.5 MG: 1 INJECTION, SOLUTION INTRAMUSCULAR; INTRAVENOUS at 13:09

## 2020-05-28 RX ADMIN — HEPARIN SODIUM 1 BAG: 200 INJECTION, SOLUTION INTRAVENOUS at 11:08

## 2020-05-28 RX ADMIN — FENTANYL CITRATE 25 MCG: 50 INJECTION, SOLUTION INTRAMUSCULAR; INTRAVENOUS at 13:09

## 2020-05-28 ASSESSMENT — ACTIVITIES OF DAILY LIVING (ADL)
ADLS_ACUITY_SCORE: 15
ADLS_ACUITY_SCORE: 15
ADLS_ACUITY_SCORE: 21
ADLS_ACUITY_SCORE: 21

## 2020-05-28 NOTE — PROGRESS NOTES
"[facility]    Medicine Progress Note - Hospitalist Service       Date of Admission:  5/27/2020  Assessment & Plan    Rhael Qiu is a 69-year-old female with past medical history of idiopathic and normal pressure hydrocephalus, status post ventriculoperitoneal () shunt placement in 2016, history of nephrolithiasis, anxiety and dyslipidemia, who presents to the Emergency Room today for constellation of symptoms including urinary retention, changes in her gait and shortness of breath.  She was ultimately found to have an extensive left lower extremity deep venous thrombosis (DVT) and  bilateral pulmonary emboli.  She was transferred to [facility] and admitted for ongoing evaluation and care.     Extensive left lower extremity occlusive DVT and multiple bilateral pulmonary emboli.    - s/p thrombectomy today. No IVC filter placed as thrombus was completely removed.  - continue heparin for now    Urinary retention.  Unclear etiology.  Per the patient's presentation today was that she had had some difficulties urinating in recent days and had reportedly only been able to urinate \"dribbles.\"  A Quintero catheter was placed in the [facility] Room tonight.  Following thrombectomy, would will encourage ambulation and consider a voiding trial at hospitalization.  Her head CT today showed no worsening of her hydrocephalus and per discussions with the ED physician and neurosurgeon on-call for [place], it was thought that a shunt malfunction was unlikely to be contributing to her current presentation.  Her urinalysis not suggestive of infection at this time.  Unclear if her urinary difficulties may be in part related to her extensive thrombosis as above.     History of nephrolithiasis.  She has a history of staghorn calculi and is on uric acid for staghorn calculi and is on chronic treatment with allopurinol.  This will be held for now.  Can resume following thrombectomy. " "    Anxiety.  Chronic and stable on Celexa.  This can be continued.     Idiopathic normal pressure hydrocephalus, status post  shunt placement in 2016.  Stable.  Previously followed at [facility] and was last seen there in 2019.  No concerns with shunt malfunction on assessment this evening.        Diet: NPO for Medical/Clinical Reasons Except for: Meds, Ice Chips    DVT Prophylaxis: heparin gtt  Quintero Catheter: in place, indication: Retention  Code Status: Full Code           Disposition Plan   Expected discharge: Tomorrow, recommended to prior living arrangement once anticoagulation plan established.  Entered: Cheyenne Lofton MD 05/28/2020, 2:14 PM       The patient's care was discussed with the Bedside Nurse and Patient.    Cheyenne Lofton MD  Hospitalist Service  [facility]    ______________________________________________________________________    Interval History   Saw patient this morning prior to procedure. She was happy to hear procedure would be happening today. No acute complaints. No SOB or CP.     Seen after procedure pt said she felt \"great.\" I attempted to call daughter Elizabeth nelson to answer questions but her phone is not accepting calls. Verified number with patient.    Data reviewed today: I reviewed all medications, new labs and imaging results over the last 24 hours. I personally reviewed no images or EKG's today.    Physical Exam   Vital Signs: Temp: 97.5  F (36.4  C) Temp src: Oral BP: 117/81 Pulse: 112 Heart Rate: 112 Resp: 17 SpO2: 98 % O2 Device: Nasal cannula Oxygen Delivery: 3 LPM  Weight: 197 lbs 15.57 oz  GENERAL:  The patient is a well-nourished, well-developed female.  She appears her stated age and is alert, conversing appropriately, in no acute distress.   HEENT:  Pupils equal, round, AND reactive to light and accommodation.  Extraocular movements are intact.  Mucous membranes moist.   CARDIOVASCULAR:  Heart rate and rhythm regular.  No murmurs, gallops, OR " rubs.  Pulses are +2 and symmetric in bilateral upper extremities.  She has significant left lower extremity edema to the groin as compared to the right lower extremity edema.   RESPIRATORY:  Lungs are clear to auscultation bilaterally, free of rales or rhonchi.  No increased work of breathing or accessory muscle use.   ABDOMEN:  Soft, nontender, nondistended.  Positive bowel sounds throughout.   GENITOURINARY:  Quintero is in place and draining clear yellow urine.   INTEGUMENTARY:  Warm and dry.  No rashes, jaundice or ecchymosis.   NEUROLOGIC:  Cranial nerves II-XII are grossly intact.  No focal motor or sensory deficits.  Gait was not assessed.     Data   Recent Labs   Lab 05/28/20  0809 05/27/20  1054   WBC 13.7* 13.0*   HGB 13.8 14.9   MCV 94 96    165   INR  --  1.11    135   POTASSIUM 3.6 3.7   CHLORIDE 110* 104   CO2 20 23   BUN 11 12   CR 0.81 1.00   ANIONGAP 7 8   NICKOLAS 9.2 9.3   * 120*   ALBUMIN  --  4.0   PROTTOTAL  --  8.3   BILITOTAL  --  1.2   ALKPHOS  --  80   ALT  --  27   AST  --  21   LIPASE  --  72*     Recent Results (from the past 24 hour(s))   Head CT w/o contrast    Narrative    CT SCAN OF THE HEAD WITHOUT CONTRAST   5/27/2020 3:56 PM     HISTORY: balance problems, h/o hydrocephalus with  shunt    TECHNIQUE:  Axial images of the head and coronal reformations without  IV contrast material. Radiation dose for this scan was reduced using  automated exposure control, adjustment of the mA and/or kV according  to patient size, or iterative reconstruction technique.    COMPARISON: CT dated 6/29/2018.    FINDINGS:     Intracranial contents: A ventriculostomy catheter is seen entering  through the right frontal region. The tip is in the third ventricle.  The ventricles are normal in size and unchanged when compared to  6/29/2018. Calcifications are seen in the right basal ganglia  region.'S is also present on the previous scan and is unchanged. A  ventriculostomy tube tract is seen in  the right parietal region. This  is unchanged. There is no evidence of intracranial hemorrhage, mass,  acute infarct or anomaly.    Visualized orbits/sinuses/mastoids:  The visualized portions of the  sinuses and mastoids appear normal.    Osseous structures/soft tissues:  There is no evidence of trauma.      Impression    IMPRESSION:   1. No acute pathology. No bleed, mass, or acute infarcts. No  significant change since 6/29/2018.  2. Right frontal ventriculostomy catheter.   No hydrocephalus. No  change in the size of the ventricles since 2018.      MARQUISE JOSE MD   CT Chest (PE) Abdomen Pelvis w Contrast   Result Value    Radiologist flags Pulmonary embolism (AA)    Narrative    CT CHEST PE ABDOMEN AND PELVIS WITH CONTRAST 5/27/2020 3:57 PM    CLINICAL HISTORY: Chest pain, left leg swelling to groin, evaluate for  PE, DVT.    TECHNIQUE: CT angiogram chest and routine CT abdomen pelvis with IV  contrast. Arterial phase through the chest and venous phase through  the abdomen and pelvis. 2D and 3D MIP reconstructions were preformed  by the CT technologist. Dose reduction techniques were used.     CONTRAST: 66mL [id number]    COMPARISON: None.    FINDINGS:  ANGIOGRAM CHEST: There are bilateral small pulmonary emboli leading to  the right lower lobe, and minimally at the right middle lobe, left  upper and lower lobes. No thoracic aortic dissection identified.  Aneurysmal size of the ascending thoracic aorta measuring 4.6 cm  series 5 image 63.    LUNGS AND PLEURA: No acute airspace disease. 0.4 cm anterior right mid  chest pulmonary nodule series 7 image 124. No effusions.    MEDIASTINUM/AXILLAE: No additional acute abnormality. No adenopathy by  size.    HEPATOBILIARY: Normal.    PANCREAS: Normal.    SPLEEN: Normal.    ADRENAL GLANDS: Normal.    KIDNEYS/BLADDER: Kidneys shows no acute abnormalities. Decompressed  bladder with a Quintero catheter.    BOWEL: Normal.    LYMPH NODES: A few minimally prominent left inguinal  lymph nodes are  likely reactive.    PELVIC ORGANS: Ill-defined prominence of the cervical region that is  approximately 4.3 cm series 3 image 66. Unremarkable uterus and  adnexal regions.    OTHER: Extensive venous thrombus identified with vessel enlargement  beginning at the origin of the left common iliac vein series 13 image  39, and extending to the left external iliac vein and femoral vein.  There is also likely involvement of the left internal iliac venous  system. Some mild haziness of the fat surrounding these vessels.    MUSCULOSKELETAL: No acute abnormality. Scoliotic curve of the spine.      Impression    IMPRESSION:  1.  Multiple small bilateral pulmonary emboli are present.  2.  Extensive venous thrombus extending from the origin of the left  common iliac vein and involving the internal and external iliac veins  and at least to the left femoral vein. See ultrasound of the left leg  from today for further details.  3.  Indeterminant enlargement at the cervical region. Correlate with  physical exam to assess for any cervical abnormality.  4.  Incidental right-sided pulmonary nodule, see below for follow up.  5.  Aneurysmal size of the ascending thoracic aorta that is 4.6 cm  without associated acute abnormality.    Recommendations for one or multiple incidental lung nodules < 6mm :    Low risk patients: No routine follow-up.    High risk patients: Optional follow-up CT at 12 months; if  unchanged, no further follow-up.    *Low Risk: Minimal or absent history of smoking or other known risk  factors.  *Nonsolid (ground glass) or partly solid nodules may require longer  follow-up to exclude indolent adenocarcinoma.  *Recommendations based on Guidelines for the Management of Incidental  Pulmonary Nodules Detected at CT: From the Fleischner Society 2017,  Radiology 2017.      [Critical Result: Pulmonary embolism]    Finding was identified on 5/27/2020 4:00 PM.     Dr. Saucedo was contacted by me on 5/27/2020  4:09 PM and verbalized  understanding of the critical result.     DMITRY BAH MD     Medications     heparin       HEParin 1,200 Units/hr (05/28/20 0920)     - MEDICATION INSTRUCTIONS -         sodium chloride 0.9%  500 mL Intravenous Once     citalopram  20 mg Oral Daily     sodium chloride (PF)  3 mL Intracatheter Q8H

## 2020-05-28 NOTE — PRE-PROCEDURE
GENERAL PRE-PROCEDURE:   Procedure:  Left leg venogram with possible suction thrombectomy, alteplase use, venoplasty and/or stent placement. Possible inferior vena cava filter placement with intravenous moderate sedation   Date/Time:  5/28/2020 10:37 AM    Written consent obtained?: Yes    Risks and benefits: Risks, benefits and alternatives were discussed    Consent given by:  Patient  Patient states understanding of procedure being performed: Yes    Patient's understanding of procedure matches consent: Yes    Procedure consent matches procedure scheduled: Yes    Expected level of sedation:  Moderate  Appropriately NPO:  Yes  ASA Class:  Class 3- Severe systemic disease, definite functional limitations  Mallampati  :  Grade 2- soft palate, base of uvula, tonsillar pillars, and portion of posterior pharyngeal wall visible  Lungs:  Lungs clear with good breath sounds bilaterally and other (comment)  Lung exam comment:  Decreased in bases bilaterally  Heart:  Normal heart sounds and rate  History & Physical reviewed:  History and physical reviewed and no updates needed  Statement of review:  I have reviewed the lab findings, diagnostic data, medications, and the plan for sedation

## 2020-05-28 NOTE — PROGRESS NOTES
SPIRITUAL HEALTH SERVICES  SPIRITUAL ASSESSMENT Progress Note  [facility]     REFERRAL SOURCE: Routine Consult Request    Reviewed documentation. Reflective conversation shared with Jessica which integrated elements of illness and family narratives.     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 She names Confucianist, bible study, prayer, and music as significant ways that she connects with God, her jamir, and her community.     She has been in contact with her , daughter, sister, and friends during her time in the hospital. She appreciated having a  visit and prayer, which I offered, saying the Lord's Prayer together at the end of our visit.    Per Jessica's support, I attempted to contact her , Bradly, by phone this afternoon, but there was no answer at this time.     PLAN: Patient is aware that [facility] is available for support and is aware of how to contact us.    Rahel Santana  Associate   Pager [phone]  Phone [phone]

## 2020-05-28 NOTE — PROCEDURES
[facility]    Procedure: LLE venogram with mechanical thrombectomy.    Date/Time: 5/28/2020 1:51 PM  Performed by: Estefany Dumont DO  Authorized by: Estefany Dumont DO     UNIVERSAL PROTOCOL   Site Marked: NA  Prior Images Obtained and Reviewed:  Yes  Required items: Required blood products, implants, devices and special equipment available    Patient identity confirmed:  Verbally with patient, arm band, provided demographic data and hospital-assigned identification number  Patient was reevaluated immediately before administering moderate or deep sedation or anesthesia  Confirmation Checklist:  Patient's identity using two indicators, relevant allergies, procedure was appropriate and matched the consent or emergent situation and correct equipment/implants were available  Time out: Immediately prior to the procedure a time out was called    Universal Protocol: the Joint Commission Universal Protocol was followed    Preparation: Patient was prepped and draped in usual sterile fashion    ESBL (mL):  495         ANESTHESIA    Anesthesia: Local infiltration  Local Anesthetic:  Lidocaine 1% without epinephrine      SEDATION    Patient Sedated: Yes    Sedation Type:  Moderate (conscious) sedation  Vital signs: Vital signs monitored during sedation    See dictated procedure note for full details.  Findings: Significant DVT throughout the left external iliac vein, common iliac vein and the IVC just above the confluence. Mechanical thrombectomy was performed with no residual DVT throughout the LLE or IVC.    Specimens: none    Complications: None    Condition: Stable    Plan: No IVC filter was placed as there was no residual DVT from the popliteal vein through the IVC.     Follow up with Dr. Dumont in clinic in 1 month.     PROCEDURE   Patient Tolerance:  Patient tolerated the procedure well with no immediate complications    Length of time physician/provider present for 1:1  monitoring during sedation: 150

## 2020-05-28 NOTE — SEDATION DOCUMENTATION
Patient to IR 1 for scheduled venogram of LLE with possible intervention. Name, allergies, labs verified. Patient consented for procedure and pre sedation assessment completed. Patient transferred to table into prone position. Monitors applied and VS obtained. Resp regular and non labored on RA. O2 per NC @ 3 LPM applied for procedure. Moderated sedation initiated.

## 2020-05-28 NOTE — PLAN OF CARE
Shift pt arrival from [facility] to [facility] to 1900: VSS. Neuro: A/O x 4; follows. Pulm: Lungs: clear in upper lobes, diminished in bases throughout, RA. GI/: BS present, no BM. Quintero in place. Diet: Regular. Denies pain. Access: PIV x 2. Hep gtt.     Plan: NPO @midnight.

## 2020-05-28 NOTE — RESULT ENCOUNTER NOTE
Final urine culture report is NEGATIVE per [place] [facility] Lab Result protocol.    If NEGATIVE result, no change in treatment, per [place] [facility] Lab Result protocol.

## 2020-05-28 NOTE — CONSULTS
[facility]  Vascular Medicine Consultation         Fco Schreiber MD, VERNE[place], [facility]    Jessica Qiu MRN# [id number]   YOB: 1950 Age: 69 year old      Date of Admission:  5/27/2020  Date of Consult: 5/28/2020         Assessment and Plan:     1.  Extensive acute occlusive thrombus extending from the distal IVC into the left common iliac, external iliac veins, common femoral vein, superficial femoral vein, popliteal vein and into the posterior tibial vein up to the level of the ankle    S/p successful mechanical thrombectomy of the left external iliac vein, common iliac vein and IVC just above the confluence and no IVC filter placed.  There was no residual clot from popliteal vein through the IVC after the procedure  5/28/2020    2.  Bilateral multiple small pulmonary emboli    Reviewed venogram and discussed with Dr. Dumont today    She was seen and evaluated after the procedure today.  Looks comfortable.  Leg swelling improved.    At present our recommendations,    On IV heparin, continue and maintain high intensity protocol  Follow post venogram protocol, monitor bilateral popliteal access site  Elevate leg with blue wedge leg elevator pillow  Compression stockings thigh-high 20 to 30 mmHg will be prescribed at the time of discharge  Use Ace bandage while in the hospital up to thigh-high  Will ask pharmacy tech to run the cost of DOACS and decide tomorrow for initiation  As far as the duration of the anticoagulation concerned full anticoagulation for minimum 6 months then followed by low-dose DOAC if tolerates  Will not pursue any hypercoagulable studies except will get factor II and factor V Leyden mutation given family history of father with DVTs  (A GRETTA testing, other hypercoagulable tests will be inaccurate since she is on heparin and underwent mechanical thrombectomy etc.)  Age-appropriate cancer screening as an outpatient  Monitor hemoglobin and renal function  She  will need lower extremity venous duplex in 1 month and then follow-up with IR Dr. Dumont         3.  History of Idiopathic normal pressure hydrocephalus.  (This was diagnosed in 2016, and she previously underwent  shunt placement in 03/2016 at the [facility].  She was last seen by Neurosurgery at the [facility] in the spring of 2019 and had plans to establish with a neurosurgeon locally)    4.  Dilatation of the ascending aorta (last echocardiogram April 2018,  4.6 cm size)    She will need yearly echocardiogram follow-up, at some point dedicated CTA of chest for accurate measurements    5.  Dyslipidemia:  She is not taking any statins listed as a myalgias for simvastatin and other statins.  Fasting lipids in a.m.  We will get lipid panel  May consider in the future pitavastatin or Lescol (these are better tolerated statins)    6.  History of nephrolithiasis ( history of staghorn calculi and is on uric acid for staghorn calculi and is on chronic treatment with allopurinol)    7.  Urinary retention ?  Currently Quintero catheter in place  management per hospitalist service    This note was dictated by utilizing Dragon software    Thank you for the consultation !      Vascular medicine service will follow with you    Copy of this dictation to hospitalist service, primary care physician, Dr. Tim Schreiber MD, [facility], [facility]  Vascular medicine service  Clinical hypertension specialist  Clinical Lipidologist         Code Status:   Full Code         Primary Care Physician:   Burke Garcia [phone]         Requesting Physician:      Ann Adam MD         Chief Complaint:   Evaluation and management of extensive left lower extremity DVT, caval thrombus and bilateral small PE.    History is obtained from the patient, reviewed Epic          History of Present Illness:   Jessica Qiu is a 69 year old female with past medical history of idiopathic normal pressure hydrocephalus  diagnosed in 2016 and underwent  shunt placement at [facility] and currently following up with neurosurgery at [facility] in the process of establishing neurosurgeon locally, dyslipidemia, nephrolithiasis and hyperuricemia currently on allopurinol, generalized anxiety disorder on Celexa, history of dilated ascending aorta 4.6 cm in April 2018 on echo initially presented to the [facility] yesterday with constellation of symptoms including the gait problems, ongoing shortness of breath, urinary retention for the last 4 days.  She underwent extensive work-up and revealed bilateral pulmonary emboli and also extensive left lower extremity deep venous thrombosis including distal caval thrombus.  She was initiated IV heparin since yesterday and this morning she underwent venogram with successful mechanical thrombectomy and completely   cleared all the clot from popliteal vein to caval area.   She is hemodynamically stable  No personal history of malignancy.  No previous history of hypercoagulable status.  She is non-smoker (quit smoking in 1980)  Patient's father had a history of colon cancer and also DVT who was on warfarin for a while and she does not know the details.      I was asked to evaluate and manage extensive DVT and PE    Reviewed imaging studies, laboratory data in the epic and reviewed venogram .           Past Medical History:     Past Medical History:   Diagnosis Date     Cognitive decline 12/4/2014     Gait disorder 12/4/2014     Nephrolithiasis 12/4/2014               Past Surgical History:     Past Surgical History:   Procedure Laterality Date     BREAST SURGERY      breast biopsy     GENITOURINARY SURGERY      kidney stones     LITHOTRIPSY  2010    uric acid stone     TONSILLECTOMY      childhood              Home Medications:     Prior to Admission medications    Medication Sig Last Dose Taking? Auth Provider   citalopram (CELEXA) 20 MG tablet TAKE 1 TABLET BY MOUTH ONE TIME  DAILY  Patient taking differently: Take 20 mg by mouth every morning  5/26/2020 at am Yes Charlene Huynh MD   allopurinol (ZYLOPRIM) 300 MG tablet Take 1 tablet (300 mg) by mouth daily  Patient taking differently: Take 300 mg by mouth daily as needed  More than a month at am  Brodie Majano PA-C   azelastine (OPTIVAR) 0.05 % SOLN ophthalmic solution Apply 1 drop to eye 2 times daily  Patient taking differently: Apply 1 drop to eye 2 times daily as needed (allergies)  prn  Brodie Majano PA-C   diphenhydrAMINE (BENADRYL) 25 MG tablet Take 25 mg by mouth 2 times daily as needed for allergies  prn  Unknown, Entered By History   fluticasone (FLONASE) 50 MCG/ACT spray Spray 1-2 sprays into both nostrils daily  Patient taking differently: Spray 1-2 sprays into both nostrils daily as needed  prn  Brodie Majano PA-C            Current Medications:           citalopram  20 mg Oral Daily     sodium chloride (PF)  3 mL Intracatheter Q8H     acetaminophen, bisacodyl, lidocaine 4%, lidocaine (buffered or not buffered), melatonin, naloxone, ondansetron **OR** ondansetron, - MEDICATION INSTRUCTIONS -, polyethylene glycol, prochlorperazine **OR** prochlorperazine **OR** prochlorperazine, senna-docusate **OR** senna-docusate, sodium chloride (PF)         Allergies:     Allergies   Allergen Reactions     Simvastatin Muscle Pain (Myalgia)     extreme muscle and joint pains     Hmg-Coa-R Inhibitors      Muscle pain and stiffness     Pollen Extract      Pt. Has hayfever            Social History:   Jessica Qiu  reports that she has never smoked. She has never used smokeless tobacco. She reports current alcohol use. She reports that she does not use drugs.            Family History:     Family History   Problem Relation Age of Onset     Cerebrovascular Disease Mother 38        brain aneurysm     Cancer - colorectal Father 82               Review of Systems:   The 10 point Review of Systems is  negative other than noted in the HPI.             Physical Exam:    , Blood pressure 116/72, pulse 93, temperature 97.5  F (36.4  C), temperature source Oral, resp. rate 16, weight 89.8 kg (197 lb 15.6 oz), SpO2 95 %, not currently breastfeeding.  197 lbs 15.57 oz    Constitutional:  Negative   Psych:  Normal affect and mood answering appropriately   Neuro:  Alert awake oriented x3   Eyes:  PERRLA, EOMI   ENT:  Negative   Lymph:  No palpable lymphadenopathy   Cardiovascular:  RRR no murmurs no gallop no rub   Lungs:  Clear to auscultation   Abdomen:  Obese, soft positive bowel sounds   Skin:  NAD and Access site looks good   Musculoskeletal:  Left lower extremity  larger than right side but well perfused and no signs of phlegmasia palpable pulses bilateral lower extremities and symmetrical  Motor or sensory function is normal (evaluated after venogram and mechanical thrombectomy)   Other:           Data:   All new lab and imaging data was reviewed.  Results for orders placed or performed during the hospital encounter of 05/27/20   Heparin 10a Level     Status: None   Result Value Ref Range    Heparin 10A Level 0.60 IU/mL   Basic metabolic panel     Status: Abnormal   Result Value Ref Range    Sodium 137 133 - 144 mmol/L    Potassium 3.6 3.4 - 5.3 mmol/L    Chloride 110 (H) 94 - 109 mmol/L    Carbon Dioxide 20 20 - 32 mmol/L    Anion Gap 7 3 - 14 mmol/L    Glucose 107 (H) 70 - 99 mg/dL    Urea Nitrogen 11 7 - 30 mg/dL    Creatinine 0.81 0.52 - 1.04 mg/dL    GFR Estimate 73 >60 mL/min/[1.73_m2]    GFR Estimate If Black 85 >60 mL/min/[1.73_m2]    Calcium 9.2 8.5 - 10.1 mg/dL   CBC with platelets     Status: Abnormal   Result Value Ref Range    WBC 13.7 (H) 4.0 - 11.0 10e9/L    RBC Count 4.50 3.8 - 5.2 10e12/L    Hemoglobin 13.8 11.7 - 15.7 g/dL    Hematocrit 42.5 35.0 - 47.0 %    MCV 94 78 - 100 fl    MCH 30.7 26.5 - 33.0 pg    MCHC 32.5 31.5 - 36.5 g/dL    RDW 14.1 10.0 - 15.0 %    Platelet Count 159 150 - 450 10e9/L    Heparin Xa (10a) Level     Status: None   Result Value Ref Range    Heparin 10A Level 0.57 IU/mL        CT CHEST PE ABDOMEN AND PELVIS WITH CONTRAST 5/27/2020 3:57 PM     CLINICAL HISTORY: Chest pain, left leg swelling to groin, evaluate for  PE, DVT.     TECHNIQUE: CT angiogram chest and routine CT abdomen pelvis with IV  contrast. Arterial phase through the chest and venous phase through  the abdomen and pelvis. 2D and 3D MIP reconstructions were preformed  by the CT technologist. Dose reduction techniques were used.      CONTRAST: 66mL [id number]     COMPARISON: None.     FINDINGS:  ANGIOGRAM CHEST: There are bilateral small pulmonary emboli leading to  the right lower lobe, and minimally at the right middle lobe, left  upper and lower lobes. No thoracic aortic dissection identified.  Aneurysmal size of the ascending thoracic aorta measuring 4.6 cm  series 5 image 63.     LUNGS AND PLEURA: No acute airspace disease. 0.4 cm anterior right mid  chest pulmonary nodule series 7 image 124. No effusions.     MEDIASTINUM/AXILLAE: No additional acute abnormality. No adenopathy by  size.     HEPATOBILIARY: Normal.     PANCREAS: Normal.     SPLEEN: Normal.     ADRENAL GLANDS: Normal.     KIDNEYS/BLADDER: Kidneys shows no acute abnormalities. Decompressed  bladder with a Quintero catheter.     BOWEL: Normal.     LYMPH NODES: A few minimally prominent left inguinal lymph nodes are  likely reactive.     PELVIC ORGANS: Ill-defined prominence of the cervical region that is  approximately 4.3 cm series 3 image 66. Unremarkable uterus and  adnexal regions.     OTHER: Extensive venous thrombus identified with vessel enlargement  beginning at the origin of the left common iliac vein series 13 image  39, and extending to the left external iliac vein and femoral vein.  There is also likely involvement of the left internal iliac venous  system. Some mild haziness of the fat surrounding these vessels.     MUSCULOSKELETAL: No acute  abnormality. Scoliotic curve of the spine.                                                                      IMPRESSION:  1.  Multiple small bilateral pulmonary emboli are present.  2.  Extensive venous thrombus extending from the origin of the left  common iliac vein and involving the internal and external iliac veins  and at least to the left femoral vein. See ultrasound of the left leg  from today for further details.  3.  Indeterminant enlargement at the cervical region. Correlate with  physical exam to assess for any cervical abnormality.  4.  Incidental right-sided pulmonary nodule, see below for follow up.  5.  Aneurysmal size of the ascending thoracic aorta that is 4.6 cm  without associated acute abnormality.     Recommendations for one or multiple incidental lung nodules < 6mm :    Low risk patients: No routine follow-up.    High risk patients: Optional follow-up CT at 12 months; if  unchanged, no further follow-up.     *Low Risk: Minimal or absent history of smoking or other known risk  factors.  *Nonsolid (ground glass) or partly solid nodules may require longer  follow-up to exclude indolent adenocarcinoma.  *Recommendations based on Guidelines for the Management of Incidental  Pulmonary Nodules Detected at CT: From the Fleischner Society 2017,  Radiology 2017.        [Critical Result: Pulmonary embolism]     Finding was identified on 5/27/2020 4:00 PM.      Dr. Saucedo was contacted by me on 5/27/2020 4:09 PM and verbalized  understanding of the critical result.      DMITRY BAH MD      CT SCAN OF THE HEAD WITHOUT CONTRAST   5/27/2020 3:56 PM      HISTORY: balance problems, h/o hydrocephalus with  shunt     TECHNIQUE:  Axial images of the head and coronal reformations without  IV contrast material. Radiation dose for this scan was reduced using  automated exposure control, adjustment of the mA and/or kV according  to patient size, or iterative reconstruction technique.     COMPARISON: CT dated  6/29/2018.     FINDINGS:      Intracranial contents: A ventriculostomy catheter is seen entering  through the right frontal region. The tip is in the third ventricle.  The ventricles are normal in size and unchanged when compared to  6/29/2018. Calcifications are seen in the right basal ganglia  region.'S is also present on the previous scan and is unchanged. A  ventriculostomy tube tract is seen in the right parietal region. This  is unchanged. There is no evidence of intracranial hemorrhage, mass,  acute infarct or anomaly.     Visualized orbits/sinuses/mastoids:  The visualized portions of the  sinuses and mastoids appear normal.     Osseous structures/soft tissues:  There is no evidence of trauma.                                                                      IMPRESSION:   1. No acute pathology. No bleed, mass, or acute infarcts. No  significant change since 6/29/2018.  2. Right frontal ventriculostomy catheter.   No hydrocephalus. No  change in the size of the ventricles since 2018.        MARQUISE JOSE MD    VENOUS ULTRASOUND LEFT LEG  5/27/2020 12:54 PM      HISTORY: Leg swelling.     COMPARISON: None.     FINDINGS: Examination of the deep veins with graded compression and  color flow Doppler with spectral wave form analysis was performed.  There is extensive occlusive thrombus identified extending from the  distal IVC into the left common iliac and external iliac veins, into  the left common femoral vein, superficial femoral vein, popliteal vein  and into the posterior tibial veins to the level of the ankle. There  is also superficial venous thrombus in the left greater saphenous vein  to the mid thigh from the saphenofemoral junction.                                                                      IMPRESSION: Extensive left lower extremity and caval deep venous  thrombus.     BESSIE BARRON MD

## 2020-05-28 NOTE — PROGRESS NOTES
Spoke with patient's daughter, Elizabeth. Daughter states that she would like updates after the planned procedure today and what the plan of care is. Daughter's number in Sticky note.

## 2020-05-28 NOTE — SEDATION DOCUMENTATION
Procedure completed. Venogram and mechanical thrombectomy to IVC and LLE. Patient tolerated procedure and sedation. Sheaths pulled and manual pressure held to sites.  ml. Patient tachycardic throughout most of procedure. VS otherwise baseline, refer to flowsheet. Report called to RN and patient returned to station 33.

## 2020-05-28 NOTE — PLAN OF CARE
A/O x4. VSS on RA. Tele NSR. Hep gtt infusing at 12 unit(s)/hr, recheck in AM. LLE red, warm and swollen. Quintero in place with adequate output. BS+, flatus+. LS clear/diminished in bases. Up A x1 w/ GB. NPO at midnight for thrombectomy today. Continue to monitor.

## 2020-05-28 NOTE — PLAN OF CARE
[id number] Pt here with LLE DVT. A&Ox4. Neuros baseline neuropathy to bilateral feet, LLE 3+ edema, & red with decreased ROM. VSS. Tele NSR. Regular diet, NPO at MN. Up with A1, bedrest since transfer. Denies pain. Quintero patent. Heparin gtt running at 12 mL/hr, 10A recheck [id number].  Plan for thrombectomy tomorrow.

## 2020-05-29 ENCOUNTER — APPOINTMENT (OUTPATIENT)
Dept: PHYSICAL THERAPY | Facility: CLINIC | Age: 70
DRG: 270 | End: 2020-05-29
Attending: INTERNAL MEDICINE
Payer: COMMERCIAL

## 2020-05-29 ENCOUNTER — APPOINTMENT (OUTPATIENT)
Dept: ULTRASOUND IMAGING | Facility: CLINIC | Age: 70
DRG: 270 | End: 2020-05-29
Attending: HOSPITALIST
Payer: COMMERCIAL

## 2020-05-29 LAB
ALBUMIN UR-MCNC: 10 MG/DL
ANION GAP SERPL CALCULATED.3IONS-SCNC: 6 MMOL/L (ref 3–14)
APPEARANCE UR: ABNORMAL
BILIRUB UR QL STRIP: NEGATIVE
BUN SERPL-MCNC: 16 MG/DL (ref 7–30)
CALCIUM SERPL-MCNC: 8.3 MG/DL (ref 8.5–10.1)
CHLORIDE SERPL-SCNC: 107 MMOL/L (ref 94–109)
CHOLEST SERPL-MCNC: 199 MG/DL
CO2 SERPL-SCNC: 27 MMOL/L (ref 20–32)
COLOR UR AUTO: YELLOW
CREAT SERPL-MCNC: 0.94 MG/DL (ref 0.52–1.04)
GFR SERPL CREATININE-BSD FRML MDRD: 61 ML/MIN/{1.73_M2}
GLUCOSE SERPL-MCNC: 100 MG/DL (ref 70–99)
GLUCOSE UR STRIP-MCNC: NEGATIVE MG/DL
HDLC SERPL-MCNC: 55 MG/DL
HGB UR QL STRIP: ABNORMAL
KETONES UR STRIP-MCNC: NEGATIVE MG/DL
LACTATE BLD-SCNC: 1.1 MMOL/L (ref 0.7–2)
LDLC SERPL CALC-MCNC: 105 MG/DL
LEUKOCYTE ESTERASE UR QL STRIP: ABNORMAL
LMWH PPP CHRO-ACNC: 0.68 IU/ML
MUCOUS THREADS #/AREA URNS LPF: PRESENT /LPF
NITRATE UR QL: NEGATIVE
NONHDLC SERPL-MCNC: 144 MG/DL
PH UR STRIP: 5.5 PH (ref 5–7)
POTASSIUM SERPL-SCNC: 3.3 MMOL/L (ref 3.4–5.3)
RBC #/AREA URNS AUTO: 181 /HPF (ref 0–2)
SODIUM SERPL-SCNC: 140 MMOL/L (ref 133–144)
SOURCE: ABNORMAL
SP GR UR STRIP: 1.02 (ref 1–1.03)
SQUAMOUS #/AREA URNS AUTO: <1 /HPF (ref 0–1)
TRIGL SERPL-MCNC: 197 MG/DL
UROBILINOGEN UR STRIP-MCNC: NORMAL MG/DL (ref 0–2)
WBC #/AREA URNS AUTO: 12 /HPF (ref 0–5)

## 2020-05-29 PROCEDURE — 36415 COLL VENOUS BLD VENIPUNCTURE: CPT | Performed by: INTERNAL MEDICINE

## 2020-05-29 PROCEDURE — 25800030 ZZH RX IP 258 OP 636: Performed by: HOSPITALIST

## 2020-05-29 PROCEDURE — 99356 ZZC PROLONGED SERV,INPATIENT,1ST HR: CPT | Performed by: INTERNAL MEDICINE

## 2020-05-29 PROCEDURE — 97161 PT EVAL LOW COMPLEX 20 MIN: CPT | Mod: GP

## 2020-05-29 PROCEDURE — 97116 GAIT TRAINING THERAPY: CPT | Mod: GP

## 2020-05-29 PROCEDURE — 25000128 H RX IP 250 OP 636: Performed by: HOSPITALIST

## 2020-05-29 PROCEDURE — 97110 THERAPEUTIC EXERCISES: CPT | Mod: GP

## 2020-05-29 PROCEDURE — 99232 SBSQ HOSP IP/OBS MODERATE 35: CPT | Performed by: HOSPITALIST

## 2020-05-29 PROCEDURE — 80061 LIPID PANEL: CPT | Performed by: INTERNAL MEDICINE

## 2020-05-29 PROCEDURE — 83605 ASSAY OF LACTIC ACID: CPT | Performed by: HOSPITALIST

## 2020-05-29 PROCEDURE — 97530 THERAPEUTIC ACTIVITIES: CPT | Mod: GP

## 2020-05-29 PROCEDURE — 85520 HEPARIN ASSAY: CPT | Performed by: INTERNAL MEDICINE

## 2020-05-29 PROCEDURE — 25000128 H RX IP 250 OP 636: Performed by: INTERNAL MEDICINE

## 2020-05-29 PROCEDURE — 81241 F5 GENE: CPT | Performed by: INTERNAL MEDICINE

## 2020-05-29 PROCEDURE — 25000132 ZZH RX MED GY IP 250 OP 250 PS 637: Performed by: INTERNAL MEDICINE

## 2020-05-29 PROCEDURE — 80048 BASIC METABOLIC PNL TOTAL CA: CPT | Performed by: HOSPITALIST

## 2020-05-29 PROCEDURE — 76770 US EXAM ABDO BACK WALL COMP: CPT

## 2020-05-29 PROCEDURE — 81240 F2 GENE: CPT | Performed by: INTERNAL MEDICINE

## 2020-05-29 PROCEDURE — 36415 COLL VENOUS BLD VENIPUNCTURE: CPT | Performed by: HOSPITALIST

## 2020-05-29 PROCEDURE — 81001 URINALYSIS AUTO W/SCOPE: CPT | Performed by: HOSPITALIST

## 2020-05-29 PROCEDURE — 12000000 ZZH R&B MED SURG/OB

## 2020-05-29 PROCEDURE — 99233 SBSQ HOSP IP/OBS HIGH 50: CPT | Performed by: INTERNAL MEDICINE

## 2020-05-29 PROCEDURE — 87086 URINE CULTURE/COLONY COUNT: CPT | Performed by: INTERNAL MEDICINE

## 2020-05-29 RX ORDER — CITALOPRAM HYDROBROMIDE 20 MG/1
20 TABLET ORAL EVERY MORNING
Status: DISCONTINUED | OUTPATIENT
Start: 2020-05-30 | End: 2020-05-29

## 2020-05-29 RX ADMIN — SODIUM CHLORIDE 1000 ML: 9 INJECTION, SOLUTION INTRAVENOUS at 12:50

## 2020-05-29 RX ADMIN — HEPARIN SODIUM 1200 UNITS/HR: 10000 INJECTION, SOLUTION INTRAVENOUS at 02:20

## 2020-05-29 RX ADMIN — ACETAMINOPHEN 650 MG: 325 TABLET, FILM COATED ORAL at 12:09

## 2020-05-29 RX ADMIN — HEPARIN SODIUM 1200 UNITS/HR: 10000 INJECTION, SOLUTION INTRAVENOUS at 22:45

## 2020-05-29 RX ADMIN — CITALOPRAM HYDROBROMIDE 20 MG: 20 TABLET ORAL at 09:20

## 2020-05-29 ASSESSMENT — ACTIVITIES OF DAILY LIVING (ADL)
ADLS_ACUITY_SCORE: 15
ADLS_ACUITY_SCORE: 15
ADLS_ACUITY_SCORE: 14
ADLS_ACUITY_SCORE: 15
ADLS_ACUITY_SCORE: 14
ADLS_ACUITY_SCORE: 15

## 2020-05-29 NOTE — PROVIDER NOTIFICATION
AmCom to Dr. Lackey:    RENE Qiu ([id number]):    275mL urine via FC; 170 scanned. 1L bolus ended @ 1430. New orders?    Did page Dr. Lofton @ [id number]; no response.    Bryce ENCISO RN [place]. 33        AmCom to Dr. Lofton:    RENE Qiu (3-315-01):    125mL urine via FC; 170 scanned. 1L bolus ended @ 1430. New orders?    Bryce ENCISO RN [place]. 33

## 2020-05-29 NOTE — PLAN OF CARE
Vital Signs stable, pt on room air. Telemetry normal sinus rhythm. Alert and Oriented, forgetful at times. Lung sounds clear throughout. Bowel sounds hypoactive. Passing flatus. Regular diet, good appetite. Denies nausea. Adequate urinary output via renner catheter, urine had small amount of sediments in it. Pt arrived from IR at 1415, post thrombectomy, CMS intact ex left leg edema +2 (improved during shift), baseline numbness and tingling present. Pulses palpable. Dressing to back of bilateral knees clean dry and intact, site is soft and non-tender. Up with assist of 1 and a gait belt. Pt was off bedrest at 2000 and then ambulated in the halls. Pt has denied pain. Heparin infusing, will continue to monitor.

## 2020-05-29 NOTE — PLAN OF CARE
Discharge Planner PT   Patient plan for discharge: Home  Current status: Pt is a 69 year old female with history of hydrocephalus and shunt, admitted with DVT. At baseline, pt [place] with her  [place], no accessibility concerns. Pt reports use 4ww for community ambulation and no AD inside apartment.  This date, pt requires SBA for sit to/from stand transfers. Pt ambulates 125' x 2 with FWW and CGA progressing to SBA.   Barriers to return to prior living situation: Fall history  Recommendations for discharge: Home with 4ww for all ambulation, home health PT  Rationale for recommendations: Pt is below baseline and would benefit from home health PT to increase endurance and mobility and perform a home safety evaluation. Pt would benefit from use of 4ww for all mobility inside home and out in the community, patient verbalizes understanding.       Entered by: Brooke Veloz 05/29/2020 3:36 PM

## 2020-05-29 NOTE — PROGRESS NOTES
Interventional radiology:  Here to assess right popliteal puncture site.  Patient has had intermittent bleeding from the site.  RN changed dressing at 8 am.  Manual pressure applied to the site.  Applied compression to site for 1 hour.  Returned to check on patient after 1 hour.  Removed old dressing, glue not intact, removed old glue and placed new glue on site.  Slight ooze with removal of glue.  Applied quick clot to site, tegaderm and ace bandage for compression.  Will keep patient on bedrest for 1 hour.  Instructed RN to remove ace at 1 hour, check site and then re wrap.  Would recommend compression with ace until patient is discharged to home if patient tolerates.  Fabienne Holden RN  IR nurse clinician  [phone]

## 2020-05-29 NOTE — PHARMACY-RX INSURANCE COVERAGE
Anticoagulation outpatient coverage check.   Patient has Medicare D with an unmet deductible.    Xarelto/Eliquis  May:  Upon receipt of RX, Discharge Pharmacy can dispense 1 month free.  Aziza: $270 (fulfills $225 deductible)  July-Dec: $45/mo    Marlena Beasley CphT  [facility]  Discharge Pharmacy Liaison  Liaison Cell: [phone]

## 2020-05-29 NOTE — PROGRESS NOTES
[facility]    Medicine Progress Note - Hospitalist Service       Date of Admission:  5/27/2020  Assessment & Plan    Rahel Qiu is a 69-year-old female with past medical history of idiopathic and normal pressure hydrocephalus, status post ventriculoperitoneal () shunt placement in 2016, history of nephrolithiasis, anxiety and dyslipidemia, who presents to the Emergency Room today for constellation of symptoms including urinary retention, changes in her gait and shortness of breath.  She was ultimately found to have an extensive left lower extremity deep venous thrombosis (DVT) and  bilateral pulmonary emboli.  She was transferred to [facility] and admitted for ongoing evaluation and care.     Extensive left lower extremity occlusive DVT and multiple bilateral pulmonary emboli.   - s/p thrombectomy 5/28. No IVC filter placed as thrombus was completely removed.  - continue heparin overnight per vasc surg. End at 9am tomorrow with start of DOAC.  - xarelto ordered to start tomorrow morning at 9am    Urinary retention  Poor urine output  - continue renner for now. 1L NS bolus and f/u output  - renal US    History of nephrolithiasis.  She has a history of staghorn calculi and is on uric acid for staghorn calculi and is on chronic treatment with allopurinol.  This will be held for now.  Can resume following thrombectomy.     Anxiety.  Chronic and stable on Celexa.  This can be continued.     Idiopathic normal pressure hydrocephalus, status post  shunt placement in 2016.  Stable.  Previously followed at [facility] and was last seen there in 2019.  No concerns with shunt malfunction on assessment this evening.        Diet: Regular Diet Adult    DVT Prophylaxis: heparin gtt  Renner Catheter: in place, indication: Retention  Code Status: Full Code           Disposition Plan   Expected discharge: Tomorrow, recommended to prior living arrangement once anticoagulation plan  established.  Entered: Cheyenne Lofton MD 05/29/2020, 12:15 PM       The patient's care was discussed with the Bedside Nurse and Patient.    Cheyenne Lofton MD  Hospitalist Service  [facility]    ______________________________________________________________________    Interval History   Patient comfortable. Says legs are sore but no pain. Eating, drinking. Very poor UOP per nursing.    Data reviewed today: I reviewed all medications, new labs and imaging results over the last 24 hours. I personally reviewed no images or EKG's today.    Physical Exam   Vital Signs: Temp: 98.1  F (36.7  C) Temp src: Oral BP: 103/61 Pulse: 94 Heart Rate: 83 Resp: 16 SpO2: 94 % O2 Device: None (Room air) Oxygen Delivery: 2 LPM  Weight: 197 lbs 1.46 oz  GENERAL:  The patient is a well-nourished, well-developed female.  She appears her stated age and is alert, conversing appropriately, in no acute distress.   HEENT:  Pupils equal, round, AND reactive to light and accommodation.  Extraocular movements are intact.  Mucous membranes moist.   CARDIOVASCULAR:  Heart rate and rhythm regular.  No murmurs, gallops, OR rubs.  Pulses are +2 and symmetric in bilateral upper extremities.  She has significant left lower extremity edema to the groin as compared to the right lower extremity edema.   RESPIRATORY:  Lungs are clear to auscultation bilaterally, free of rales or rhonchi.  No increased work of breathing or accessory muscle use.   ABDOMEN:  Soft, nontender, nondistended.  Positive bowel sounds throughout.   GENITOURINARY:  Quintero is in place and draining clear yellow urine.   INTEGUMENTARY:  Warm and dry.  No rashes, jaundice or ecchymosis.   NEUROLOGIC:  Cranial nerves II-XII are grossly intact.  No focal motor or sensory deficits.  Gait was not assessed.     Data   Recent Labs   Lab 05/28/20  0809 05/27/20  1054   WBC 13.7* 13.0*   HGB 13.8 14.9   MCV 94 96    165   INR  --  1.11    135   POTASSIUM 3.6 3.7    CHLORIDE 110* 104   CO2 20 23   BUN 11 12   CR 0.81 1.00   ANIONGAP 7 8   NICKOLAS 9.2 9.3   * 120*   ALBUMIN  --  4.0   PROTTOTAL  --  8.3   BILITOTAL  --  1.2   ALKPHOS  --  80   ALT  --  27   AST  --  21   LIPASE  --  72*     No results found for this or any previous visit (from the past 24 hour(s)).  Medications     HEParin 1,200 Units/hr (05/29/20 5270)     - MEDICATION INSTRUCTIONS -         citalopram  20 mg Oral Daily     sodium chloride (PF)  3 mL Intracatheter Q8H

## 2020-05-29 NOTE — PROGRESS NOTES
Removed stitch from left popliteal puncture site.  Site without bleeding or hematoma.  Slight ecchymosis.  Quick clot and tegaderm applied to site.  Re wrapped leg as previous.  Fabienne Holden RN  IR nurse clinician  [phone]

## 2020-05-29 NOTE — PLAN OF CARE
VSS, LS clear.  CMS in tact, mild edema bilaterally.  Gentle compression with ace wrap applied to LLE and elevated .  Moderate sanguinous drainage from IR site beyond R knee.  Pressure held for 5-10 min and new dressing applied.  Denies pain.  Adequate UOP per renner.

## 2020-05-29 NOTE — PROGRESS NOTES
05/29/20 1400   Quick Adds   Type of Visit Initial PT Evaluation   Living Environment   Lives With spouse   Living Arrangements apartment   Home Accessibility no concerns   Transportation Anticipated family or friend will provide;car, drives self   Living Environment Comment Pt uses 4ww for community ambulation, no AD inside apartment.   Self-Care   Usual Activity Tolerance good   Current Activity Tolerance moderate   Regular Exercise Yes   Activity/Exercise Type strength training;walking   Exercise Amount/Frequency 3-5 times/wk   Equipment Currently Used at Home walker, rolling   Functional Level Prior   Ambulation 0-->independent   Transferring 0-->independent   Fall history within last six months yes   Number of times patient has fallen within last six months 4   Which of the above functional risks had a recent onset or change? fall history   Prior Functional Level Comment Pt reports independence with mobility prior to admit.   General Information   Onset of Illness/Injury or Date of Surgery - Date 05/27/20   Referring Physician Dr. Adam   Patient/Family Goals Statement To go home   Pertinent History of Current Problem (include personal factors and/or comorbidities that impact the POC) Pt is a 69 year old female admitted with DVT, history of hydrocephalus with shunt.   Precautions/Limitations fall precautions   Cognitive Status Examination   Orientation orientation to person, place and time   Level of Consciousness alert   Follows Commands and Answers Questions 100% of the time   Range of Motion (ROM)   ROM Quick Adds No deficits were identified   Strength   Strength Comments Gross LE strength 4/5 bilaterally   Bed Mobility   Bed Mobility Comments NT, up in chair   Transfer Skills   Transfer Comments CGA   Gait   Gait Comments 15' FWW CGA, decreased step length and karely, forward lean   Balance   Balance Comments Good in sitting, fair in standing   General Therapy Interventions   Planned Therapy  "Interventions balance training;bed mobility training;strengthening;transfer training;gait training;home program guidelines;progressive activity/exercise   Clinical Impression   Criteria for Skilled Therapeutic Intervention yes, treatment indicated   PT Diagnosis Impaired ambulation   Influenced by the following impairments Decreased strength, decreased endurance, decreased balance   Functional limitations due to impairments Diffficulty with bed mobility, transfers, ambulation   Clinical Presentation Stable/Uncomplicated   Clinical Presentation Rationale VSS, pain controlled   Clinical Decision Making (Complexity) Low complexity   Therapy Frequency Daily   Predicted Duration of Therapy Intervention (days/wks) 3 days   Anticipated Equipment Needs at Discharge walker   Anticipated Discharge Disposition Home with Home Therapy   Risk & Benefits of therapy have been explained Yes   Patient, Family & other staff in agreement with plan of care Yes   [facility] TM \"6 Clicks\"   2016, Trustees of [facility], under license to [employer].  All rights reserved.   6 Clicks Short Forms Basic Mobility Inpatient Short Form   [facility]  \"6 Clicks\" V.2 Basic Mobility Inpatient Short Form   1. Turning from your back to your side while in a flat bed without using bedrails? 3 - A Little   2. Moving from lying on your back to sitting on the side of a flat bed without using bedrails? 3 - A Little   3. Moving to and from a bed to a chair (including a wheelchair)? 3 - A Little   4. Standing up from a chair using your arms (e.g., wheelchair, or bedside chair)? 3 - A Little   5. To walk in hospital room? 3 - A Little   6. Climbing 3-5 steps with a railing? 3 - A Little   Basic Mobility Raw Score (Score out of 24.Lower scores equate to lower levels of function) 18   Total Evaluation Time   Total Evaluation Time (Minutes) 10     "

## 2020-05-29 NOTE — PLAN OF CARE
Vital Signs stable, pt on room air. Telemetry normal sinus rhythm. Alert and Oriented, Neuros Intact ex forgetful at times.Lung sounds clear throughout. Bowel sounds active and audible. Passing flatus. Regular diet, good appetite. Denies nausea.  Low urine output, Bolus ordered. Bilateral lower extremity pulses palpable, baseline neuropathy. Left lower extremity +2 edema, ACE wrap on and elevated on blue wedge. Right leg dressing saturated, see provider notification. Left leg incision suture removed, dressing remains clean dry and intact. Right and left leg sites ecchymotic but soft and non-tender. Pt ambulated with therapy in the halls.  Up with assist of 1 gait belt. Pain controlled with Tylenol.

## 2020-05-29 NOTE — PROVIDER NOTIFICATION
Behind right knee dressing became saturated, writer held pressure for 5 minutes and did a dressing change. IR called and IR nurse came to assess pt and held pressure for an additional 10 minutes.  Will continue to follow. Pt will be on bedrest for 1 hour until IR nurse comes back to reassess.

## 2020-05-29 NOTE — PROGRESS NOTES
[facility]    Vascular Medicine Progress Note    Date of Service (when I saw the patient): 05/29/2020     Assessment & Plan   Jessica Qiu is a 69 year old female who was admitted on 5/27/2020.     1.  Extensive acute occlusive thrombus extending from the distal IVC into the left common iliac, external iliac veins, common femoral vein, superficial femoral vein, popliteal vein and into the posterior tibial vein up to the level of the ankle     S/p successful mechanical thrombectomy of the left external iliac vein, common iliac vein and IVC just above the confluence and no IVC filter placed.  There was no residual clot from popliteal vein through the IVC after the procedure  5/28/2020     2.  Bilateral multiple small pulmonary emboli    Access site bleeding IR applied pressure this am  On IV heparin with good 10 A levels.     Reviewed venogram and discussed with Dr. Dumont yesterdsay       At present our recommendations,     On IV heparin, continue for today  and maintain high intensity protocol   monitor bilateral popliteal access site, apply pressure and consult IR if bleeds again  HGB 13.8  Creat normal  Elevate leg with blue wedge leg elevator pillow  Compression stockings thigh-high 20 to 30 mmHg will be prescribed at the time of discharge  Use Ace bandage while in the hospital up to thigh-high   pharmacy tech to run the cost of DOACS and decide tomorrow for initiation if pt and family agreeable  ( xarelto 15 bid for 21 days then 20 daily with supper and continue or  Apixaban 10 bid for 7 days then 5 bid and continue) please see addendum below  As far as the duration of the anticoagulation concerned full anticoagulation for minimum 6 months then followed by low-dose DOAC if tolerates  PT to eval gait, fall risk before going home, order placed  Will not pursue any hypercoagulable studies except will get factor II and factor V Leyden mutation given family history of father with DVTs  (APLA  testing, other hypercoagulable tests will be inaccurate since she is on heparin and underwent mechanical thrombectomy etc.)  Age-appropriate cancer screening as an outpatient by primary ( previous mammograms negative)  Monitor hemoglobin and renal function    She will need lower extremity venous duplex in 1 month and then follow-up with IR Dr. Dumont    Follow up with  at [facility] in 2-3 months ( [phone])    Addendum:  Discussed extensively with patient's daughter Elizabeth today.  Explained clinical situation  Explained success of mechanical thrombectomy  Discussed options of oral anticoagulation warfarin versus DOACS cost, pros and cons  Her mom has a history of falls due to hydrocephalus  She prefers and leaning towards newer medications because of convenience and no interaction.  Cost is not an issue for them  She was bleeding from the access site today requiring pressure application.  Currently on IV heparin maintaining good 10A levels  She likes the idea of physical therapist evaluating for risks and gait training etc..    Once the bleeding in good control , after PT evaluation may be tomorrow or Sunday initiate apixaban 10 mg twice a day for 7 days then followed by 5 mg twice a day.    Total patient care time spent today 65 minutes, prolonged service is due to initial evaluation then followed by lengthy discussion with patient's daughter per patient request .   Dr. Quintero from vascular medicine service will round on this patient tomorrow       3.  History of Idiopathic normal pressure hydrocephalus.  (This was diagnosed in 2016, and she previously underwent  shunt placement in 03/2016 at the [facility].  She was last seen by Neurosurgery at the [facility] in the spring of 2019 and had plans to establish with a neurosurgeon locally)     4.  Dilatation of the ascending aorta (last echocardiogram April 2018,  4.6 cm size)     She will need yearly echocardiogram follow-up, at some point dedicated  CTA of chest for accurate measurements     5.  Dyslipidemia:  She is not taking any statins listed as a myalgias for simvastatin and other statins.  Fasting lipids in a.m.  We will get lipid panel  May consider in the future pitavastatin or Lescol (these are better tolerated statins)     6.  History of nephrolithiasis ( history of staghorn calculi and is on uric acid for staghorn calculi and is on chronic treatment with allopurinol)     7.  Urinary retention ?  Currently Quintero catheter in place  management per hospitalist service     This note was dictated by utilizing Dragon software     Thank you for the consultation !        Fco Schreiber MD, [place], [facility]  Vascular Medicine    Interval History    bleeding from access site popliteal area this am.  Leg swelling better  HGb stable  Creat normal    Physical Exam   Temp: 97.9  F (36.6  C) Temp src: Oral BP: 109/70 Pulse: 94 Heart Rate: 83 Resp: 17 SpO2: 94 % O2 Device: None (Room air) Oxygen Delivery: 2 LPM  Vitals:    05/28/20 0600 05/29/20 0500   Weight: 89.8 kg (197 lb 15.6 oz) 89.4 kg (197 lb 1.5 oz)     Vital Signs with Ranges  Temp:  [97.7  F (36.5  C)-98.5  F (36.9  C)] 97.9  F (36.6  C)  Pulse:  [] 94  Heart Rate:  [] 83  Resp:  [6-21] 17  BP: (102-140)/(61-93) 109/70  SpO2:  [93 %-99 %] 94 %  I/O last 3 completed shifts:  In: 1296 [P.O.:450; I.V.:346; IV Piggyback:500]  Out: 825 [Urine:825]    Constitutional: Awake, alert, cooperative, no apparent distress, and appears stated age.  Eyes: Lids and lashes normal, pupils equal, round and reactive to light, extra ocular muscles intact, sclera clear, conjunctiva normal.  ENT: Normocephalic, without obvious abnormality  Respiratory: No increased work of breathing, good air exchange, clear to auscultation bilaterally, no crackles or wheezing.  Cardiovascular: Normal apical impulse, regular rate and rhythm, normal S1 and S2, no S3 or S4, and no murmur noted.  GI:  normal bowel sounds, soft,  non-distended, non-tender, no masses palpated, no hepatosplenomegaly.  Lymph/Hematologic: No cervical lymphadenopathy and no supraclavicular lymphadenopathy.  Genitourinary:  Quintero in place  Skin: No bruising or bleeding, normal skin color, texture, turgor, no redness, warmth, or swelling, no rashes, no lesions,  nails normal without discoloration or clubbing and no jaundice.  Musculoskeletal:improved leg swelling compared to admission  Neurologic: Awake, alert, oriented to name, place and time.  Cranial nerves II-XII are grossly intact.  Motor is 5 out of 5 bilaterally.   Neuropsychiatric: Calm, normal eye contact, alert, normal affect, oriented to self, place, time and situation, memory for past and recent events intact and thought process normal.  Pulses:  Good palpable pulses.    Medications     HEParin 1,200 Units/hr (05/29/20 0220)     - MEDICATION INSTRUCTIONS -         citalopram  20 mg Oral Daily     sodium chloride (PF)  3 mL Intracatheter Q8H       Data   Recent Labs   Lab 05/28/20  0809 05/27/20  1054   WBC 13.7* 13.0*   HGB 13.8 14.9   MCV 94 96    165   INR  --  1.11    135   POTASSIUM 3.6 3.7   CHLORIDE 110* 104   CO2 20 23   BUN 11 12   CR 0.81 1.00   ANIONGAP 7 8   NICKOLAS 9.2 9.3   * 120*   ALBUMIN  --  4.0   PROTTOTAL  --  8.3   BILITOTAL  --  1.2   ALKPHOS  --  80   ALT  --  27   AST  --  21

## 2020-05-29 NOTE — PROVIDER NOTIFICATION
Paged Dr. Lofton, sediments are present in urine.  Advised to keep in renner catheter and orders placed for a UA.

## 2020-05-29 NOTE — DISCHARGE INSTRUCTIONS
Follow up with Dr. Dumont at the [facility] in 1 month.  We will obtain an ultrasound of the left IVC and left lower extremity at that time.  The clinic will reach out to you to schedule.   If you have any questions please contact Fabienne Holden [phone]  Will follow up with patient on Monday.  Keep dressings on until that time.  I will give instruction on when to remove the dressings on Monday. Fabienne Holden RN

## 2020-05-30 ENCOUNTER — APPOINTMENT (OUTPATIENT)
Dept: PHYSICAL THERAPY | Facility: CLINIC | Age: 70
DRG: 270 | End: 2020-05-30
Attending: INTERNAL MEDICINE
Payer: COMMERCIAL

## 2020-05-30 VITALS
BODY MASS INDEX: 33.83 KG/M2 | RESPIRATION RATE: 16 BRPM | OXYGEN SATURATION: 96 % | HEART RATE: 90 BPM | WEIGHT: 197.09 LBS | DIASTOLIC BLOOD PRESSURE: 75 MMHG | SYSTOLIC BLOOD PRESSURE: 122 MMHG | TEMPERATURE: 97.5 F

## 2020-05-30 LAB
ANION GAP SERPL CALCULATED.3IONS-SCNC: 5 MMOL/L (ref 3–14)
BACTERIA SPEC CULT: NO GROWTH
BUN SERPL-MCNC: 9 MG/DL (ref 7–30)
CALCIUM SERPL-MCNC: 8.2 MG/DL (ref 8.5–10.1)
CHLORIDE SERPL-SCNC: 111 MMOL/L (ref 94–109)
CO2 SERPL-SCNC: 27 MMOL/L (ref 20–32)
CREAT SERPL-MCNC: 0.67 MG/DL (ref 0.52–1.04)
ERYTHROCYTE [DISTWIDTH] IN BLOOD BY AUTOMATED COUNT: 13.9 % (ref 10–15)
GFR SERPL CREATININE-BSD FRML MDRD: 89 ML/MIN/{1.73_M2}
GLUCOSE SERPL-MCNC: 95 MG/DL (ref 70–99)
HCT VFR BLD AUTO: 28 % (ref 35–47)
HGB BLD-MCNC: 8.9 G/DL (ref 11.7–15.7)
LMWH PPP CHRO-ACNC: 0.48 IU/ML
Lab: NORMAL
MCH RBC QN AUTO: 30.4 PG (ref 26.5–33)
MCHC RBC AUTO-ENTMCNC: 31.8 G/DL (ref 31.5–36.5)
MCV RBC AUTO: 96 FL (ref 78–100)
PLATELET # BLD AUTO: 155 10E9/L (ref 150–450)
POTASSIUM SERPL-SCNC: 3.6 MMOL/L (ref 3.4–5.3)
RBC # BLD AUTO: 2.93 10E12/L (ref 3.8–5.2)
SODIUM SERPL-SCNC: 143 MMOL/L (ref 133–144)
SPECIMEN SOURCE: NORMAL
WBC # BLD AUTO: 9.4 10E9/L (ref 4–11)

## 2020-05-30 PROCEDURE — 36415 COLL VENOUS BLD VENIPUNCTURE: CPT | Performed by: INTERNAL MEDICINE

## 2020-05-30 PROCEDURE — 99239 HOSP IP/OBS DSCHRG MGMT >30: CPT | Performed by: HOSPITALIST

## 2020-05-30 PROCEDURE — 99233 SBSQ HOSP IP/OBS HIGH 50: CPT | Performed by: INTERNAL MEDICINE

## 2020-05-30 PROCEDURE — 97116 GAIT TRAINING THERAPY: CPT | Mod: GP

## 2020-05-30 PROCEDURE — 80048 BASIC METABOLIC PNL TOTAL CA: CPT | Performed by: INTERNAL MEDICINE

## 2020-05-30 PROCEDURE — 85520 HEPARIN ASSAY: CPT | Performed by: INTERNAL MEDICINE

## 2020-05-30 PROCEDURE — 25000132 ZZH RX MED GY IP 250 OP 250 PS 637: Performed by: INTERNAL MEDICINE

## 2020-05-30 PROCEDURE — 85027 COMPLETE CBC AUTOMATED: CPT | Performed by: INTERNAL MEDICINE

## 2020-05-30 PROCEDURE — 25000132 ZZH RX MED GY IP 250 OP 250 PS 637: Performed by: HOSPITALIST

## 2020-05-30 RX ADMIN — RIVAROXABAN 15 MG: 15 TABLET, FILM COATED ORAL at 09:10

## 2020-05-30 RX ADMIN — CITALOPRAM HYDROBROMIDE 20 MG: 20 TABLET ORAL at 09:10

## 2020-05-30 RX ADMIN — ACETAMINOPHEN 650 MG: 325 TABLET, FILM COATED ORAL at 04:37

## 2020-05-30 ASSESSMENT — ACTIVITIES OF DAILY LIVING (ADL)
ADLS_ACUITY_SCORE: 14

## 2020-05-30 NOTE — DISCHARGE SUMMARY
[facility]  Hospitalist Discharge Summary      Date of Admission:  5/27/2020  Date of Discharge:  5/30/2020  Discharging Provider: Cheyenne Lofton MD      Discharge Diagnoses   Extensive left lower extremity occlusive DVT and multiple bilateral pulmonary emboli   s/p thrombectomy 5/28  Urinary retention, resolved  History of nephrolithiasis  Anxiety  Idiopathic normal pressure hydrocephalus, status post  shunt placement in 2016    Follow-ups Needed After Discharge   Follow-up Appointments     Follow-up and recommended labs and tests       Follow up with primary care provider, Burke Garcia, within 7 days for   hospital follow- up.  No follow up labs or test are needed.             Unresulted Labs Ordered in the Past 30 Days of this Admission     Date and Time Order Name Status Description    5/29/2020 1051 Factor 2 And 5 Mutation Analysis In process     5/27/2020 1024 Blood culture Preliminary     5/27/2020 1024 Blood culture Preliminary       These results will be followed up by vascular surgery    Discharge Disposition   Discharged to home with home RN and PT  Condition at discharge: Stable    Hospital Course   Rahel Qiu is a 69-year-old female with past medical history of idiopathic and normal pressure hydrocephalus, status post ventriculoperitoneal () shunt placement in 2016, history of nephrolithiasis, anxiety and dyslipidemia, who presents to the Emergency Room today for constellation of symptoms including urinary retention, changes in her gait and shortness of breath.  She was ultimately found to have an extensive left lower extremity deep venous thrombosis (DVT) and  bilateral pulmonary emboli.  She was transferred to [facility] and admitted for ongoing evaluation and care.      Extensive left lower extremity occlusive DVT and multiple bilateral pulmonary emboli.   - s/p thrombectomy 5/28. No IVC filter placed as thrombus was completely removed.  - heparin drip  during inpt stay  - xarelto BID for 3 weeks then once daily     Urinary retention  Poor urine output  - renner removed with successful void     History of nephrolithiasis.  She has a history of staghorn calculi and is on uric acid for staghorn calculi and is on chronic treatment with allopurinol.  This will be held for now.  Can resume following thrombectomy.      Anxiety.  Chronic and stable on Celexa.  This can be continued.      Idiopathic normal pressure hydrocephalus, status post  shunt placement in 2016.  Stable.  Previously followed at [facility] and was last seen there in 2019.  No concerns with shunt malfunction on assessment this evening.     Consultations This Hospital Stay   PHARMACY TO DOSE HEPARIN  [facility] IP CONSULT  PHARMACY TO DOSE HEPARIN  [facility] IP CONSULT  PHARMACY LIAISON FOR MEDICATION COVERAGE CONSULT  PHYSICAL THERAPY ADULT IP CONSULT  CARE TRANSITION RN/SW IP CONSULT    Code Status   Full Code    Time Spent on this Encounter   I, Cheyenne Lofton MD, personally saw the patient today and spent greater than 30 minutes discharging this patient.       Cheyenne Lofton MD  [facility]  ______________________________________________________________________    Physical Exam   Vital Signs: Temp: 97.5  F (36.4  C) Temp src: Oral BP: 122/75 Pulse: 90 Heart Rate: 91 Resp: 16 SpO2: 96 % O2 Device: None (Room air)    Weight: 197 lbs 1.46 oz  GENERAL:  The patient is a well-nourished, well-developed female.  She appears her stated age and is alert, conversing appropriately, in no acute distress.   HEENT:  Pupils equal, round, AND reactive to light and accommodation.  Extraocular movements are intact.  Mucous membranes moist.   CARDIOVASCULAR:  Heart rate and rhythm regular.  No murmurs, gallops, OR rubs.  Pulses are +2 and symmetric in bilateral upper extremities.  She has significant left lower extremity edema to the groin as compared to the  right lower extremity edema.   RESPIRATORY:  Lungs are clear to auscultation bilaterally, free of rales or rhonchi.  No increased work of breathing or accessory muscle use.   ABDOMEN:  Soft, nontender, nondistended.  Positive bowel sounds throughout.   GENITOURINARY:  Quintero is in place and draining clear yellow urine.   INTEGUMENTARY:  Warm and dry.  No rashes, jaundice or ecchymosis.   NEUROLOGIC:  Cranial nerves II-XII are grossly intact.  No focal motor or sensory deficits.  Gait was not assessed.        Primary Care Physician   Burke Garcia    Discharge Orders      Home Care PT Referral for Hospital Discharge      Home care nursing referral      Discharge Instructions    Pt to call [phone] for f/u visit with Dr. Schreiber in 2 months for hospital f/u.     Reason for your hospital stay    Blood clot s/p thrombectomy     Follow-up and recommended labs and tests     Follow up with primary care provider, Burke Garcia, within 7 days for hospital follow- up.  No follow up labs or test are needed.     Activity    Your activity upon discharge: activity as tolerated     MD face to face encounter    Documentation of Face to Face and Certification for Home Health Services    I certify that patient: Jessica Qiu is under my care and that I, or a nurse practitioner or physician's assistant working with me, had a face-to-face encounter that meets the physician face-to-face encounter requirements with this patient on: 5/30/2020.    This encounter with the patient was in whole, or in part, for the following medical condition, which is the primary reason for home health care: anticoagulation, deconditioning, NPH, fall risk.    I certify that, based on my findings, the following services are medically necessary home health services: Nursing and Physical Therapy.    My clinical findings support the need for the above services because: Nurse is needed: To assess understanding of medication changes after changes in  medications or other medical regimen.. and Physical Therapy Services are needed to assess and treat the following functional impairments: deconditioning, gait impairment 2/2 NPH.    Further, I certify that my clinical findings support that this patient is homebound (i.e. absences from home require considerable and taxing effort and are for medical reasons or [employer] services or infrequently or of short duration when for other reasons) because: Leaving home is medically contraindicated for the following reason(s): gait abnormality/fall risk 2/2 normal pressure hydrocephalus..    Based on the above findings. I certify that this patient is confined to the home and needs intermittent skilled nursing care, physical therapy and/or speech therapy.  The patient is under my care, and I have initiated the establishment of the plan of care.  This patient will be followed by a physician who will periodically review the plan of care.  Physician/Provider to provide follow up care: Burke Garcia    Attending hospital physician (the Medicare certified [place] provider): Cheyenne Lofton MD  Physician Signature: See electronic signature associated with these discharge orders.  Date: 5/30/2020     Full Code     Diet    Follow this diet upon discharge: Orders Placed This Encounter      Regular Diet Adult       Significant Results and Procedures   Most Recent 3 CBC's:  Recent Labs   Lab Test 05/30/20  0737 05/28/20  0809 05/27/20  1054   WBC 9.4 13.7* 13.0*   HGB 8.9* 13.8 14.9   MCV 96 94 96    159 165     Most Recent 3 BMP's:  Recent Labs   Lab Test 05/30/20  0737 05/29/20  1831 05/28/20  0809    140 137   POTASSIUM 3.6 3.3* 3.6   CHLORIDE 111* 107 110*   CO2 27 27 20   BUN 9 16 11   CR 0.67 0.94 0.81   ANIONGAP 5 6 7   NICKOLAS 8.2* 8.3* 9.2   GLC 95 100* 107*   ,   Results for orders placed or performed during the hospital encounter of 05/27/20   US Renal Complete    Narrative    ULTRASOUND RETROPERITONEAL COMPLETE  5/29/2020 5:09 PM     HISTORY: Poor urine output, history of calculi.    COMPARISON: None.    FINDINGS: The bilateral renal parenchyma are unremarkable in  echogenicity without evidence for shadowing stone or mass. No renal  cysts. No hydronephrosis. Right kidney measures 9.8 x 5.0 x 4.4 cm and  the left measures 10.7 x 4.6 x 4.9 cm. Cortical thickness is 1.8 cm on  the right and 1.4 cm on the left. Hepatic steatosis noted. Bladder is  unremarkable given its level of distention.      Impression    IMPRESSION: No obstruction demonstrated.    CYNDI CAMILO MD       Discharge Medications   Current Discharge Medication List      START taking these medications    Details   !! rivaroxaban ANTICOAGULANT (XARELTO ANTICOAGULANT) 20 MG TABS tablet Take 1 tablet (20 mg) by mouth daily (with dinner)  Qty: 90 tablet, Refills: 3    Associated Diagnoses: History of deep venous thrombosis      !! rivaroxaban ANTICOAGULANT (XARELTO) 15 MG TABS tablet Take 1 tablet (15 mg) by mouth 2 times daily (with meals)  Qty: 41 tablet, Refills: 0    Associated Diagnoses: History of deep venous thrombosis       !! - Potential duplicate medications found. Please discuss with provider.      CONTINUE these medications which have NOT CHANGED    Details   citalopram (CELEXA) 20 MG tablet TAKE 1 TABLET BY MOUTH ONE TIME DAILY  Qty: 30 tablet, Refills: 0    Associated Diagnoses: Depression with anxiety      allopurinol (ZYLOPRIM) 300 MG tablet Take 1 tablet (300 mg) by mouth daily  Qty: 90 tablet, Refills: 3    Associated Diagnoses: Nephrolithiasis      azelastine (OPTIVAR) 0.05 % SOLN ophthalmic solution Apply 1 drop to eye 2 times daily  Qty: 1 Bottle, Refills: 5    Associated Diagnoses: Allergic conjunctivitis, bilateral      diphenhydrAMINE (BENADRYL) 25 MG tablet Take 25 mg by mouth 2 times daily as needed for allergies       fluticasone (FLONASE) 50 MCG/ACT spray Spray 1-2 sprays into both nostrils daily  Qty: 16 g, Refills: 11    Associated  Diagnoses: Seasonal allergic rhinitis due to pollen, unspecified chronicity; Allergic conjunctivitis, bilateral           Allergies   Allergies   Allergen Reactions     Simvastatin Muscle Pain (Myalgia)     extreme muscle and joint pains     Hmg-Coa-R Inhibitors      Muscle pain and stiffness     Pollen Extract      Pt. Has hayfever

## 2020-05-30 NOTE — PLAN OF CARE
A&Ox4. VSS on RA. Tele NSR. Up A1 W, did not ambulate overnight. Headache pain managed with tylenol x1, otherwise denies pain. Bilat popliteal dressings CDI. ACE wraps in place. CMS intact. BS+, +gas. L groin site with liq bandage, ecchymotic, soft. Hep gtt @ 12/hr to stop at 0900. Quintero in place until further direction. Likely discharge today.

## 2020-05-30 NOTE — PLAN OF CARE
Discharge Planner PT   Patient plan for discharge: Home  Current status: Pt sitting up in chair upon arrival; agreeable to therapy. STS transfer completed with SBA. Ambulated 500 ft x 1 with 4WW and SBA; tolerated well. 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 IND with exercises.   Barriers to return to prior living situation: Fall history  Recommendations for discharge: Home with 4WW for all ambulation, home health PT per plan established by the PT.  Rationale for recommendations: Pt is below baseline and would benefit from home health PT to increase endurance and mobility and perform a home safety evaluation. Pt would benefit from use of 4ww for all mobility inside home and out in the community, patient verbalizes understanding.       Entered by: Nikki Wynn 05/30/2020 10:32 AM      Pt discharged to home. PT goals not met

## 2020-05-30 NOTE — PROGRESS NOTES
[facility]    Vascular Medicine Progress Note    Date of Service (when I saw the patient): 05/30/2020     Assessment & Plan   Jessica Qiu is a 69 year old female who was admitted on 5/27/2020.     1.  Extensive acute occlusive thrombus extending from the distal IVC into the Lt common iliac to calf veins at level of ankle w/ multiple small subsegmental bilateral PEs w/o right heart strain on CTA     -S/P successful mechanical thrombectomy of thrombus for IVC down to popliteal vein. Given thrombectomy volume and absence of right heart strain, no IVC filter placed.      -On IV heparin, will change to Xarelto later this AM.      -Thigh high compression stockings 20 to 30 mmHg prescribed at the time of discharge today.    -Will not pursue any hypercoagulable studies except  factor II and factor V Leyden mutation given family history of father with DVTs.    -She will need lower extremity venous duplex in 1 month and then follow-up with IR Dr. Dumont.    -Follow up with  at [facility] in 2-3 months ([phone]).         3.  History of Idiopathic normal pressure hydrocephalus.      -Stable.    -This was diagnosed in 2016, previously underwent  shunt placement in 03/2016 at the [facility].  Last seen by Neurosurgery at the [facility] in the spring of 2019 and had plans to establish with a neurosurgeon locally.     4.  Ascending aortic 46 mm aneurysm      -Asx. Yearly TTE surveillance.     5.  Dyslipidemia    -Not taking any statins listed as a myalgias for simvastatin and other statins.    -. May consider in the future pitavastatin or Lescol (these are better tolerated statins). This will be discussed with her by Dr. Schreiber in outpatient f/u.                     Interval History   Doing well post thrombectomy    Review Of Systems  Skin: negative  Eyes: negative  Ears/Nose/Throat: negative  Respiratory: No shortness of breath, dyspnea on exertion, cough, or  hemoptysis  Cardiovascular: negative  Gastrointestinal: negative  Genitourinary: negative  Musculoskeletal: negative  Neurologic: negative  Psychiatric: negative  Hematologic/Lymphatic/Immunologic: negative  Endocrine: negative      Physical Exam   Temp: 98.1  F (36.7  C) Temp src: Oral BP: 111/58 Pulse: 83 Heart Rate: 74 Resp: 16 SpO2: 92 % O2 Device: None (Room air)    Vitals:    05/28/20 0600 05/29/20 0500 05/30/20 0700   Weight: 89.8 kg (197 lb 15.6 oz) 89.4 kg (197 lb 1.5 oz) 89.4 kg (197 lb 1.5 oz)     Vital Signs with Ranges  Temp:  [97.4  F (36.3  C)-98.1  F (36.7  C)] 98.1  F (36.7  C)  Pulse:  [83-84] 83  Heart Rate:  [74-90] 74  Resp:  [16] 16  BP: (103-115)/(58-65) 111/58  SpO2:  [92 %-94 %] 92 %  I/O last 3 completed shifts:  In: 2244.2 [P.O.:960; I.V.:1284.2]  Out: 750 [Urine:750]    Constitutional: Awake, alert, cooperative, no apparent distress, and appears stated age.  Eyes: Lids and lashes normal, pupils equal, round and reactive to light, extra ocular muscles intact, sclera clear, conjunctiva normal.  ENT: Normocephalic, without obvious abnormality  Respiratory: No increased work of breathing, good air exchange, clear to auscultation bilaterally, no crackles or wheezing.  Cardiovascular: Normal apical impulse, regular rate and rhythm, normal S1 and S2, no S3 or S4, and no murmur noted.  GI:  normal bowel sounds, soft, non-distended, non-tender, no masses palpated, no hepatosplenomegaly.  Lymph/Hematologic: No cervical lymphadenopathy and no supraclavicular lymphadenopathy.  Genitourinary:  Quintero in place  Skin: No bruising or bleeding, normal skin color, texture, turgor, no redness, warmth, or swelling, no rashes, no lesions,  nails normal without discoloration or clubbing and no jaundice.  Musculoskeletal:improved leg swelling compared to admission  Neurologic: Awake, alert, oriented to name, place and time.  Cranial nerves II-XII are grossly intact.  Motor is 5 out of 5 bilaterally.    Neuropsychiatric: Calm, normal eye contact, alert, normal affect, oriented to self, place, time and situation, memory for past and recent events intact and thought process normal.  Pulses:  Good palpable pulses.    Medications     HEParin 1,200 Units/hr (05/29/20 8785)     - MEDICATION INSTRUCTIONS -         citalopram  20 mg Oral Daily     rivaroxaban ANTICOAGULANT  15 mg Oral BID w/meals     sodium chloride (PF)  3 mL Intracatheter Q8H       Data   Recent Labs   Lab 05/29/20  1831 05/28/20  0809 05/27/20  1054   WBC  --  13.7* 13.0*   HGB  --  13.8 14.9   MCV  --  94 96   PLT  --  159 165   INR  --   --  1.11    137 135   POTASSIUM 3.3* 3.6 3.7   CHLORIDE 107 110* 104   CO2 27 20 23   BUN 16 11 12   CR 0.94 0.81 1.00   ANIONGAP 6 7 8   NICKOLAS 8.3* 9.2 9.3   * 107* 120*   ALBUMIN  --   --  4.0   PROTTOTAL  --   --  8.3   BILITOTAL  --   --  1.2   ALKPHOS  --   --  80   ALT  --   --  27   AST  --   --  21

## 2020-05-30 NOTE — PLAN OF CARE
Pt discharged to home via private car accompanied by spouse. Discharge instruction given to pt and daughter over speaker phone. Daughter states understanding. Prescription for compression stockings sent with pt. Home PT set up with pt. Prescriptions filled and sent with pt

## 2020-05-30 NOTE — CONSULTS
Care Transition Initial Assessment - RN        Met with: Patient.  DATA   Active Problems:    DVT (deep vein thrombosis) in pregnancy       Cognitive Status: awake, alert and oriented.     Contact information and PCP information verified: Yes  Lives With: spouse   Living Arrangements: apartment, condo     Insurance concerns: No Insurance issues identified  ASSESSMENT  Patient currently receives the following services:  None identified     Identified issues/concerns regarding health management: new blood thinner, need for continued physical therapy. Discussed MD recommendations for [facility] RN and PT. Patient first choice would be for [facility], called and left a message, if unable then referral to [facility] called back and they are unable to accept the referral in that area. Referral sent to [place] via standard process.   PLAN  Financial costs for the patient include per insurance coverage .  Patient given options and choices for discharge yes .  Patient/family is agreeable to the plan?  Yes: agreeable to [facility]  Patient anticipates discharging to home .        Patient anticipates needs for home equipment: No, has a cane, walker and a wheelchair she states.   Transportation/person available to transport on day of discharge  is TBD and have they been notified/set up TBD  Plan/Disposition: Home   Appointments: see S      Care  (CTS) will continue to follow as needed.    Estefany Patton RN   [facility]   Phone [phone]

## 2020-05-31 ENCOUNTER — HOSPITAL ENCOUNTER (EMERGENCY)
Facility: CLINIC | Age: 70
Discharge: HOME | End: 2020-06-01
Attending: NURSE PRACTITIONER | Admitting: NURSE PRACTITIONER
Payer: COMMERCIAL

## 2020-05-31 ENCOUNTER — APPOINTMENT (OUTPATIENT)
Dept: CT IMAGING | Facility: CLINIC | Age: 70
End: 2020-05-31
Attending: NURSE PRACTITIONER
Payer: COMMERCIAL

## 2020-05-31 DIAGNOSIS — R42 DIZZINESS: ICD-10-CM

## 2020-05-31 DIAGNOSIS — R07.89 CHEST PRESSURE: ICD-10-CM

## 2020-05-31 DIAGNOSIS — I26.99 PULMONARY EMBOLISM (H): ICD-10-CM

## 2020-05-31 LAB
ALBUMIN SERPL-MCNC: 3.1 G/DL (ref 3.4–5)
ALP SERPL-CCNC: 60 U/L (ref 40–150)
ALT SERPL W P-5'-P-CCNC: 36 U/L (ref 0–50)
ANION GAP SERPL CALCULATED.3IONS-SCNC: 6 MMOL/L (ref 3–14)
AST SERPL W P-5'-P-CCNC: 44 U/L (ref 0–45)
BILIRUB SERPL-MCNC: 0.5 MG/DL (ref 0.2–1.3)
BUN SERPL-MCNC: 11 MG/DL (ref 7–30)
CALCIUM SERPL-MCNC: 8.8 MG/DL (ref 8.5–10.1)
CHLORIDE SERPL-SCNC: 109 MMOL/L (ref 94–109)
CO2 SERPL-SCNC: 26 MMOL/L (ref 20–32)
CREAT SERPL-MCNC: 0.7 MG/DL (ref 0.52–1.04)
ERYTHROCYTE [DISTWIDTH] IN BLOOD BY AUTOMATED COUNT: 13.7 % (ref 10–15)
GFR SERPL CREATININE-BSD FRML MDRD: 88 ML/MIN/{1.73_M2}
GLUCOSE SERPL-MCNC: 92 MG/DL (ref 70–99)
HCT VFR BLD AUTO: 31.3 % (ref 35–47)
HGB BLD-MCNC: 9.6 G/DL (ref 11.7–15.7)
MCH RBC QN AUTO: 30.8 PG (ref 26.5–33)
MCHC RBC AUTO-ENTMCNC: 30.7 G/DL (ref 31.5–36.5)
MCV RBC AUTO: 100 FL (ref 78–100)
PLATELET # BLD AUTO: 213 10E9/L (ref 150–450)
POTASSIUM SERPL-SCNC: 3.2 MMOL/L (ref 3.4–5.3)
PROT SERPL-MCNC: 7.1 G/DL (ref 6.8–8.8)
RBC # BLD AUTO: 3.12 10E12/L (ref 3.8–5.2)
SODIUM SERPL-SCNC: 141 MMOL/L (ref 133–144)
TROPONIN I SERPL-MCNC: <0.015 UG/L (ref 0–0.04)
WBC # BLD AUTO: 10.6 10E9/L (ref 4–11)

## 2020-05-31 PROCEDURE — 99285 EMERGENCY DEPT VISIT HI MDM: CPT | Mod: 25

## 2020-05-31 PROCEDURE — 93005 ELECTROCARDIOGRAM TRACING: CPT

## 2020-05-31 PROCEDURE — 80053 COMPREHEN METABOLIC PANEL: CPT | Performed by: NURSE PRACTITIONER

## 2020-05-31 PROCEDURE — 84484 ASSAY OF TROPONIN QUANT: CPT | Performed by: NURSE PRACTITIONER

## 2020-05-31 PROCEDURE — 25000125 ZZHC RX 250: Performed by: NURSE PRACTITIONER

## 2020-05-31 PROCEDURE — 70450 CT HEAD/BRAIN W/O DYE: CPT

## 2020-05-31 PROCEDURE — 85027 COMPLETE CBC AUTOMATED: CPT | Performed by: NURSE PRACTITIONER

## 2020-05-31 PROCEDURE — 25000128 H RX IP 250 OP 636: Performed by: NURSE PRACTITIONER

## 2020-05-31 PROCEDURE — 71275 CT ANGIOGRAPHY CHEST: CPT

## 2020-05-31 RX ORDER — IOPAMIDOL 755 MG/ML
500 INJECTION, SOLUTION INTRAVASCULAR ONCE
Status: COMPLETED | OUTPATIENT
Start: 2020-05-31 | End: 2020-05-31

## 2020-05-31 RX ADMIN — IOPAMIDOL 66 ML: 755 INJECTION, SOLUTION INTRAVENOUS at 22:30

## 2020-05-31 RX ADMIN — SODIUM CHLORIDE 85 ML: 9 INJECTION, SOLUTION INTRAVENOUS at 22:32

## 2020-05-31 ASSESSMENT — ENCOUNTER SYMPTOMS
SHORTNESS OF BREATH: 0
NAUSEA: 0
DIZZINESS: 1
VOMITING: 0

## 2020-05-31 NOTE — ED AVS SNAPSHOT
[facility] Emergency Department  [address]  [place] [address]  Phone:  [phone]  Fax:  [phone]                                    Jessica Qiu   MRN: [id number]    Department:  [facility] Emergency Department   Date of Visit:  5/31/2020           After Visit Summary Signature Page    I have received my discharge instructions, and my questions have been answered. I have discussed any challenges I see with this plan with the nurse or doctor.    ..........................................................................................................................................  Patient/Patient Representative Signature      ..........................................................................................................................................  Patient Representative Print Name and Relationship to Patient    ..................................................               ................................................  Date                                   Time    ..........................................................................................................................................  Reviewed by Signature/Title    ...................................................              ..............................................  Date                                               Time          22EPIC Rev 08/18

## 2020-06-01 ENCOUNTER — TELEPHONE (OUTPATIENT)
Dept: OTHER | Facility: CLINIC | Age: 70
End: 2020-06-01

## 2020-06-01 ENCOUNTER — TELEPHONE (OUTPATIENT)
Dept: FAMILY MEDICINE | Facility: CLINIC | Age: 70
End: 2020-06-01

## 2020-06-01 VITALS
WEIGHT: 195 LBS | DIASTOLIC BLOOD PRESSURE: 77 MMHG | SYSTOLIC BLOOD PRESSURE: 133 MMHG | BODY MASS INDEX: 33.47 KG/M2 | TEMPERATURE: 98.5 F | RESPIRATION RATE: 26 BRPM | HEART RATE: 75 BPM | OXYGEN SATURATION: 97 %

## 2020-06-01 LAB — INTERPRETATION ECG - MUSE: NORMAL

## 2020-06-01 ASSESSMENT — ENCOUNTER SYMPTOMS
FEVER: 0
CHILLS: 0

## 2020-06-01 NOTE — TELEPHONE ENCOUNTER
[facility]     Who is the name of the provider?:  Candie      What is the location you see this provider at?: [place]    Reason for call:  Seen in [facility] ED 5/31 for chest pain. Told to follow up with Dr. Schreiber    Can we leave a detailed message on this number?  YES

## 2020-06-01 NOTE — TELEPHONE ENCOUNTER
Called, relayed instructions from Dr. Schreiber to Jessica.    Patient confirmed understanding and will follow up with PCP and neurology.  She will call scheduling to make an appointment for one month from now with Dr. Schreiber.    Left message for patient's daughter Elizabeth to the same effect.    Ainsley Keating RN BSN  [facility]  [phone]

## 2020-06-01 NOTE — ED NOTES
Called to room as was informed that pt watched video explaining observation status and is not interested in observation admission.  Discussed with pt the risks of missed TIA/CVA and delayed treatment, including worsening neurological status, permanent deficits, death.  Pt is accepting of these risks and would like to go home and follow-up with her PCP for outpatient workup.  Pt sober, with capacity.  MRI canceled at pt request.  Pt to be discharged.     Mauro Monaco MD  06/01/20 0102

## 2020-06-01 NOTE — TELEPHONE ENCOUNTER
Patient was discharged 5/30/20 after thrombectomy of thrombus for IVC down to popliteal vein.  No IVC filter was placed.  Started Xarelto on discharge.  Was instructed to f/u with Dr. Schreiber in 2-3 weeks.    Returned to ED 5/31/20 for chest pain.    CT Chest w/contrast revealed:    CT Chest PE w Contrast:  1.  Multiple small bilateral pulmonary emboli, increased from the previous exam. No evidence of right heart strain.  2.  Borderline aneurysmal ascending thoracic aorta. No aortic dissection.  3.  Solitary tiny indeterminate right upper lobe lung nodule.  As read by Radiology    Plan from ED: Given her clot burden this could represent small CVAs and will need likely follow-up with MRI and possibly echocardiogram.       Patient was not interested in observation admission - chose to go home and follow up with PCP for outpatient workup.    Routing to Dr. Schreiber to determine timing of appointment, imaging required and in-person vs. Video consult.    Ainsley Keating RN BSN  [facility]  [phone]

## 2020-06-01 NOTE — TELEPHONE ENCOUNTER
First See neurologist and primary MD     Continue Eliquis as suggested     See me in a month either video or office visit.     LG

## 2020-06-01 NOTE — ED PROVIDER NOTES
History   Chief Complaint:  Chest Pain     HPI   Jessica Qiu is an anticoagulated 69 year old female with a history of DVT s/p thrombectomy on 05/28, hyperlipidemia, and hydrocephalus s/p  shunt who presents with chest pain. On review of the patient's chart, the patient had a thrombectomy on 05/28 for an extensive left lower extremity occlusive DVT which was successfully removed. She was additionally noted to have bilateral PE's. She was started on Xarelto two days ago and has been compliant with this. She was discharged to home yesterday and notes she felt well at that time. The patient reports that throughout the day today she has had chest pressure in the center of her chest. About one hour ago she developed dizziness and the sensation that she is unable to move or speak efficiently. She notes that these symptoms feel the same as when she came to the ED initially with the DVT/PE's. She denies nausea, vomiting, and shortness of breath.    Allergies:  Simvastatin  Hmg-Coa-R Inhibitors    Medications:   Allopurinol  Celexa  Xarelto    Past Medical History:    Cognitive decline  Gait disorder  Nephrolithiasis   DVT  Foot drop, bilateral  Aortic valve insufficiency  Depression with anxiety  Hydrocephalus  Idiopathic scoliosis  Hyperlipidemia      Past Surgical History:    Breast biopsy   shunt placement  Tonsillectomy   Right stent placement   Lithotripsy     Family History:    Brain aneurysm  Colorectal cancer    Social History:  Smoking status- never smoker  Alcohol use- yes  Drug use- no    Review of Systems   Constitutional: Negative for chills and fever.   Respiratory: Negative for shortness of breath.    Cardiovascular: Positive for chest pain.   Gastrointestinal: Negative for nausea and vomiting.   Neurological: Positive for dizziness.   All other systems reviewed and are negative.      Physical Exam     Patient Vitals for the past 24 hrs:   BP Temp Temp src Pulse Heart Rate Resp SpO2 Weight    05/31/20 2332 -- -- -- -- 73 20 95 % --   05/31/20 2330 -- -- -- -- 74 20 95 % --   05/31/20 2329 -- -- -- -- 74 18 96 % --   05/31/20 2301 133/77 -- -- 75 -- -- -- --   05/31/20 2224 -- -- -- -- 76 23 -- --   05/31/20 2222 -- -- -- -- 71 13 -- --   05/31/20 2221 -- -- -- -- 77 15 -- --   05/31/20 2142 (!) 148/77 -- -- 73 85 25 -- --   05/31/20 2118 (!) 147/83 98.5  F (36.9  C) Oral 85 -- 16 99 % 88.5 kg (195 lb)     Physical Exam  General: Alert, Mild  discomfort, well kept  Eyes: PERRL, conjunctivae pink no scleral icterus or conjunctival injection  ENT:   Moist mucus membranes, posterior oropharynx clear without erythema or exudates, No lymphadenopathy, Normal voice  Resp:  Lungs clear to auscultation bilaterally, no crackles/rubs/wheezes. Good air movement  CV:  Normal rate and rhythm, no murmurs/rubs/gallops  GI:  Abdomen soft and non-distended.  Normoactive BS.  No tenderness, guarding or rebound, No masses  Skin:  Warm, dry.  No rashes or petechiae  Musculoskeletal: No peripheral edema or calf tenderness, Normal gross ROM   Neuro: Alert and oriented to person/place/time, normal sensation  Psychiatric: anxious, cooperative, poor eye contact    Emergency Department Course   ECG (21:40:53):  Rate 74 bpm. CA interval 186. QRS duration 86. QT/QTc 422/468. P-R-T axes 37 -10 46. Normal sinus rhythm. Voltage criteria for left ventricular hypertrophy. Nonspecific ST abnormality. No significant change compared to prior EKG dated 05/27/2020. Interpreted at 2149 by Carmelina Thompson MD.    Imaging:  Radiographic findings were communicated with the patient who voiced understanding of the findings.    Head CT w/o Contrast:  1.  Stable exam. No acute intracranial abnormality.  2.  Stable right ventriculostomy shunt catheter placement and stable ventricular size.  3.  Stable age-related change.  As read by Radiology.    CT Chest PE w Contrast:  1.  Multiple small bilateral pulmonary emboli, increased from the previous exam.  No evidence of right heart strain.  2.  Borderline aneurysmal ascending thoracic aorta. No aortic dissection.  3.  Solitary tiny indeterminate right upper lobe lung nodule.  As read by Radiology.    Laboratory:  Laboratory findings were communicated with the patient who voiced understanding of the findings.    CBC: HGB 9.6 (L) o/w WNL (WBC 10.6, )  CMP: Potassium 3.2 (L), Albumin 3.1 (L) o/w WNL (Creatinine 0.70)  Troponin: <0.015    Emergency Department Course:  Past medical records, nursing notes, and vitals reviewed.   2136 I performed an exam of the patient and obtained history, as documented above. EKG obtained, results above.     IV inserted and blood drawn.   The patient was sent for a chest CT and head CT while in the emergency department, findings above.    2304 I spoke to Dr. Barone of radiology regarding findings on the patient's CT.     2345 I rechecked the patient. Explained findings to the Patient.    0009 I spoke to Dr. Paredes of the hospitalist service who accepts the patient for admission.     Findings and plan explained to the patient who consents to admission. Discussed the patient with Dr. Paredes, who will admit the patient to a obs bed for further monitoring, evaluation, and treatment.    Impression & Plan    Medical Decision Making:  Jessica Qiu is a 69 year old female who presents today for evaluation of chest pressure substernally.  She also complained of intermittent episodes of dizziness with occasional visual changes.  She is unable to qualify the dizziness and visual changes she does states she has had intermittent episodes of this.  She has recently had thrombectomy bilateral lower extremities for DVT she is on Xarelto and does have known PEs.  I did obtain a CT scan to evaluate for increased clot burden in her lungs.  She does have small peripheral PEs that is a larger clot burden then previous evaluation however these are still small and do not cause right heart strain.   Her laboratory studies show a negative troponin.  She has a nonacute EKG.  The remainder of her laboratory studies are noncontributory.  Given her history and symptoms I also obtained a head CT which showed no evidence of hydrocephalus and no obvious infarcts.  Given her clot burden this could represent small CVAs and will need likely follow-up with MRI and possibly echocardiogram.  Plan will be to admit patient to the hospitalist service for continued evaluation and possibly cardiology versus neuro versus vascular medicine consult.    Diagnosis:    ICD-10-CM   1. Pulmonary embolism (H)  I26.99   2. Dizziness  R42   3. Chest pressure  R07.89        Disposition:  Admitted to observation    5/31/2020   Markie Stout I, Markie Stout, am serving as a scribe at 9:36 PM on 5/31/2020 to document services personally performed by Bernardo Gunn APRN based on my observations and the provider's statements to me.      Bernardo Gunn APRN CNP  06/01/20 0009

## 2020-06-01 NOTE — ED NOTES
[facility]  ED Nurse Handoff Report    Jessica Qiu is a 69 year old female   ED Chief complaint: Chest Pain  . ED Diagnosis:   Final diagnoses:   Pulmonary embolism (H)   Dizziness   Chest pressure     Allergies:   Allergies   Allergen Reactions     Simvastatin Muscle Pain (Myalgia)     extreme muscle and joint pains     Hmg-Coa-R Inhibitors      Muscle pain and stiffness     Pollen Extract      Pt. Has hayfever       Code Status: Full Code  Activity level - Baseline/Home:  Stand by Assist. Activity Level - Current:   Assist X 1. Lift room needed: No. Bariatric: No   Needed: No   Isolation: No. Infection: Not Applicable.     Vital Signs:   Vitals:    05/31/20 2142 05/31/20 2221 05/31/20 2222 05/31/20 2224   BP: (!) 148/77      Pulse: 73      Resp: 25 15 13 23   Temp:       TempSrc:       SpO2:       Weight:           Cardiac Rhythm:  ,   Cardiac  Cardiac Rhythm: Normal sinus rhythm  Pain level:    Patient confused: No. Patient Falls Risk: No.   Elimination Status: did not void   Patient Report - Initial Complaint: chest pain, feeling off  Focused Assessment: Jessica Qiu is an anticoagulated 69 year old female with a history of DVT s/p thrombectomy on 05/28, hyperlipidemia, and hydrocephalus s/p  shunt who presents with chest pain. On review of the patient's chart, the patient had a thrombectomy on 05/28 for an extensive left lower extremity occlusive DVT which was successfully removed. She was additionally noted to have bilateral PE's. She was started on Xarelto two days ago and has been compliant with this. She was discharged to home yesterday and notes she felt well at that time. The patient reports that throughout the day today she has had chest pressure in the center of her chest. About one hour ago she developed dizziness and the sensation that she is unable to move or speak efficiently. She notes that these symptoms feel the same as when she came to the ED initially with  the DVT/PE's. She denies nausea, vomiting, and shortness of breath.  Tests Performed: ct, labs   Abnormal Results:   Head CT w/o contrast   Final Result   IMPRESSION:   1.  Stable exam. No acute intracranial abnormality.      2.  Stable right ventriculostomy shunt catheter placement and stable ventricular size.      3.  Stable age-related change.      CT Chest Pulmonary Embolism w Contrast   Preliminary Result   IMPRESSION:   1.  Multiple small bilateral pulmonary emboli, increased from the previous exam. No evidence of right heart strain.   2.  Borderline aneurysmal ascending thoracic aorta. No aortic dissection.   3.  Solitary tiny indeterminate right upper lobe lung nodule.         [Critical Result: Pulmonary embolism]      Finding was identified on 5/31/2020 10:57 PM.       Dr. Gunn was contacted by me on 5/31/2020 11:01 PM and verbalized understanding of the critical result.      Recommendations for one or multiple incidental lung nodules < 6mm :     Low risk patients: No routine follow-up.     High risk patients: Optional follow-up CT at 12 months; if unchanged, no further follow-up.      *Low Risk: Minimal or absent history of smoking or other known risk factors.   *Nonsolid (ground glass) or partly solid nodules may require longer follow-up to exclude indolent adenocarcinoma.   *Recommendations based on Guidelines for the Management of Incidental Pulmonary Nodules Detected at CT: From the Fleischner Society 2017, Radiology 2017.           Labs Ordered and Resulted from Time of ED Arrival Up to the Time of Departure from the ED   CBC WITH PLATELETS - Abnormal; Notable for the following components:       Result Value    RBC Count 3.12 (*)     Hemoglobin 9.6 (*)     Hematocrit 31.3 (*)     MCHC 30.7 (*)     All other components within normal limits   COMPREHENSIVE METABOLIC PANEL - Abnormal; Notable for the following components:    Potassium 3.2 (*)     Albumin 3.1 (*)     All other components within normal  limits   TROPONIN I   MAY SALINE LOCK IV     Treatments provided:   Family Comments:none  OBS brochure/video discussed/provided to patient:  Yes  ED Medications:   Medications   Saline CT scan flush (85 mLs Intravenous Given 5/31/20 2232)   iopamidol (ISOVUE-370) solution 500 mL (66 mLs Intravenous Given 5/31/20 2230)     Drips infusing:  No  For the majority of the shift, the patient's behavior Green. Interventions performed were na.    Sepsis treatment initiated: No       ED Nurse Name/Phone Number: Mar Sarmiento RN,   11:29 PM

## 2020-06-01 NOTE — TELEPHONE ENCOUNTER
Spoke with patient.  Both puncture sites are without bleeding.  Dressing still in place.  Instructed to remove dressing slowly.  Do not remove skin glue if still in place.  May shower. And cover with bandaid.  Doing well today, no SOB or chest pain.  Fabienne Holden RN  IR nurse clinician  [phone]

## 2020-06-01 NOTE — ED TRIAGE NOTES
"Pt reports she is having a \"funny sensation\" in the center of her chest, feels flushed and is having a little pain, feels like someone is \"pressing on my chest\". Sensation started about 20 minutes ago. Pt denies shortness of breath or cough, denies chest tightness. Pt states it feels the same as when she came in last week and was diagnosed with blood clots. Pt was discharged yesterday.     "

## 2020-06-01 NOTE — TELEPHONE ENCOUNTER
Please call patient for Inpatient Discharge f/u 05/30/20. History of Deep Venous Thrombosis. Ruth Behrens.

## 2020-06-01 NOTE — TELEPHONE ENCOUNTER
Patient sees Dr. Burke Garcia as PCP ([facility]), has not been seen by a provider at this clinic for 2 years.   Monet Nowak, CHRISTINEN, RN, PHN

## 2020-06-02 ENCOUNTER — APPOINTMENT (OUTPATIENT)
Dept: MRI IMAGING | Facility: CLINIC | Age: 70
End: 2020-06-02
Attending: EMERGENCY MEDICINE
Payer: COMMERCIAL

## 2020-06-02 ENCOUNTER — HOSPITAL ENCOUNTER (EMERGENCY)
Facility: CLINIC | Age: 70
Discharge: HOME | End: 2020-06-02
Attending: EMERGENCY MEDICINE | Admitting: EMERGENCY MEDICINE
Payer: COMMERCIAL

## 2020-06-02 VITALS
TEMPERATURE: 98.3 F | DIASTOLIC BLOOD PRESSURE: 87 MMHG | WEIGHT: 195 LBS | HEART RATE: 85 BPM | BODY MASS INDEX: 33.47 KG/M2 | SYSTOLIC BLOOD PRESSURE: 119 MMHG | OXYGEN SATURATION: 97 % | RESPIRATION RATE: 20 BRPM

## 2020-06-02 DIAGNOSIS — R42 DIZZINESS: ICD-10-CM

## 2020-06-02 LAB
ANION GAP SERPL CALCULATED.3IONS-SCNC: 5 MMOL/L (ref 3–14)
BACTERIA SPEC CULT: NO GROWTH
BACTERIA SPEC CULT: NO GROWTH
BASOPHILS # BLD AUTO: 0.1 10E9/L (ref 0–0.2)
BASOPHILS NFR BLD AUTO: 0.8 %
BUN SERPL-MCNC: 11 MG/DL (ref 7–30)
CALCIUM SERPL-MCNC: 8.8 MG/DL (ref 8.5–10.1)
CHLORIDE SERPL-SCNC: 108 MMOL/L (ref 94–109)
CO2 SERPL-SCNC: 29 MMOL/L (ref 20–32)
CREAT SERPL-MCNC: 0.8 MG/DL (ref 0.52–1.04)
DIFFERENTIAL METHOD BLD: ABNORMAL
EOSINOPHIL # BLD AUTO: 0.6 10E9/L (ref 0–0.7)
EOSINOPHIL NFR BLD AUTO: 6.3 %
ERYTHROCYTE [DISTWIDTH] IN BLOOD BY AUTOMATED COUNT: 14.3 % (ref 10–15)
GFR SERPL CREATININE-BSD FRML MDRD: 74 ML/MIN/{1.73_M2}
GLUCOSE SERPL-MCNC: 87 MG/DL (ref 70–99)
HCT VFR BLD AUTO: 28.8 % (ref 35–47)
HGB BLD-MCNC: 9 G/DL (ref 11.7–15.7)
IMM GRANULOCYTES # BLD: 0.2 10E9/L (ref 0–0.4)
IMM GRANULOCYTES NFR BLD: 1.6 %
LYMPHOCYTES # BLD AUTO: 2.6 10E9/L (ref 0.8–5.3)
LYMPHOCYTES NFR BLD AUTO: 26.8 %
MCH RBC QN AUTO: 30.9 PG (ref 26.5–33)
MCHC RBC AUTO-ENTMCNC: 31.3 G/DL (ref 31.5–36.5)
MCV RBC AUTO: 99 FL (ref 78–100)
MONOCYTES # BLD AUTO: 1.1 10E9/L (ref 0–1.3)
MONOCYTES NFR BLD AUTO: 11 %
NEUTROPHILS # BLD AUTO: 5.1 10E9/L (ref 1.6–8.3)
NEUTROPHILS NFR BLD AUTO: 53.5 %
NRBC # BLD AUTO: 0 10*3/UL
NRBC BLD AUTO-RTO: 0 /100
PLATELET # BLD AUTO: 218 10E9/L (ref 150–450)
POTASSIUM SERPL-SCNC: 3.2 MMOL/L (ref 3.4–5.3)
RBC # BLD AUTO: 2.91 10E12/L (ref 3.8–5.2)
SODIUM SERPL-SCNC: 142 MMOL/L (ref 133–144)
SPECIMEN SOURCE: NORMAL
SPECIMEN SOURCE: NORMAL
WBC # BLD AUTO: 9.6 10E9/L (ref 4–11)

## 2020-06-02 PROCEDURE — 70553 MRI BRAIN STEM W/O & W/DYE: CPT

## 2020-06-02 PROCEDURE — 80048 BASIC METABOLIC PNL TOTAL CA: CPT | Performed by: EMERGENCY MEDICINE

## 2020-06-02 PROCEDURE — 25000132 ZZH RX MED GY IP 250 OP 250 PS 637: Performed by: EMERGENCY MEDICINE

## 2020-06-02 PROCEDURE — 25500064 ZZH RX 255 OP 636: Performed by: EMERGENCY MEDICINE

## 2020-06-02 PROCEDURE — 85025 COMPLETE CBC W/AUTO DIFF WBC: CPT | Performed by: EMERGENCY MEDICINE

## 2020-06-02 PROCEDURE — A9585 GADOBUTROL INJECTION: HCPCS | Performed by: EMERGENCY MEDICINE

## 2020-06-02 PROCEDURE — 99285 EMERGENCY DEPT VISIT HI MDM: CPT | Mod: 25

## 2020-06-02 RX ORDER — GADOBUTROL 604.72 MG/ML
10 INJECTION INTRAVENOUS ONCE
Status: COMPLETED | OUTPATIENT
Start: 2020-06-02 | End: 2020-06-02

## 2020-06-02 RX ORDER — LORAZEPAM 0.5 MG/1
0.5 TABLET ORAL ONCE
Status: COMPLETED | OUTPATIENT
Start: 2020-06-02 | End: 2020-06-02

## 2020-06-02 RX ADMIN — GADOBUTROL 10 ML: 604.72 INJECTION INTRAVENOUS at 15:50

## 2020-06-02 RX ADMIN — LORAZEPAM 0.5 MG: 0.5 TABLET ORAL at 15:00

## 2020-06-02 NOTE — PROGRESS NOTES
Per Mar Stern--proceed with MRI and patient has been instructed to follow up with Dr. Torres within next 24-72 hours to check shunt setting.   Verified per make/model shunt is MRI compatible and ok to scan. 6/2/20 6807

## 2020-06-02 NOTE — PROGRESS NOTES
Paged by Dr. López regarding shunt check after MRI. Sent by neurologist to have MRI.     Recommend obtaining MRI and contacting patient's neurosurgical office (Dr. Torres) to have shunt checked after MRI.     Discussed with Dr. Checo Stern PA-C  [facility]  [address] [place] [id number]  Tel [phone]  Fax [phone]

## 2020-06-02 NOTE — ED AVS SNAPSHOT
[facility] Emergency Department  [address]  [facility] [address]  Phone:  [phone]  Fax:  [phone]                                    Jessica Qiu   MRN: [id number]    Department:  [facility] Emergency Department   Date of Visit:  6/2/2020           After Visit Summary Signature Page    I have received my discharge instructions, and my questions have been answered. I have discussed any challenges I see with this plan with the nurse or doctor.    ..........................................................................................................................................  Patient/Patient Representative Signature      ..........................................................................................................................................  Patient Representative Print Name and Relationship to Patient    ..................................................               ................................................  Date                                   Time    ..........................................................................................................................................  Reviewed by Signature/Title    ...................................................              ..............................................  Date                                               Time          22EPIC Rev 08/18

## 2020-06-02 NOTE — ED PROVIDER NOTES
History     Chief Complaint: Sent for MRI    HPI   Jessica Qiu is a 69 year old female on Xarelto with a history of hydrocephalus s/p shunt, DVT, PE, and nephrolithiasis who presents requesting an MRI. The patient was admitted for PE and DVT on 05/27 at [facility]. Following a heparin drip throughout her stay and LLE venogram with mechanical thrombectomy, she was discharged in good condition. She was seen again at [facility] ED for chest pressure, dizziness, and and difficulty speaking. Workup included Head CT and CT Chest PE w/contrast, which showed bilateral pulmonary emboli. Despite encouragement to be admitted for observation, the patient declined and was discharged. Follow-up MRI was recommended, and her PCP encouraged her to follow-up with neurology. She spoke with her neurologist Roldan Kohler, who instructed her to present ot the emergency department for her MRI.     She notes she has seen so many people over the last several days, and she is confused what's wrong with her. She notes episodes of abdominal warmth and fatigue are the symptoms associated with the episodes prompting her to present to the emergency department before. She mentions chronic fatigue for some time, and she endorses compliance with her Xarelto. She endorses diplopia and blurry vision for some time, no acute changes. She denies chest pain, shortness of breath, dizziness, new tingling or numbness different than her chronic neuropathy, nausea, and vomiting.  Denies any headache.      Allergies:  Simvastatin  Hmg-CoA-R inhibitors    Medications:    Allopuriol  Citalopram  Benadryl  Flonase  Xarelto    Past Medical History:    Cognitive decline  Gait disorder  Hydrocephalus  Aortic valve insufficiency  Bilateral foot drop  Deep venous thrombosis in pregnancy  Nephrolithiasis    Past Surgical History:    Breast biopsy  Kidney stone removal  Lithoptripsy  Tonsillectomy  Stent placement    Family History:    Cerebrovascular  disease (mother)  Colorectal cancer (father)    Social History:  Smoking status: Never  Alcohol use: Yes  Drug use: No  PCP: Burke Garcia  Marital Status:   [2]    Review of Systems   All other systems reviewed and are negative.      Physical Exam     Patient Vitals for the past 24 hrs:   BP Temp Temp src Pulse Resp SpO2 Weight   06/02/20 1201 119/87 98.3  F (36.8  C) Oral 85 20 97 % 88.5 kg (195 lb)     Physical Exam  General: Resting on the bed.  No apparent distress  Head: No obvious trauma to head.  Ears, Nose, Throat:  External ears normal.  Nose normal.   Eyes:  Conjunctivae clear.  Pupils are equal, round, and reactive.   Neck: Normal range of motion.  Neck supple.   CV: Regular rate and rhythm.  No murmurs.      Respiratory: Effort normal and breath sounds normal.  No wheezing or crackles.   Gastrointestinal: Soft.  No distension. There is no tenderness.  There is no rigidity, no rebound and no guarding.   Musculoskeletal: Normal range of motion.  Non tender extremities to palpations.  2+ DP pulses.  Mild lower extremity swelling.    Neuro: Alert. Moving all extremities appropriately.  Normal speech.  CN II-XII grossly intact, no pronator drift, normal finger-nose-finger, visual fields intact.  Gross muscle strength intact of the proximal and distal bilateral upper and lower extremities.  Sensation intact to light touch in all 4 extremities.    Skin: Skin is warm and dry.  No rash noted.     Emergency Department Course   Imaging:  Radiographic findings were communicated with the patient who voiced understanding of the findings.    MRI Brain w & w/o Contrast:  Pending  As read by Radiology.    Laboratory:  CBC: HGB 9.0 (L) o/w WNL (WBC 9.6 and )  BMP: Potassium 3.2 (L) o/w WNL (Creatinine 0.80)    Procedures: None.    Interventions:   Ativan 0.5 mg PO    Emergency Department Course:  Past medical records, nursing notes, and vitals reviewed.  1236: I performed an exam of the patient and  obtained history, as documented above.    1249: I spoke with the patient's daughter Elizabeth.     The patient was sent for a Brain MRI while in the emergency department, findings above.      1325: I consulted with [facility], regarding the patient's stent and any contraindications for MRI. We discussed possible need for transfer to [facility].     1346: I spoke again with [facility]. The patient will just need to have her shunt checked in the next several days.     The patient was signed out to my colleague Dr. Dallas who will follow up on pending MRI and subsequent disposition.     Impression & Plan      Medical Decision Making:  Jessica Qiu is a 69 year old female who presents with dizziness.  Vital signs reassuring.  Broad differential was pursued including not limited to acute electrolyte, metabolic, renal dysfunction, CVA, TIA, intracranial hemorrhage, mass, tumor, etc.  I reviewed patient's chart.  She has had extensive work-up and treatment last week.  She had a thrombectomy performed with good resolution of clot burden.  She was placed on oral anticoagulation has been compliant with these medications.  She presented on 5/31 for dizziness.  At that time it was recommended she be admitted for MRI given her dizziness episodes.  Per chart review EKG appeared largely unchanged.  CT head showed no evidence of  shunt malfunction or increased hydrocephalus.  CT showed bilateral small PEs but no evidence of right heart strain.  Troponin was negative.  At that point patient was offered observation and she agreed but then later changed her mind.  She discharged and today her daughter noticed on the discharge paperwork that she was recommended to have an MRI.  She spoke with her neurologist who recommended obtaining the MRI today.  With these dizziness episodes I did think an MRI was indicated to rule out CVA or TIA.  Given the MRI does affect the  shunt I spoke briefly with  neurosurgery.  They recommend the patient follow-up urgently in neurosurgery clinic, daughter helped arrange outpatient follow-up for the patient.  I spoke with daughter at length and appears the patient does have some dementia at baseline from her NPH.  She is not a very reliable historian at times.  Based on limited history did obtain the MRI, MRI was signed out pending thing to my partner.  Plan for MRI results and ambulation of the patient.  If this is all unremarkable patient can continue to follow-up with her primary doctor as indicated for work up of dizziness.  Encourage continuation of home medications.     Critical Care time:  none    Diagnosis:    ICD-10-CM    1. Dizziness  R42        Disposition: signed out to Dr Dallas    Discharge Medications:  New Prescriptions    No medications on file     I, Steven Ying, am serving as a scribe at 12:49 PM on 6/2/2020 to document services personally performed by Estefany López MD based on my observations and the provider's statements to me.     Steven Ying  6/2/2020   [facility] EMERGENCY DEPARTMENT       Estefany López MD  06/02/20 0307

## 2020-06-02 NOTE — DISCHARGE INSTRUCTIONS
Please return to the ED if you have active chest pain, shortness of breath, nausea, sweatiness, or other acute changes.  Please follow up with your PCP in the next 2-3 days.      Discharge Instructions  Dizziness (Lightheaded)  Today you were seen for dizziness.  Dizziness can be caused by many things and it can be very difficult to determine the cause of dizziness.  At this time, your provider has found no signs that your dizziness is due to a serious or life-threatening condition. However, sometimes there is a serious problem that does not show up right away, and it is important for you to follow up with your regular provider as instructed.  Generally, every Emergency Department visit should have a follow-up clinic visit with either a primary or a specialty clinic/provider. Please follow-up as instructed by your emergency provider today.      Return to the Emergency Department if:    You pass out (fainting or falling out), especially during exercise.    You develop chest pain, chest pressure or difficulty breathing.  Your feel an irregular heartbeat.  You have excessive vaginal bleeding, or blood in your stool or vomit (throw up).  You have a high fever.  Your symptoms get worse or more frequent.    If when you begin to feel dizzy or lightheaded, it is important to sit down or lay down immediately to prevent injury from falling.  If you were given a prescription for medicine here today, be sure to read all of the information (including the package insert) that comes with your prescription.  This will include important information about the medicine, its side effects, and any warnings that you need to know about.  The pharmacist who fills the prescription can provide more information and answer questions you may have about the medicine.  If you have questions or concerns that the pharmacist cannot address, please call or return to the Emergency Department.   Remember that you can always come back to the Emergency  Department if you are not able to see your regular provider in the amount of time listed above, if you get any new symptoms, or if there is anything that worries you.

## 2020-06-02 NOTE — ED TRIAGE NOTES
Patient was discharged from [facility] on Saturday after an admission for PE and DVTs.  On Sunday she was was seen in the ER for a episode of weakness.  She was to have an outpatient MRI. Today she spoke with her neurologist Roldan Kohler ([phone]) who told her to come to the ER for the MRI.  She has been feeling well yesterday and today.  ABCs intact.  Patient is alert and oriented x3.

## 2020-06-02 NOTE — ED PROVIDER NOTES
Patient signed out to my by Dr. López at 1600 pending MRI of the brain. If reassuring plan is to DC home with close outpatient neurosurgery follow up of her  shunt. MRI without any acute findings to suggest central neurologic cause of dizziness. No stroke. Patient stable for DC to home with close outpatient follow up.     Clinical Impression:    ICD-10-CM    1. Dizziness  R42        Disposition:  Discharged to home.     Christiano Dallas MD  06/02/20 5599

## 2020-06-05 ENCOUNTER — TELEPHONE (OUTPATIENT)
Dept: OTHER | Facility: CLINIC | Age: 70
End: 2020-06-05

## 2020-06-05 NOTE — TELEPHONE ENCOUNTER
Patient's , Jovany called stating that Jessica was released from [facility] ED last Saturday and that in the evenings she is experiencing slight chest pain - like a poking finger for about 10 minute duration.  This subsides over two hours.    She also experiences numbness of the hands and feet and has developed a cough.    Patient is already on anticoagulation for her bilateral PE s/p thrombectomy 5/28 for extensive LLE occlusive DVT.    Due to patient's concerning symptoms, she and her  were advised to go to the emergency room for assessment.    They were reluctant to do so and are seeking ongoing advice for how to deal with her symptoms.  Her vascular concerns are managed at this time.  I asked that Jovany contact Jessica's PCP for further guidance if they do not wish to go to the ED.    Ainsley Keating RN BSN  [facility] Vascular [facility]  [phone]

## 2020-06-08 LAB — COPATH REPORT: NORMAL

## 2020-06-09 ENCOUNTER — TELEPHONE (OUTPATIENT)
Dept: OTHER | Facility: CLINIC | Age: 70
End: 2020-06-09

## 2020-06-09 NOTE — TELEPHONE ENCOUNTER
----- Message from Essence Van PA-C sent at 6/9/2020  1:35 PM CDT -----  Noted to be positive for being a heterozygote for Factor V Leiden. Already on anticoagulation in setting of recent PE. No changes to be made at this time. Needs follow up appointment with Dr. Schreiber within the next month to review (this can be virtual).

## 2020-06-25 ENCOUNTER — TELEPHONE (OUTPATIENT)
Dept: OTHER | Facility: CLINIC | Age: 70
End: 2020-06-25

## 2020-06-29 ENCOUNTER — HOSPITAL ENCOUNTER (OUTPATIENT)
Dept: ULTRASOUND IMAGING | Facility: CLINIC | Age: 70
End: 2020-06-29
Attending: RADIOLOGY
Payer: COMMERCIAL

## 2020-06-29 ENCOUNTER — OFFICE VISIT (OUTPATIENT)
Dept: OTHER | Facility: CLINIC | Age: 70
End: 2020-06-29
Attending: RADIOLOGY
Payer: COMMERCIAL

## 2020-06-29 VITALS — RESPIRATION RATE: 16 BRPM | DIASTOLIC BLOOD PRESSURE: 69 MMHG | SYSTOLIC BLOOD PRESSURE: 110 MMHG | HEART RATE: 83 BPM

## 2020-06-29 DIAGNOSIS — I82.492 ACUTE EMBOLISM AND THROMBOSIS OF OTHER SPECIFIED DEEP VEIN OF LEFT LOWER EXTREMITY (H): ICD-10-CM

## 2020-06-29 DIAGNOSIS — R09.89 OTHER SPECIFIED SYMPTOMS AND SIGNS INVOLVING THE CIRCULATORY AND RESPIRATORY SYSTEMS: ICD-10-CM

## 2020-06-29 DIAGNOSIS — I82.409 DVT (DEEP VENOUS THROMBOSIS) (H): Primary | ICD-10-CM

## 2020-06-29 DIAGNOSIS — I82.409 DVT (DEEP VENOUS THROMBOSIS) (H): ICD-10-CM

## 2020-06-29 PROCEDURE — 93978 VASCULAR STUDY: CPT

## 2020-06-29 PROCEDURE — 93971 EXTREMITY STUDY: CPT | Mod: LT

## 2020-06-29 NOTE — PROGRESS NOTES
"Jessica Qiu is a 69 year old female who presents for:  Chief Complaint   Patient presents with     RECHECK     s/p mechanical thrombectomy, femoral, iliac, IVC done on 5/28 with Dr. Dumont.        Vitals:    Vitals:    06/29/20 1222   BP: 110/69   BP Location: Right arm   Patient Position: Chair   Cuff Size: Adult Regular   Pulse: 83   Resp: 16       BMI:  Estimated body mass index is 33.47 kg/m  as calculated from the following:    Height as of 5/27/20: 5' 4\" (1.626 m).    Weight as of 6/2/20: 195 lb (88.5 kg).    Pain Score:  Data Unavailable        Jolie Li CMA    "

## 2020-06-30 NOTE — PROGRESS NOTES
Patient Name: Jessica Qiu  Patient MRN: [id number]  Patient : 1950    HPI: This is a 69 year old female presents to clinic today accompanied by her daughter for follow up mechanical thrombectomy using ClotTriever and FloTriever device of left lower extremity and inferior vena cava done on 2020 at [facility] in Interventional Radiology.    Today, the patient is doing well.  Her residual left lower extremity swelling has resolved and she no longer has pain in that extremity.  She has been wearing her compression stockings as prescribed, however she states they are loose.  She will likely need to be measured again since now her swelling has resolved.     She currently is on Xarelto which is being managed by Dr. Schreiber.  She reports no unusual bleeding.    OBJECTIVE:   /69 (BP Location: Right arm, Patient Position: Chair, Cuff Size: Adult Regular)   Pulse 83   Resp 16     General Appearance: WDWN female in NAD  Lower Extremity: no swelling in the lower extremities.  Bilateral calf measurements are equal 43 cm.    Current Outpatient Medications   Medication     allopurinol (ZYLOPRIM) 300 MG tablet     azelastine (OPTIVAR) 0.05 % SOLN ophthalmic solution     citalopram (CELEXA) 20 MG tablet     diphenhydrAMINE (BENADRYL) 25 MG tablet     fluticasone (FLONASE) 50 MCG/ACT spray     rivaroxaban ANTICOAGULANT (XARELTO ANTICOAGULANT) 20 MG TABS tablet     rivaroxaban ANTICOAGULANT (XARELTO) 15 MG TABS tablet     No current facility-administered medications for this visit.      Patient Active Problem List   Diagnosis     Gait disorder     Cognitive decline     Nephrolithiasis     Hyperlipidemia with target LDL less than 130     Other idiopathic scoliosis, thoracolumbar region- left sided      Hydrocephalus (H)     Handicap Parking through 2018   shunt placed 3/24/2016 @ [facility] for hydrocephalus; using walker , risk for falls     Aortic valve insufficiency, unspecified etiology      Depression with anxiety     Foot drop, bilateral     Short Achilles tendon, unspecified laterality     DVT (deep vein thrombosis) in pregnancy     Imaging:   Results for orders placed or performed during the hospital encounter of 06/29/20   US Lower Extremity Venous Duplex Left    Narrative    VENOUS ULTRASOUND LEFT LEG  6/29/2020 11:39 AM     HISTORY: s/p left lower extremity thrombectomy of IVC, iliac and  femoral vein done on 5/28/2020 with Dr. Dumont.  1 mo f/u; Acute  embolism and thrombosis of other specified deep vein of left lower  extremity (H); DVT (deep venous thrombosis) (H)    COMPARISON: Venogram dated 5/28/2020    FINDINGS:  Examination of the deep veins with graded compression and  color flow Doppler with spectral wave form analysis was performed.   There is no evidence for DVT in the left lower extremity.      Impression    IMPRESSION: No evidence of deep venous thrombosis.    BESSIE BARRON MD         Assessment/Plan:  This is a pleasant 69 year old female who is doing well following mechanical thrombectomy of the inferior vena cava and left lower extremity.  Her previous symptoms have completed resolved.    We discussed her imaging that was performed today. There is no deep vein thrombosis in the left leg and the inferior vena cava and iliac veins are all open.     Plan:  Follow up with Dr. Schreiber and previously scheduled.  We no longer need to follow patient unless she has concerns.        I met with the patient for 30 minutes of which >50% of the time was used to discuss treatment options and/or coordination of care.    Dr. Estefany Dumont DO  Pager: [phone]  Interventional Radiology   Vascular [facility]  [phone]

## 2020-06-30 NOTE — PATIENT INSTRUCTIONS
Follow up with Dr. Schreiber as previously scheduled.  Wear compression stockings  Elevate legs as much as possible.  Contact us if questions or concerns.

## 2020-07-08 ENCOUNTER — VIRTUAL VISIT (OUTPATIENT)
Dept: OTHER | Facility: CLINIC | Age: 70
End: 2020-07-08
Attending: INTERNAL MEDICINE
Payer: COMMERCIAL

## 2020-07-08 DIAGNOSIS — Z86.718 HISTORY OF DEEP VENOUS THROMBOSIS: ICD-10-CM

## 2020-07-08 PROCEDURE — 99214 OFFICE O/P EST MOD 30 MIN: CPT | Mod: ZP | Performed by: INTERNAL MEDICINE

## 2020-07-08 NOTE — PROGRESS NOTES
"Jessica Qiu is a 69 year old female who is being evaluated via a billable video visit.      The patient has been notified of following:     \"This video visit will be conducted via a call between you and your physician/provider. We have found that certain health care needs can be provided without the need for an in-person physical exam.  This service lets us provide the care you need with a video conversation.  If a prescription is necessary we can send it directly to your pharmacy.  If lab work is needed we can place an order for that and you can then stop by our lab to have the test done at a later time.    Video visits are billed at different rates depending on your insurance coverage.  Please reach out to your insurance provider with any questions.    If during the course of the call the physician/provider feels a video visit is not appropriate, you will not be charged for this service.\"    Patient has given verbal consent for Video visit? Yes, [phone] (for pt's daughter, Elizabeth, she will help her mom set up the visit)      How would you like to obtain your AVS?  Mailed     Will anyone else be joining your video visit?  Yes, patient's daughter Elizabeth will be attending with her mother during the video visit.    Patient has not obtained any vitals on 7/8/20     Provider visit note:    Chief complaint:  Follow-up visit  Recent history of DVT and PE underwent mechanical thrombectomy  Doing well  Review of imaging studies  One episode of fall since hospital discharge no injury  Daughter present with the patient for this video visit    History of present illness:    This is a very pleasant 69-year-old female with history of multiple medical problems recently presented to [facility] with extensive DVT and bilateral PE transferred to the [facility] underwent successful mechanical thrombectomy and currently taking Xarelto tolerating without any problems leg swelling improved no hematuria " or bruises or bleeding issues.  She has a positive factor V Leyden mutation heterozygous type  She also underwent recently venous duplex complete resolution of the blood clot      Review of systems: Reviewed all 12 point review of systems as per HPI otherwise unremarkable    Physical exam:( minimal  physical exam done this is virtual visit)    Reviewed recent laboratory tests, imaging studies in the epic and updated chartVENOUS ULTRASOUND LEFT LEG  6/29/2020 11:39 AM      HISTORY: s/p left lower extremity thrombectomy of IVC, iliac and  femoral vein done on 5/28/2020 with Dr. Dumont.  1 mo f/u; Acute  embolism and thrombosis of other specified deep vein of left lower  extremity (H); DVT (deep venous thrombosis) (H)     COMPARISON: Venogram dated 5/28/2020     FINDINGS:  Examination of the deep veins with graded compression and  color flow Doppler with spectral wave form analysis was performed.   There is no evidence for DVT in the left lower extremity.                                                                      IMPRESSION: No evidence of deep venous thrombosis.     BESSIE BARRON MDULTRASOUND AORTA/IVC/ILIAC DUPLEX 6/29/2020 11:39 AM      HISTORY: Patient is status post left lower extremity thrombectomy as  well as IVC and iliac vein thrombectomy.     COMPARISON: Venogram dated 5/28/2020     FINDINGS: Color Doppler and spectral waveform analysis performed. The  inferior vena cava and visualized portions of the iliac veins are  patent without thrombus.                                                                      IMPRESSION: Patent inferior vena cava and visualized iliac veins.     BESSIE BARRON MD        Assessment and plan:      1.  Extensive acute occlusive thrombus extending from the distal IVC into the left common iliac, external iliac veins, common femoral vein, superficial femoral vein, popliteal vein and into the posterior tibial vein up to the level of the ankle     S/p successful  mechanical thrombectomy of the left external iliac vein, common iliac vein and IVC just above the confluence and no IVC filter placed.  There was no residual clot from popliteal vein through the IVC after the procedure  5/28/2020     2.  Bilateral multiple small pulmonary emboli    3. Heterozygous Factor 5 Leyden mutation:        For full details please see my recent hospital consult note and follow-up note  Tolerating Xarelto  Using compression stockings  Leg swelling improved  Only one episode of fall with no injuries since the hospital discharge  She underwent surveillance venous duplex on June 29, 2020 no DVT noted  Continue Xarelto 20 mg daily with dinner total 6-month duration then followed by reassess to reduce the dose to 10 mg if she tolerates  Will get d-dimer, CBC and CMP lab tests in November then virtual video visit with me  Patient's daughter was present with her and she had a lot of questions all of them were answered           3.  History of Idiopathic normal pressure hydrocephalus.  (This was diagnosed in 2016, and she previously underwent  shunt placement in 03/2016 at the [facility].  She was last seen by Neurosurgery at the [facility] in the spring of 2019 and had plans to establish with a neurosurgeon locally)     4.  Dilatation of the ascending aorta (last echocardiogram April 2018,  4.6 cm size)     She will need yearly echocardiogram follow-up, at some point dedicated CTA of chest for accurate measurements     5.  Dyslipidemia:  She is not taking any statins listed as a myalgias for simvastatin and other statins.  TLC suggested     6.  History of nephrolithiasis ( history of staghorn calculi and is on uric acid for staghorn calculi and is on chronic treatment with allopurinol)  No hematuria, if this becomes an issue then she cannot handle the anticoagulation  \     Video Visit Details    Type of Service: Video Visit    Video Start Time: 1:00 PM    Video End Time: 1:26    Originating  Location (patient location):  Home     Distant Location (provider location): Provider residence equipped with epic and dragon    Mode of Communication:  Video Conference via Reflex      This visit is being conducted as a virtual visit due to the emphasis on mitigation of the COVID-19 virus pandemic. The clinician has decided that the risk of an in-office visit outweighs the benefit for this patient.     Fco Schreiber MD, VER[facility], [facility]  Vascular medicine service

## 2020-07-08 NOTE — PATIENT INSTRUCTIONS
Continue xarelto 20 mg daily with dinner, New Rx sent to pharmacy    Avoid falls    Please go for  D dimer, CBC, CMP  blood test in November and then video virtual visit with me    You have positive factor 5 leyden mutation heterozygous type and your siblings and children should be tested for this at some point.    Use compression stockings 20-30 mm Hg thigh high, closed toes  or ACE wraps if you cant wear stockings.

## 2020-07-15 ENCOUNTER — TELEPHONE (OUTPATIENT)
Dept: OTHER | Facility: CLINIC | Age: 70
End: 2020-07-15

## 2020-07-15 NOTE — TELEPHONE ENCOUNTER
[facility]    Who is the name of the provider?:  Candie      What is the location you see this provider at?: [place]    Reason for call:  1) How long is she to wear the compression stockings?  2) Wear when exercising?    Can we leave a detailed message on this number?  YES

## 2020-07-16 NOTE — TELEPHONE ENCOUNTER
Returned Jessica's call.    Left message encouraging her to wear her compression stockings any time she is out of bed, including during exercise.    Asked that she call with any further questions.    Ainsley Keating RN BSN  [facility] Vascular [facility]  [phone]

## 2020-10-26 DIAGNOSIS — Z86.718 HISTORY OF DEEP VENOUS THROMBOSIS: ICD-10-CM

## 2020-10-26 LAB
BASOPHILS # BLD AUTO: 0.1 10E9/L (ref 0–0.2)
BASOPHILS NFR BLD AUTO: 1.3 %
D DIMER PPP FEU-MCNC: <0.3 UG/ML FEU (ref 0–0.5)
DIFFERENTIAL METHOD BLD: ABNORMAL
EOSINOPHIL # BLD AUTO: 0.2 10E9/L (ref 0–0.7)
EOSINOPHIL NFR BLD AUTO: 2.6 %
ERYTHROCYTE [DISTWIDTH] IN BLOOD BY AUTOMATED COUNT: 16.7 % (ref 10–15)
HCT VFR BLD AUTO: 43.7 % (ref 35–47)
HGB BLD-MCNC: 13.5 G/DL (ref 11.7–15.7)
IMM GRANULOCYTES # BLD: 0 10E9/L (ref 0–0.4)
IMM GRANULOCYTES NFR BLD: 0.3 %
LYMPHOCYTES # BLD AUTO: 3.2 10E9/L (ref 0.8–5.3)
LYMPHOCYTES NFR BLD AUTO: 36.7 %
MCH RBC QN AUTO: 28.3 PG (ref 26.5–33)
MCHC RBC AUTO-ENTMCNC: 30.9 G/DL (ref 31.5–36.5)
MCV RBC AUTO: 92 FL (ref 78–100)
MONOCYTES # BLD AUTO: 1 10E9/L (ref 0–1.3)
MONOCYTES NFR BLD AUTO: 11.1 %
NEUTROPHILS # BLD AUTO: 4.2 10E9/L (ref 1.6–8.3)
NEUTROPHILS NFR BLD AUTO: 48 %
NRBC # BLD AUTO: 0 10*3/UL
NRBC BLD AUTO-RTO: 0 /100
PLATELET # BLD AUTO: 293 10E9/L (ref 150–450)
RBC # BLD AUTO: 4.77 10E12/L (ref 3.8–5.2)
WBC # BLD AUTO: 8.7 10E9/L (ref 4–11)

## 2020-10-26 PROCEDURE — 85025 COMPLETE CBC W/AUTO DIFF WBC: CPT | Performed by: INTERNAL MEDICINE

## 2020-10-26 PROCEDURE — 85379 FIBRIN DEGRADATION QUANT: CPT | Performed by: INTERNAL MEDICINE

## 2020-10-26 PROCEDURE — 80053 COMPREHEN METABOLIC PANEL: CPT | Performed by: INTERNAL MEDICINE

## 2020-10-27 LAB
ALBUMIN SERPL-MCNC: 3.9 G/DL (ref 3.4–5)
ALP SERPL-CCNC: 85 U/L (ref 40–150)
ALT SERPL W P-5'-P-CCNC: 36 U/L (ref 0–50)
ANION GAP SERPL CALCULATED.3IONS-SCNC: 7 MMOL/L (ref 3–14)
AST SERPL W P-5'-P-CCNC: 27 U/L (ref 0–45)
BILIRUB SERPL-MCNC: 0.7 MG/DL (ref 0.2–1.3)
BUN SERPL-MCNC: 13 MG/DL (ref 7–30)
CALCIUM SERPL-MCNC: 9.6 MG/DL (ref 8.5–10.1)
CHLORIDE SERPL-SCNC: 108 MMOL/L (ref 94–109)
CO2 SERPL-SCNC: 26 MMOL/L (ref 20–32)
CREAT SERPL-MCNC: 0.84 MG/DL (ref 0.52–1.04)
GFR SERPL CREATININE-BSD FRML MDRD: 70 ML/MIN/{1.73_M2}
GLUCOSE SERPL-MCNC: 84 MG/DL (ref 70–99)
POTASSIUM SERPL-SCNC: 3.6 MMOL/L (ref 3.4–5.3)
PROT SERPL-MCNC: 7.8 G/DL (ref 6.8–8.8)
SODIUM SERPL-SCNC: 141 MMOL/L (ref 133–144)

## 2020-10-29 ENCOUNTER — VIRTUAL VISIT (OUTPATIENT)
Dept: OTHER | Facility: CLINIC | Age: 70
End: 2020-10-29
Attending: INTERNAL MEDICINE
Payer: COMMERCIAL

## 2020-10-29 DIAGNOSIS — Z86.711 HISTORY OF PULMONARY EMBOLISM: ICD-10-CM

## 2020-10-29 DIAGNOSIS — R26.9 GAIT DISORDER: ICD-10-CM

## 2020-10-29 DIAGNOSIS — Z86.718 HISTORY OF DEEP VENOUS THROMBOSIS: Primary | ICD-10-CM

## 2020-10-29 DIAGNOSIS — D68.51 FACTOR 5 LEIDEN MUTATION, HETEROZYGOUS (H): ICD-10-CM

## 2020-10-29 DIAGNOSIS — I77.810 MILD DILATION OF ASCENDING AORTA (H): ICD-10-CM

## 2020-10-29 PROCEDURE — 99214 OFFICE O/P EST MOD 30 MIN: CPT | Mod: 95 | Performed by: INTERNAL MEDICINE

## 2020-10-29 NOTE — PATIENT INSTRUCTIONS
1.  Your recent laboratory tests are normal and D-dimer is normal ( good !)  That means no active clot    2.  Continue Xarelto until end of November 2020 ,  then stop it, day after stopping Xarelto start enteric-coated aspirin 325 mg daily with food    3.  Go for D-dimer blood test and also echocardiogram in first week of January 2021 then followed by video visit with me

## 2020-10-29 NOTE — PROGRESS NOTES
"Jessica Qiu is a 69 year old female who is being evaluated via a billable video visit.      The patient has been notified of following:     \"This video visit will be conducted via a call between you and your physician/provider. We have found that certain health care needs can be provided without the need for an in-person physical exam.  This service lets us provide the care you need with a video conversation.  If a prescription is necessary we can send it directly to your pharmacy.  If lab work is needed we can place an order for that and you can then stop by our lab to have the test done at a later time.    Video visits are billed at different rates depending on your insurance coverage.  Please reach out to your insurance provider with any questions.    If during the course of the call the physician/provider feels a video visit is not appropriate, you will not be charged for this service.\"    Patient has given verbal consent for Video visit? Yes  How would you like to obtain your AVS? MyChart  If you are dropped from the video visit, the video invite should be resent to: [phone]  Will anyone else be joining your video visit? No      Jessica Garibay MA      Provider visit note:    Chief complaint:  Follow-up visit   history of DVT and PE underwent mechanical thrombectomy last week of may 2020  Doing well  Review of imaging studies and recent labs  One episode of fall since hospital discharge no injury  Daughter present with the patient for this  visit       History of present illness:  This is a very pleasant 69-year-old female with history of multiple medical problems initially presented to [facility] with extensive DVT and bilateral PE transferred to the [facility] underwent successful mechanical thrombectomy on may 28/2020 and currently taking Xarelto 20 mg daily  tolerating without any problems leg swelling improved no hematuria or bruises or bleeding issues.  She has a positive " factor V Leyden mutation heterozygous type  She also underwent  venous duplex  In June 2020 complete resolution of the blood clot     reviewed recent labs , D dimer negative     Review of systems: Reviewed all 12 point review of systems as per HPI otherwise unremarkable    Physical exam:( no physical exam done this is virtual visit)    Reviewed recent laboratory tests, imaging studies in the epic and updated chart    Assessment and plan:    1. Hx of  Extensive acute occlusive thrombus extending from the distal IVC into the left common iliac, external iliac veins, common femoral vein, superficial femoral vein, popliteal vein and into the posterior tibial vein up to the level of the ankle 5/28/2020     S/p successful mechanical thrombectomy of the left external iliac vein, common iliac vein and IVC just above the confluence and no IVC filter placed.  There was no residual clot from popliteal vein through the IVC after the procedure  5/28/2020     2.  Bilateral multiple small pulmonary emboli     3. Heterozygous Factor 5 Leyden mutation:        For full details please see my recent hospital consult note and follow-up note  Tolerating Xarelto continue until end of November then switch to aspirin 325 mg daily ( patient and daughter prefer aspirin due to HX of falls instead of low dose xarelto )   D dimer negative 2 days ago, repeat D dimer in first week of Jan 2021  She underwent surveillance venous duplex on June 29, 2020 no DVT noted    Patient's daughter was present with her and she had a lot of questions all of them were answered     3.  History of Idiopathic normal pressure hydrocephalus.  (This was diagnosed in 2016, and she previously underwent  shunt placement in 03/2016 at the [facility].  She was last seen by Neurosurgery at the [facility] in the spring of 2019 and had plans to establish with a neurosurgeon locally)     4.  Dilatation of the ascending aorta (last echocardiogram April 2018,  4.6 cm  size)     Repeat ECHO in jan 2021 ,  at some point dedicated CTA of chest for accurate measurements if progresses       5.  Dyslipidemia:  She is not taking any statins listed as a myalgias for simvastatin and other statins.  TLC suggested     6.  History of nephrolithiasis ( history of staghorn calculi and is on uric acid for staghorn calculi and is on chronic treatment with allopurinol)  No hematuria, if this becomes an issue then she cannot handle the anticoagulation    Video visit with me in jan 2021      This visit is being conducted as a virtual visit due to the emphasis on mitigation of the COVID-19 virus pandemic. The clinician has decided that the risk of an in-office visit outweighs the benefit for this patient.       Video-Visit Details    Type of service:  Video Visit    Video Start Time: 1:37 PM     Video End Time:  2:05 PM    Originating Location (pt. Location): Home    Distant Location (provider location):  [facility]     Platform used for Video Visit: Rebeca Schreiber MD, [facility], [facility]  Vascular Medicine

## 2020-12-31 DIAGNOSIS — Z86.718 HISTORY OF DEEP VENOUS THROMBOSIS: ICD-10-CM

## 2020-12-31 LAB — D DIMER PPP FEU-MCNC: 0.6 UG/ML FEU (ref 0–0.5)

## 2020-12-31 PROCEDURE — 36415 COLL VENOUS BLD VENIPUNCTURE: CPT | Performed by: INTERNAL MEDICINE

## 2020-12-31 PROCEDURE — 85379 FIBRIN DEGRADATION QUANT: CPT | Performed by: INTERNAL MEDICINE

## 2021-01-05 ENCOUNTER — HOSPITAL ENCOUNTER (OUTPATIENT)
Dept: CARDIOLOGY | Facility: CLINIC | Age: 71
Discharge: HOME | End: 2021-01-05
Attending: INTERNAL MEDICINE | Admitting: INTERNAL MEDICINE
Payer: COMMERCIAL

## 2021-01-05 DIAGNOSIS — Z86.711 HISTORY OF PULMONARY EMBOLISM: ICD-10-CM

## 2021-01-05 DIAGNOSIS — I77.810 MILD DILATION OF ASCENDING AORTA (H): ICD-10-CM

## 2021-01-05 PROCEDURE — 93306 TTE W/DOPPLER COMPLETE: CPT | Mod: 26 | Performed by: INTERNAL MEDICINE

## 2021-01-05 PROCEDURE — 93306 TTE W/DOPPLER COMPLETE: CPT

## 2021-01-07 ENCOUNTER — VIRTUAL VISIT (OUTPATIENT)
Dept: OTHER | Facility: CLINIC | Age: 71
End: 2021-01-07
Attending: INTERNAL MEDICINE
Payer: COMMERCIAL

## 2021-01-07 DIAGNOSIS — D68.51 FACTOR 5 LEIDEN MUTATION, HETEROZYGOUS (H): ICD-10-CM

## 2021-01-07 DIAGNOSIS — R26.9 GAIT DISORDER: ICD-10-CM

## 2021-01-07 DIAGNOSIS — I77.810 MILD DILATION OF ASCENDING AORTA (H): ICD-10-CM

## 2021-01-07 DIAGNOSIS — Z86.711 HISTORY OF PULMONARY EMBOLISM: ICD-10-CM

## 2021-01-07 DIAGNOSIS — Z86.718 HISTORY OF DEEP VENOUS THROMBOSIS: ICD-10-CM

## 2021-01-07 PROCEDURE — 99214 OFFICE O/P EST MOD 30 MIN: CPT | Mod: 95 | Performed by: INTERNAL MEDICINE

## 2021-01-07 NOTE — PROGRESS NOTES
"Jessica is a 70 year old who is being evaluated via a billable video visit.      How would you like to obtain your AVS? MyChart  If the video visit is dropped, the invitation should be resent by: Text to cell phone: [phone]  Will anyone else be joining your video visit? No        Provider visit note:    Chief complaint:  Follow-up visit review of recent labs and echocardiogram  She stopped taking Xarelto and started taking full dose aspirin 1 to 2 tablets daily  Daughter was present for this video visit  She still continues to have ongoing falls    History of present illness:  This is a very pleasant 70-year-old female with history of multiple medical problems initially presented to [facility] with extensive DVT and bilateral PE transferred to the [facility] underwent successful mechanical thrombectomy on may 28/2020 and took Xarelto 20 mg daily for 6 months and tolerated without any problems.  Her D-dimer was negative and previous ultrasound no more DVT.  Because of her fall risk discontinued Xarelto and initiated full dose aspirin daily  Her D-dimer now 0.6 which is still normal for her age  No leg swelling no chest pain or shortness of breath  She also underwent echocardiogram as delineated above normal LV function  Unchanged dilated ascending aorta size 4.4 cm    She has a positive factor V Leyden mutation heterozygous type       Review of systems: Reviewed all 12 point review of systems as per HPI otherwise unremarkable    Physical exam:( no physical exam done this is virtual visit)    Reviewed recent laboratory tests, imaging studies in the epic and updated chart     ECHO  1/5/2021    \"Interpretation Summary  1. Left ventricular systolic function is normal. The visual ejection fraction  is estimated at 60-65%.  2. No regional wall motion abnormalities noted.  3. The right ventricle is normal in structure, function and size.  4. The aortic valve is trileaflet with aortic valve " "sclerosis. There is  moderately severe (3+) aortic regurgitation.  5. Mild dilatation of the aortic root (sinus of valsalva) 3.5 cm and  moderately dilated ascending aorta, 4.4 cm.  6. In comparison with the prior study, aorta mesuremetns are not significantly  changed, aortic regurgitation has increased.\"      Assessment and plan:    1. Hx of  Extensive acute occlusive thrombus extending from the distal IVC into the left common iliac, external iliac veins, common femoral vein, superficial femoral vein, popliteal vein and into the posterior tibial vein up to the level of the ankle 5/28/2020     S/p successful mechanical thrombectomy of the left external iliac vein, common iliac vein and IVC just above the confluence and no IVC filter placed.  There was no residual clot from popliteal vein through the IVC after the procedure  5/28/2020     2.  Bilateral multiple small pulmonary emboli     3. Heterozygous Factor 5 Leyden mutation:        For full details please see my recent hospital consult note and recent clinic follow-up note    Patient's daughter was present with her and she had a lot of questions all of them were answered    She stopped Xarelto and a month later underwent D-dimer test which was 0.6 age-adjusted range was normal but when she was taking Xarelto it was less than 0.3.  She has a frequent falls  Currently taking full dose aspirin 1 to 2 tablets daily, continue the same  She has no leg swelling or shortness of breath    Will repeat D-dimer test in 4 or 5 weeks then decide if she needs low-dose Xarelto 10 mg a day versus continuation of aspirin  If she develops any leg swelling or shortness of breath call our clinic or go to the emergency room    Virtual video visit with me after D-dimer testing 5 or 6 weeks     4.  History of Idiopathic normal pressure hydrocephalus.  (This was diagnosed in 2016, and she previously underwent  shunt placement in 03/2016 at the [facility].  She was last seen by " Neurosurgery at the [facility] in the spring of 2019 and had plans to establish with a neurosurgeon locally)  Still ongoing frequent falls     5.  Dilatation of the ascending aorta (last echocardiogram April 2018,  4.6 cm size)     Repeat ECHO in jan 2021 ,  read as 4.4 cm at some point dedicated CTA of chest for accurate measurements if progresses  For now will plan to proceed with yearly echocardiogram        6.  Dyslipidemia:  She is not taking any statins listed as a myalgias for simvastatin and other statins.  TLC suggested     7.  History of nephrolithiasis ( history of staghorn calculi and is on uric acid for staghorn calculi and is on chronic treatment with allopurinol)  No hematuria, if this becomes an issue then she cannot handle the anticoagulation in the future    Video Visit Details    Type of Service: Video Visit    Video Start Time: 3:00 PM    Total patient care time spent 30 minutes, including review of recent ECHO, labs , previous notes and documentation etc  Patient and daughter had lot of questions all of them answered.    Originating Location (patient location): Home ( daughter was present with this visit)    Distant Location (provider location): [facility]    Mode of Communication:  Video Conference via myRete    This visit is being conducted as a virtual visit due to the emphasis on mitigation of the COVID-19 virus pandemic. The clinician has decided that the risk of an in-office visit outweighs the benefit for this patient.     Fco Schreiber MD, [facility], [facility]  Vascular medicine

## 2021-01-07 NOTE — PATIENT INSTRUCTIONS
1.  Continue aspirin 325 mg daily with food    2.  We will repeat D-dimer lab test in 4 to 5 weeks and decide if we can continue aspirin or low-dose Xarelto 10 mg daily at that  Time  If leg swelling gets worse or shortness of breath either call our office or go to ER    3.  Avoid falls    4.  Use compression stockings and elevate the legs when able    5.  Your recent echocardiogram looks good and unchanged and we will repeat echocardiogram in 1 year    6.video visit few days after lab test in 5 weeks

## 2021-01-11 ENCOUNTER — TELEPHONE (OUTPATIENT)
Dept: OTHER | Facility: CLINIC | Age: 71
End: 2021-01-11

## 2021-01-11 NOTE — TELEPHONE ENCOUNTER
Follow up to 1/7/21    Please arrange for:      D-dimer (non-fasting lab) around 2/11/21    Virtual visit about 3 days later.    Ainsley Keating RN BSN  [facility] Vascular [facility]  [phone]

## 2021-01-12 NOTE — TELEPHONE ENCOUNTER
"LM on mobile # for patient to call us back to schedule:    Follow up to 1/7/21       D-dimer (non-fasting lab) around 2/11/21    Virtual visit about 3 days later.    Please verify home # - did not leave a message as it said \"Eli Morley\" on it.     "

## 2021-02-08 ENCOUNTER — HOSPITAL ENCOUNTER (OUTPATIENT)
Dept: LAB | Facility: CLINIC | Age: 71
End: 2021-02-08
Attending: INTERNAL MEDICINE
Payer: COMMERCIAL

## 2021-02-08 ENCOUNTER — OFFICE VISIT (OUTPATIENT)
Dept: OTHER | Facility: CLINIC | Age: 71
End: 2021-02-08
Attending: INTERNAL MEDICINE
Payer: COMMERCIAL

## 2021-02-08 ENCOUNTER — HOSPITAL ENCOUNTER (OUTPATIENT)
Dept: ULTRASOUND IMAGING | Facility: CLINIC | Age: 71
End: 2021-02-08
Attending: INTERNAL MEDICINE
Payer: COMMERCIAL

## 2021-02-08 ENCOUNTER — TELEPHONE (OUTPATIENT)
Dept: OTHER | Facility: CLINIC | Age: 71
End: 2021-02-08

## 2021-02-08 VITALS
DIASTOLIC BLOOD PRESSURE: 81 MMHG | BODY MASS INDEX: 30.9 KG/M2 | TEMPERATURE: 97 F | OXYGEN SATURATION: 97 % | SYSTOLIC BLOOD PRESSURE: 151 MMHG | WEIGHT: 180 LBS | HEART RATE: 62 BPM

## 2021-02-08 DIAGNOSIS — M79.89 SWELLING OF LIMB: ICD-10-CM

## 2021-02-08 DIAGNOSIS — I82.409 DEEP VEIN THROMBOSIS (DVT) OF LOWER EXTREMITY, UNSPECIFIED CHRONICITY, UNSPECIFIED LATERALITY, UNSPECIFIED VEIN (H): Primary | ICD-10-CM

## 2021-02-08 DIAGNOSIS — Z86.711 HISTORY OF PULMONARY EMBOLISM: ICD-10-CM

## 2021-02-08 DIAGNOSIS — I82.409 DVT (DEEP VENOUS THROMBOSIS) (H): Primary | ICD-10-CM

## 2021-02-08 DIAGNOSIS — Z86.718 HISTORY OF DEEP VENOUS THROMBOSIS: ICD-10-CM

## 2021-02-08 LAB — D DIMER PPP FEU-MCNC: 1.8 UG/ML FEU (ref 0–0.5)

## 2021-02-08 PROCEDURE — 93970 EXTREMITY STUDY: CPT

## 2021-02-08 PROCEDURE — G0463 HOSPITAL OUTPT CLINIC VISIT: HCPCS

## 2021-02-08 PROCEDURE — 99214 OFFICE O/P EST MOD 30 MIN: CPT | Performed by: PHYSICIAN ASSISTANT

## 2021-02-08 PROCEDURE — 85379 FIBRIN DEGRADATION QUANT: CPT | Performed by: INTERNAL MEDICINE

## 2021-02-08 NOTE — TELEPHONE ENCOUNTER
[facility]UL[facility]    Who is the name of the provider?:  Candie      What is the location you see this provider at?: [place]     Reason for call:  Hx of DVT.  Was told to stop blood thinner and call if legs swell.  Having bilateral leg swelling, redness and pain.    Can we leave a detailed message on this number?  YES

## 2021-02-08 NOTE — PROGRESS NOTES
"Jessica Qiu is a 70 year old female who presents for:  Chief Complaint   Patient presents with     RECHECK        Vitals:    Vitals:    02/08/21 1344   BP: (!) 151/81   BP Location: Left arm   Patient Position: Chair   Cuff Size: Adult Large   Pulse: 62   Temp: 97  F (36.1  C)   TempSrc: Temporal   SpO2: 97%   Weight: 180 lb (81.6 kg)       BMI:  Estimated body mass index is 30.9 kg/m  as calculated from the following:    Height as of 5/27/20: 5' 4\" (1.626 m).    Weight as of this encounter: 180 lb (81.6 kg).    Pain Score:  Data Unavailable        Jessica Garibay MA    "

## 2021-02-08 NOTE — PATIENT INSTRUCTIONS
1. Restart your Xarelto 20 mg daily.     2. In one month you will need a D.dimer drawn again and then see Dr. Schreiber 1-2 days later. Our schedulers will contact you to schedule. If you don't hear from them within a week, please call [phone] to schedule.     3. Call us with any questions or concerns.

## 2021-02-08 NOTE — CONFIDENTIAL NOTE
1/7/21 Virtual Visit: She stopped Xarelto and a month later underwent D-dimer test which was 0.6 age-adjusted range was normal but when she was taking Xarelto it was less than 0.3.    Currently taking full dose aspirin 1 to 2 tablets daily    She is scheduled for follow up to her 1/7/21 virtual visit      2/11/2021 d-dimer scheduled    2/16/2021 virtual visit with Dr. Schreiber      Arranged for:  12:40 02/08/21 lab arranged for d-dimer.  BLE Venous US immediately following at [facility].  Patient will see Essence Van PA-C if results are positive; she will follow up with Dr. Schreiber as scheduled if negative.    Ainsley Keating RN BSN  [facility]  [phone]

## 2021-02-08 NOTE — PROGRESS NOTES
[facility] VASCULAR [facility]  VASCULAR MEDICINE     FOLLOW-UP VISIT      PRIMARY HEALTH CARE PROVIDER:  Burke Garcia    REASON FOR VISIT:  Increased d.dimer in setting of stopping anticoagulation after 6 months of treatment for DVT/PE      HPI: Jessica Qiu is a 70 year old very pleasant female with a history of Factor 5 leiden mutation heterozygous type admitted in 5/2020 with extensive acute occlusive thrombus extending from the distal IVC into the left common iliac, external iliac veins, common femoral vein, superficial femoral vein, popliteal vein and into the posterior tibial vein up to the level of the ankle as well as multiple small pulmonary emboli 5/28/2020. She underwent successful mechanical thrombectomy of the left external iliac vein, common iliac vein and IVC just above the confluence 5/28/20. No IVC filter was placed.  There was no residual clot from the popliteal vein through the IVC after the procedure. She completed 6 months of anticoagulation with Xarelto and after having a normal D.dimer of <0.3 she stopped it at Thanksgiving time. Repeat D.dimer done 2 months later was just mildly elevated at 0.6, but this could still be considered normal if age adjustments made. She continued to remain off it.     This past week she began to notice increased bilateral lower extremity edema. She called in this morning and was advised to get a D.dimer drawn and bilateral venous duplex undertaken. Her D.dimer has since returned even more elevated at 1.8 and her venous duplex was negative for DVT.     She states she is otherwise feeling well. She denies any chest pain or shortness of breath. She has been wearing her compression stockings daily.         PAST MEDICAL HISTORY  Cognitive decline  Gait disorder  Hydrocephalus  Aortic valve insufficiency  Bilateral foot drop  Deep venous thrombosis and PE  Nephrolithiasis      CURRENT MEDICATIONS       allopurinol (ZYLOPRIM) 300 MG tablet, Take 1  tablet (300 mg) by mouth daily (Patient taking differently: Take 300 mg by mouth daily as needed )       azelastine (OPTIVAR) 0.05 % SOLN ophthalmic solution, Apply 1 drop to eye 2 times daily (Patient taking differently: Apply 1 drop to eye 2 times daily as needed (allergies) )       citalopram (CELEXA) 20 MG tablet, TAKE 1 TABLET BY MOUTH ONE TIME DAILY (Patient taking differently: Take 20 mg by mouth every morning )       diphenhydrAMINE (BENADRYL) 25 MG tablet, Take 25 mg by mouth 2 times daily as needed for allergies        fluticasone (FLONASE) 50 MCG/ACT spray, Spray 1-2 sprays into both nostrils daily (Patient taking differently: Spray 1-2 sprays into both nostrils daily as needed )    No current facility-administered medications on file prior to visit.       PAST SURGICAL HISTORY:  Past Surgical History:   Procedure Laterality Date     BREAST SURGERY      breast biopsy     GENITOURINARY SURGERY      kidney stones     IR LOWER EXTREMITY VENOGRAM LEFT  5/28/2020     LITHOTRIPSY  2010    uric acid stone     TONSILLECTOMY      childhood       ALLERGIES     Allergies   Allergen Reactions     Simvastatin Muscle Pain (Myalgia)     extreme muscle and joint pains     Hmg-Coa-R Inhibitors      Muscle pain and stiffness     Pollen Extract      Pt. Has hayfever       FAMILY HISTORY  Family History   Problem Relation Age of Onset     Cerebrovascular Disease Mother 38        brain aneurysm     Cancer - colorectal Father 82       SOCIAL HISTORY  Social History     Socioeconomic History     Marital status:      Spouse name: Not on file     Number of children: Not on file     Years of education: Not on file     Highest education level: Not on file   Occupational History     Not on file   Social Needs     Financial resource strain: Not on file     Food insecurity     Worry: Not on file     Inability: Not on file     Transportation needs     Medical: Not on file     Non-medical: Not on file   Tobacco Use     Smoking  status: Never Smoker     Smokeless tobacco: Never Used   Substance and Sexual Activity     Alcohol use: Yes     Alcohol/week: 0.0 standard drinks     Drug use: No     Sexual activity: Yes     Partners: Male   Lifestyle     Physical activity     Days per week: Not on file     Minutes per session: Not on file     Stress: Not on file   Relationships     Social connections     Talks on phone: Not on file     Gets together: Not on file     Attends [employer] service: Not on file     Active member of club or organization: Not on file     Attends meetings of clubs or organizations: Not on file     Relationship status: Not on file     Intimate partner violence     Fear of current or ex partner: Not on file     Emotionally abused: Not on file     Physically abused: Not on file     Forced sexual activity: Not on file   Other Topics Concern     Parent/sibling w/ CABG, MI or angioplasty before 65F 55M? No   Social History Narrative     Not on file       ROS:   General: No change in weight, sleep or appetite.  Normal energy.  No fever or chills  Eyes: Negative for vision changes or eye problems  ENT: No problems with ears, nose or throat.  No difficulty swallowing.  Resp: No coughing, wheezing or shortness of breath  CV: No chest pains or palpitations  GI: No nausea, vomiting,  heartburn, abdominal pain, diarrhea, constipation or change in bowel habits  : No urinary frequency or dysuria, bladder or kidney problems  Musculoskeletal: No significant muscle or joint pains  Neurologic: No headaches, numbness, tingling, weakness, problems with balance or coordination  Psychiatric: No problems with anxiety, depression or mental health  Heme/immune/allergy: No history of bleeding or anemia.  No allergies or immune system problems. + DVT  Endocrine: No history of thyroid disease, diabetes or other endocrine disorders  Skin: No rashes,worrisome lesions or skin problems  Vascular:  No claudication, lifestyle limiting or otherwise; no  ischemic rest pain; no non-healing ulcers. No weakness, No loss of sensation        EXAM:  BP (!) 151/81 (BP Location: Left arm, Patient Position: Chair, Cuff Size: Adult Large)   Pulse 62   Temp 97  F (36.1  C) (Temporal)   Wt 81.6 kg (180 lb)   SpO2 97%   BMI 30.90 kg/m    In general, the patient is a pleasant female in no apparent distress.    HEENT: NC/AT.  PERRLA.  EOMI.  Sclerae white, not injected.  Nares clear.  Pharynx without erythema or exudate.  Dentition intact.    Neck: No adenopathy.   Heart: RRR. Normal S1, S2 splits physiologically. No murmur, rub, click, or gallop.    Lungs: CTA.  No ronchi, wheezes, rales.   Abdomen: Soft, nontender, nondistended.  Extremities: No clubbing, cyanosis.  No wounds. Trace bilateral lower extremity edema.       Labs:  D. Dimer today 1.8 (was 0.6 on 12/31/21 and <0.3 on 10/26/20)    Procedures:   VENOUS ULTRASOUND BILATERAL LEG(S)  2/8/2021 1:27 PM      HISTORY: Swelling of limb.     COMPARISON: None.     FINDINGS: Examination of the deep veins with graded compression and  color flow Doppler with spectral wave form analysis was performed.  Images show no evidence of thrombus in the bilateral external iliac,  common femoral vein, femoral vein, popliteal vein or calf veins.                                                                      IMPRESSION: No deep vein thrombosis in either lower extremity.      Assessment and Plan:   1. Hx of extensive acute occlusive thrombus extending from the distal IVC into the left common iliac, external iliac veins, common femoral vein, superficial femoral vein, popliteal vein and into the posterior tibial vein up to the level of the ankle 5/28/2020     S/p successful mechanical thrombectomy of the left external iliac vein, common iliac vein and IVC just above the confluence and no IVC filter placed.  There was no residual clot from popliteal vein through the IVC after the procedure  5/28/2020     2.  Bilateral multiple small  pulmonary emboli     3. Heterozygous Factor 5 Leyden mutation     -She stopped Xarelto in 11/2020 after taking it 6 months and having a normal D.dimer of <0.3.   -On repeat testing on 12/31/20 D. Dimer increased to 0.6 - still normal for age adjustment  -Currently taking full dose aspirin  -Increased bilateral leg swelling now for past week with negative venous duplex but D.dimer elevated to 1.8.     Plan:   -Restart Xarelto 20 mg daily.   -Repeat a D.dimer in one month and follow-up with Dr. Schreiber 1-2 days later to determine long-term plan in regards to anticoagulation.      4.  History of Idiopathic normal pressure hydrocephalus.      This was diagnosed in 2016, and she previously underwent  shunt placement in 03/2016 at the [facility].  She was last seen by Neurosurgery at the [facility] in the spring of 2019 and had plans to establish with a neurosurgeon locally). She did have troubles with falls in the past but now is much better with using a walker.           Essence Van PA-C

## 2021-02-09 ENCOUNTER — TELEPHONE (OUTPATIENT)
Dept: OTHER | Facility: CLINIC | Age: 71
End: 2021-02-09

## 2021-02-09 NOTE — TELEPHONE ENCOUNTER
Follow up to 2/8/21 with Essence Van PA-C.    Please arrange for:      D-dimer (non-fasting) around 3/9/21    In clinic visit 2-3 days later.    Ainsley Keating RN BSN  [facility]  [phone]

## 2021-02-10 NOTE — TELEPHONE ENCOUNTER
Patient has an Virtual appointment with Dr Schreiber on 02/16/21. We will wait until after that appointment before we schedule any new appointments.

## 2021-02-16 ENCOUNTER — VIRTUAL VISIT (OUTPATIENT)
Dept: SURGERY | Facility: CLINIC | Age: 71
End: 2021-02-16
Attending: INTERNAL MEDICINE
Payer: COMMERCIAL

## 2021-02-16 VITALS — WEIGHT: 180 LBS | HEIGHT: 64 IN | BODY MASS INDEX: 30.73 KG/M2

## 2021-02-16 DIAGNOSIS — D68.51 FACTOR 5 LEIDEN MUTATION, HETEROZYGOUS (H): ICD-10-CM

## 2021-02-16 DIAGNOSIS — I82.512 CHRONIC DEEP VEIN THROMBOSIS (DVT) OF FEMORAL VEIN OF LEFT LOWER EXTREMITY (H): Primary | ICD-10-CM

## 2021-02-16 DIAGNOSIS — Z86.711 HISTORY OF PULMONARY EMBOLISM: ICD-10-CM

## 2021-02-16 DIAGNOSIS — I77.810 AORTIC ROOT DILATATION (H): ICD-10-CM

## 2021-02-16 DIAGNOSIS — N20.0 NEPHROLITHIASIS: ICD-10-CM

## 2021-02-16 DIAGNOSIS — E78.5 DYSLIPIDEMIA, GOAL LDL BELOW 70: ICD-10-CM

## 2021-02-16 DIAGNOSIS — I35.1 AORTIC VALVE INSUFFICIENCY, ETIOLOGY OF CARDIAC VALVE DISEASE UNSPECIFIED: ICD-10-CM

## 2021-02-16 PROCEDURE — 99214 OFFICE O/P EST MOD 30 MIN: CPT | Mod: 95 | Performed by: INTERNAL MEDICINE

## 2021-02-16 ASSESSMENT — MIFFLIN-ST. JEOR: SCORE: 1316.47

## 2021-02-16 NOTE — PATIENT INSTRUCTIONS
1. D dimer elevated  1.6 now and before 0.6 <-0.3 , ok to take  xarelto 20 mg daily for now and repeat D dimer in 6 weeks then plan for decrease xarelto dose to 10 mg if D dimer normal. Avoid aspirin while taking xarelto    Leg Venous duplex no clot ( good !)     2. Echo worsened Aortic regurgitation but ascending aortic aneurysm looks same at 4.6 cm ,. Please see your primary  cardiologist Mejia Dumont at [facility]     3. Avoid falls , video visit with me in 6- 8 weeks

## 2021-02-16 NOTE — PROGRESS NOTES
"Provider visit note:    Chief complaint:    Follow-up visit  No more falls since last visit  Off of anticoagulation but her D-dimer progressively worsened and restarted Xarelto 20 mg daily and tolerating    History of present illness:    This is a very pleasant 70-year-old female with history of multiple medical problems initially presented to [facility] with extensive DVT and bilateral PE transferred to the [facility] underwent successful mechanical thrombectomy on may 28/2020 and took Xarelto 20 mg daily for 6 months and tolerated without any problems.  Her D-dimer was negative and previous ultrasound no more DVT.  Because of her fall risk discontinued Xarelto and initiated full dose aspirin daily  Her D-dimer progressively increased 0.6 before and now 1.8 on February 8, 2021 , restarted Xarelto 20 mg daily tolerating  No leg swelling no chest pain or shortness of breath  She also underwent echocardiogram as delineated below normal LV function  Unchanged dilated ascending aorta size 4.4 cm  Worsened aortic regurgitation 3+    She has a positive factor V Leyden mutation heterozygous type       Review of systems: Reviewed all 12 point review of systems as per HPI otherwise unremarkable    Physical exam:( no physical exam done this is virtual visit)    Reviewed recent laboratory tests, imaging studies in the epic and updated chart    ECHO 1/5/2021  \"Interpretation Summary     1. Left ventricular systolic function is normal. The visual ejection fraction  is estimated at 60-65%.  2. No regional wall motion abnormalities noted.  3. The right ventricle is normal in structure, function and size.  4. The aortic valve is trileaflet with aortic valve sclerosis. There is  moderately severe (3+) aortic regurgitation.  5. Mild dilatation of the aortic root (sinus of valsalva) 3.5 cm and  moderately dilated ascending aorta, 4.4 cm.  6. In comparison with the prior study, aorta mesuremetns are not " "significantly  changed, aortic regurgitation has increased.\"  ________________________________________    VENOUS ULTRASOUND BILATERAL LEG(S)  2/8/2021 1:27 PM      HISTORY: Swelling of limb.     COMPARISON: None.     FINDINGS: Examination of the deep veins with graded compression and  color flow Doppler with spectral wave form analysis was performed.  Images show no evidence of thrombus in the bilateral external iliac,  common femoral vein, femoral vein, popliteal vein or calf veins.                                                                      IMPRESSION: No deep vein thrombosis in either lower extremity.       MASON KENT,       Assessment and plan:  1. Hx of  Extensive acute occlusive thrombus extending from the distal IVC into the left common iliac, external iliac veins, common femoral vein, superficial femoral vein, popliteal vein and into the posterior tibial vein up to the level of the ankle 5/28/2020     S/p successful mechanical thrombectomy of the left external iliac vein, common iliac vein and IVC just above the confluence and no IVC filter placed.  There was no residual clot from popliteal vein through the IVC after the procedure  5/28/2020     2.  Bilateral multiple small pulmonary emboli     3. Heterozygous Factor 5 Leyden mutation:        For full details please see my recent hospital consult note and recent clinic follow-up note        She stopped Xarelto and a month later underwent D-dimer test which was 0.6 age-adjusted range was normal but when she was taking Xarelto it was less than 0.3.  Now D-dimer 1.8  No more falls since last visit and she was initiated Xarelto 20 mg daily, continue same  Stop aspirin  Repeat D-dimer testing few weeks if it is normal decrease the Xarelto dose to 10 mg daily and maintain       4.  History of Idiopathic normal pressure hydrocephalus.  (This was diagnosed in 2016, and she previously underwent  shunt placement in 03/2016 at the [facility].  She " was last seen by Neurosurgery at the [facility] in the spring of 2019 and had plans to establish with a neurosurgeon locally)  Still ongoing frequent falls     5.  Dilatation of the ascending aorta (last echocardiogram April 2018,  4.6 cm size)  6. Aortic regurgitation 3 plus worse than before      Recent echocardiogram same as before 4.4 cm at some point dedicated CTA of chest for accurate measurements if progresses  Suggested patient to see her primary cardiologist for worsening aortic regurgitation        7.  Dyslipidemia:  She is not taking any statins listed as a myalgias for simvastatin and other statins.  TLC suggested     8.  History of nephrolithiasis ( history of staghorn calculi and is on uric acid for staghorn calculi and is on chronic treatment with allopurinol)  No hematuria, if this becomes an issue then she cannot handle the anticoagulation in the future         Video Visit Details    Type of Service: Video Visit    Video Start Time: 10:05 AM  Total visit time spent more than  30 minutes , reviewed recent imaging studies, labs and previous evaluation plus documentation etc.      Originating Location (patient location): Home     Distant Location (provider location): [facility]     Mode of Communication:  Video Conference via Rev      This visit is being conducted as a virtual visit due to the emphasis on mitigation of the COVID-19 virus pandemic. The clinician has decided that the risk of an in-office visit outweighs the benefit for this patient.     Fco Schreiber MD, VERNELL, [facility]  Vascular medicine service

## 2021-02-17 ENCOUNTER — TELEPHONE (OUTPATIENT)
Dept: OTHER | Facility: CLINIC | Age: 71
End: 2021-02-17

## 2021-02-17 NOTE — TELEPHONE ENCOUNTER
follow-up to 2/16/21    Please arrange for:      D-dimer (non-fasting in six weeks (around 3/30/21)    Virtual visit 3+ days later.    Ainsley Keating RN BSN  [facility]  [phone]

## 2021-02-17 NOTE — PROGRESS NOTES
AVS mailed to patient per Dr. Schreiber's instruction.  Ainsley Keating RN BSN  [facility]  [phone]

## 2021-02-23 DIAGNOSIS — Z12.31 VISIT FOR SCREENING MAMMOGRAM: ICD-10-CM

## 2021-02-23 PROCEDURE — 77067 SCR MAMMO BI INCL CAD: CPT | Mod: TC | Performed by: RADIOLOGY

## 2021-02-26 NOTE — PROGRESS NOTES
CARDIOLOGY VISIT    REASON FOR VISIT: dilated aorta, AI    SUBJECTIVE:  70-year-old female seen for f/u of aortic insufficiency and dilated ascending aorta.   She has factor V leiden with LLE DVT and PE 5/2020.    Echocardiogram May 2008 at [facility] showed preserved ejection fraction, trace aortic insufficiency, positive bubble study, aorta 4.75 cm.  Thoracic MRA showed ascending aorta 4.6 x 4.3 cm, arch 2.7 cm, and descending aorta 2.5 cm.      Echo 2/2017 showed normal left ventricular size, ejection fraction 60%, trileaflet aortic valve, 2+ AI, aorta 4.6 cm.       Echo April 2018 showed EF 60%, normal RV, mild to moderate aortic insufficiency, ascending aorta 4.6 cm.     Echo January 2021 showed EF 60%, normal RV, 3+ AI, aorta 4.4 cm.     Overall she has been doing about the same.  She has some mild dyspnea with exertion.  She can walk up to 1 mile with her walker at a fairly slow pace.  She does complain of some generalized fatigue.  She thinks she has some knee edema, but no ankle edema.  She denies chest pain or syncope.    MEDICATIONS:  Current Outpatient Medications   Medication     allopurinol (ZYLOPRIM) 300 MG tablet     azelastine (OPTIVAR) 0.05 % SOLN ophthalmic solution     citalopram (CELEXA) 20 MG tablet     diphenhydrAMINE (BENADRYL) 25 MG tablet     fluticasone (FLONASE) 50 MCG/ACT spray     rivaroxaban ANTICOAGULANT (XARELTO ANTICOAGULANT) 20 MG TABS tablet     No current facility-administered medications for this visit.        ALLERGIES:  Allergies   Allergen Reactions     Simvastatin Muscle Pain (Myalgia)     extreme muscle and joint pains     Hmg-Coa-R Inhibitors      Muscle pain and stiffness     Pollen Extract      Pt. Has hayfever       REVIEW OF SYSTEMS:  Constitutional:  No weight loss, fever, chills, weakness or fatigue.  HEENT:  Eyes:  No visual loss, blurred vision, double vision or yellow sclerae. No hearing loss, sneezing, congestion, runny nose or sore throat.  Skin:  No rash or  "itching.  Cardiovascular: per HPI  Respiratory: per HPI  GI:  No anorexia, nausea, vomiting or diarrhea. No abdominal pain or blood.  :  No dysurea, hematuria  Neurologic:  No headache, dizziness, syncope, paralysis, ataxia, numbness or tingling in the extremities. No change in bowel or bladder control.  Musculoskeletal:  No muscle, back pain, joint pain or stiffness.  Hematologic:  No anemia, bleeding or bruising.  Lymphatics:  No enlarged nodes. No history of splenectomy.  Psychiatric:  No history of depression or anxiety.  Endocrine:  No reports of sweating, cold or heat intolerance. No polyuria or polydipsia.  Allergies:  No history of asthma, hives, eczema or rhinitis.    PHYSICAL EXAM:  /78 (BP Location: Right arm, Patient Position: Sitting, Cuff Size: Adult Large)   Pulse 67   Ht 1.613 m (5' 3.5\")   Wt 85 kg (187 lb 6.4 oz)   LMP  (LMP Unknown)   Breastfeeding No   BMI 32.68 kg/m      Constitutional: awake, alert, no distress  Eyes: PERRL, sclera nonicteric  ENT: trachea midline  Respiratory: Lungs clear  Cardiovascular: Regular rate and rhythm, 2/6 diastolic murmur at the upper sternal border, no ankle edema  GI: nondistended, nontender, bowel sounds present  Lymph/Hematologic: no lymphadenopathy  Skin: dry, no rash  Musculoskeletal: good muscle tone, strength 5/5 in upper and lower extremities  Neurologic: no focal deficits  Neuropsychiatric: appropriate affact    DATA:  Lab: October 2020: Potassium 3.6, creatinine 0.8   Recent Labs   Lab Test 05/29/20  0856 01/27/17  0945 01/15/15  1018   CHOL 199 259* 270*   HDL 55 59 81   * 157* 145*   TRIG 197* 217* 219*   CHOLHDLRATIO  --   --  3.3     ASSESSMENT:  70-year-old female seen for aortic insufficiency and dilated aorta.  Overall she is doing well.  Aorta has been quite stable at least since 2008 in the 4.6 range.  The aortic insufficiency probably is progressed some, it is in the moderate plus range.  She has no concerning cardiac " symptoms.    RECOMMENDATIONS:  1.  Aortic insufficiency, moderate with dilated ascending aorta  -Repeat echo in 1 year    Follow-up in 1 year with JOSE after echo.    Demarcus Dumont MD  Cardiology - [facility]  Pager:  [phone]  Text Page  March 1, 2021

## 2021-03-01 ENCOUNTER — OFFICE VISIT (OUTPATIENT)
Dept: CARDIOLOGY | Facility: CLINIC | Age: 71
End: 2021-03-01
Payer: COMMERCIAL

## 2021-03-01 VITALS
SYSTOLIC BLOOD PRESSURE: 120 MMHG | DIASTOLIC BLOOD PRESSURE: 78 MMHG | WEIGHT: 187.4 LBS | BODY MASS INDEX: 31.99 KG/M2 | HEIGHT: 64 IN | HEART RATE: 67 BPM

## 2021-03-01 DIAGNOSIS — I35.1 NONRHEUMATIC AORTIC VALVE INSUFFICIENCY: Primary | ICD-10-CM

## 2021-03-01 DIAGNOSIS — I77.810 ASCENDING AORTA DILATION (H): ICD-10-CM

## 2021-03-01 PROCEDURE — 99214 OFFICE O/P EST MOD 30 MIN: CPT | Performed by: INTERNAL MEDICINE

## 2021-03-01 ASSESSMENT — MIFFLIN-ST. JEOR: SCORE: 1342.1

## 2021-03-01 NOTE — LETTER
3/1/2021    Boris K Gerber, MD [facility] [address] [place] [address]    RE: Jessica Qiu       Dear Colleague,    I had the pleasure of seeing Jessica GABBY Qiu in the [facility].    CARDIOLOGY VISIT    REASON FOR VISIT: dilated aorta, AI    SUBJECTIVE:  70-year-old female seen for f/u of aortic insufficiency and dilated ascending aorta.   She has factor V leiden with LLE DVT and PE 5/2020.    Echocardiogram May 2008 at [facility] showed preserved ejection fraction, trace aortic insufficiency, positive bubble study, aorta 4.75 cm.  Thoracic MRA showed ascending aorta 4.6 x 4.3 cm, arch 2.7 cm, and descending aorta 2.5 cm.      Echo 2/2017 showed normal left ventricular size, ejection fraction 60%, trileaflet aortic valve, 2+ AI, aorta 4.6 cm.       Echo April 2018 showed EF 60%, normal RV, mild to moderate aortic insufficiency, ascending aorta 4.6 cm.     Echo January 2021 showed EF 60%, normal RV, 3+ AI, aorta 4.4 cm.     Overall she has been doing about the same.  She has some mild dyspnea with exertion.  She can walk up to 1 mile with her walker at a fairly slow pace.  She does complain of some generalized fatigue.  She thinks she has some knee edema, but no ankle edema.  She denies chest pain or syncope.    MEDICATIONS:  Current Outpatient Medications   Medication     allopurinol (ZYLOPRIM) 300 MG tablet     azelastine (OPTIVAR) 0.05 % SOLN ophthalmic solution     citalopram (CELEXA) 20 MG tablet     diphenhydrAMINE (BENADRYL) 25 MG tablet     fluticasone (FLONASE) 50 MCG/ACT spray     rivaroxaban ANTICOAGULANT (XARELTO ANTICOAGULANT) 20 MG TABS tablet     No current facility-administered medications for this visit.        ALLERGIES:  Allergies   Allergen Reactions     Simvastatin Muscle Pain (Myalgia)     extreme muscle and joint pains     Hmg-Coa-R Inhibitors      Muscle pain and stiffness     Pollen Extract      Pt. Has  "hayfever       REVIEW OF SYSTEMS:  Constitutional:  No weight loss, fever, chills, weakness or fatigue.  HEENT:  Eyes:  No visual loss, blurred vision, double vision or yellow sclerae. No hearing loss, sneezing, congestion, runny nose or sore throat.  Skin:  No rash or itching.  Cardiovascular: per HPI  Respiratory: per HPI  GI:  No anorexia, nausea, vomiting or diarrhea. No abdominal pain or blood.  :  No dysurea, hematuria  Neurologic:  No headache, dizziness, syncope, paralysis, ataxia, numbness or tingling in the extremities. No change in bowel or bladder control.  Musculoskeletal:  No muscle, back pain, joint pain or stiffness.  Hematologic:  No anemia, bleeding or bruising.  Lymphatics:  No enlarged nodes. No history of splenectomy.  Psychiatric:  No history of depression or anxiety.  Endocrine:  No reports of sweating, cold or heat intolerance. No polyuria or polydipsia.  Allergies:  No history of asthma, hives, eczema or rhinitis.    PHYSICAL EXAM:  /78 (BP Location: Right arm, Patient Position: Sitting, Cuff Size: Adult Large)   Pulse 67   Ht 1.613 m (5' 3.5\")   Wt 85 kg (187 lb 6.4 oz)   LMP  (LMP Unknown)   Breastfeeding No   BMI 32.68 kg/m      Constitutional: awake, alert, no distress  Eyes: PERRL, sclera nonicteric  ENT: trachea midline  Respiratory: Lungs clear  Cardiovascular: Regular rate and rhythm, 2/6 diastolic murmur at the upper sternal border, no ankle edema  GI: nondistended, nontender, bowel sounds present  Lymph/Hematologic: no lymphadenopathy  Skin: dry, no rash  Musculoskeletal: good muscle tone, strength 5/5 in upper and lower extremities  Neurologic: no focal deficits  Neuropsychiatric: appropriate affact    DATA:  Lab: October 2020: Potassium 3.6, creatinine 0.8   Recent Labs   Lab Test 05/29/20  0856 01/27/17  0945 01/15/15  1018   CHOL 199 259* 270*   HDL 55 59 81   * 157* 145*   TRIG 197* 217* 219*   CHOLHDLRATIO  --   --  3.3     ASSESSMENT:  70-year-old female " seen for aortic insufficiency and dilated aorta.  Overall she is doing well.  Aorta has been quite stable at least since 2008 in the 4.6 range.  The aortic insufficiency probably is progressed some, it is in the moderate plus range.  She has no concerning cardiac symptoms.    RECOMMENDATIONS:  1.  Aortic insufficiency, moderate with dilated ascending aorta  -Repeat echo in 1 year    Follow-up in 1 year with JOSE after echo.    Demarcus Dumont MD  Cardiology - [facility]  Pager:  [phone]  Text Page  March 1, 2021        Thank you for allowing me to participate in the care of your patient.    Sincerely,     Demarcus Dumont MD   [facility]

## 2021-03-30 DIAGNOSIS — I82.409 DEEP VEIN THROMBOSIS (DVT) OF LOWER EXTREMITY, UNSPECIFIED CHRONICITY, UNSPECIFIED LATERALITY, UNSPECIFIED VEIN (H): ICD-10-CM

## 2021-03-30 LAB — D DIMER PPP FEU-MCNC: <0.3 UG/ML FEU (ref 0–0.5)

## 2021-03-30 PROCEDURE — 36415 COLL VENOUS BLD VENIPUNCTURE: CPT | Performed by: PHYSICIAN ASSISTANT

## 2021-03-30 PROCEDURE — 85379 FIBRIN DEGRADATION QUANT: CPT | Performed by: PHYSICIAN ASSISTANT

## 2021-04-06 ENCOUNTER — VIRTUAL VISIT (OUTPATIENT)
Dept: SURGERY | Facility: CLINIC | Age: 71
End: 2021-04-06
Attending: INTERNAL MEDICINE
Payer: COMMERCIAL

## 2021-04-06 VITALS — WEIGHT: 180 LBS | BODY MASS INDEX: 30.73 KG/M2 | HEIGHT: 64 IN

## 2021-04-06 DIAGNOSIS — I77.810 AORTIC ROOT DILATATION (H): ICD-10-CM

## 2021-04-06 DIAGNOSIS — I82.512 CHRONIC DEEP VEIN THROMBOSIS (DVT) OF FEMORAL VEIN OF LEFT LOWER EXTREMITY (H): Primary | ICD-10-CM

## 2021-04-06 DIAGNOSIS — D68.51 FACTOR 5 LEIDEN MUTATION, HETEROZYGOUS (H): ICD-10-CM

## 2021-04-06 DIAGNOSIS — I35.1 AORTIC VALVE INSUFFICIENCY, ETIOLOGY OF CARDIAC VALVE DISEASE UNSPECIFIED: ICD-10-CM

## 2021-04-06 DIAGNOSIS — E78.5 DYSLIPIDEMIA, GOAL LDL BELOW 70: ICD-10-CM

## 2021-04-06 DIAGNOSIS — Z86.711 HISTORY OF PULMONARY EMBOLISM: ICD-10-CM

## 2021-04-06 DIAGNOSIS — N20.0 NEPHROLITHIASIS: ICD-10-CM

## 2021-04-06 PROCEDURE — 99214 OFFICE O/P EST MOD 30 MIN: CPT | Mod: 95 | Performed by: INTERNAL MEDICINE

## 2021-04-06 ASSESSMENT — MIFFLIN-ST. JEOR: SCORE: 1308.53

## 2021-04-06 NOTE — PATIENT INSTRUCTIONS
Your D dimer is normal ( good !)    Complete current supply of xarelto 20 mg daily then start 10 mg daily with supper, new RX sent    Repeat d dimer in 3 plus months ( lab order placed )     Fall precautions    Virtual visit after D dimer completed    Continue rest of the medications same.

## 2021-04-06 NOTE — PROGRESS NOTES
Jessica Qiu is a 70 year old year old who is being evaluated via a billable video visit.        If the video visit is dropped, the invitation should be resent by: Text to cell phone: [phone]             Provider visit note:    Chief complaint:  Follow-up visit  Review of labs  Seen and evaluated by cardiologist and underwent echocardiogram  Ascending aorta dilatation unchanged  Aortic valve insufficiency is moderate    History of present illness:  For full details please see my previous evaluation    This is a 70-year-old very pleasant female with history of extensive deep venous thrombosis of iliac, femoral vein of left lower extremity she underwent successful mechanical thrombectomy in May 2020 then followed by initiated Xarelto after 6 months decrease the dose of her D-dimer went up substantially then changed to full dose Xarelto 20 mg daily doing well repeat D-dimer is normal tolerating without any problems and no more falls.  She has known history of ascending aorta dilatation and also aortic valve insufficiency underwent echocardiogram seen by cardiologist      Review of systems: Reviewed all 12 point review of systems as per HPI otherwise unremarkable    Physical exam:( no physical exam done this is virtual visit)    Reviewed recent laboratory tests, imaging studies in the epic and updated chart    Assessment and plan:    1. Hx of extensive  deep vein thrombosis (DVT) of , iliac and femoral vein of left lower extremity s/p [facility] thrombectomy 5/8/2020 (H)    2. History of pulmonary embolism    3. Factor 5 Leiden mutation, heterozygous (H)    She is currently tolerating Xarelto 20 mg daily no history of falls recently D-dimer is normal  She still has almost a 60-day supply of Xarelto left at home  Suggested patient to utilize compression stockings elevate the legs when able avoid falls  Complete current supply of Xarelto 20 mg daily then start low-dose Xarelto 10 mg daily with supper, new prescription  sent  Repeat D-dimer in 3+ months then virtual visit      - Follow-Up with Vascular Medicine  - rivaroxaban ANTICOAGULANT (XARELTO ANTICOAGULANT) 10 MG TABS tablet; Take 1 tablet (10 mg) by mouth daily (with dinner)  Dispense: 30 tablet; Refill: 5  - D dimer quantitative; Future        - Follow-Up with Vascular Medicine    4.  Ascending Aorta dilatation (H) 4.6 cm 1/2021 stable     5. Aortic valve insufficiency, etiology of cardiac valve disease unspecified 3 plus 2/2021 ( worsened compared to 2018 )  She was seen and evaluated by cardiologist recently underwent echocardiogram continue the same plan optimize risk factors and repeat echocardiogram in 1 year  - Follow-Up with Vascular Medicine    6. Nephrolithiasis  She denies any hematuria she is currently on blood thinner Xarelto suggested patient to observe and watch for now  - Follow-Up with Vascular Medicine    7. Dyslipidemia, goal LDL below 70  Doing well with current medications continue the same  - Follow-Up with Vascular Medicine    Video Visit Details    Type of Service: Video Visit    Video Start Time: 10:06 AM    Total visit time spent 30 minutes, review of labs, previous eval , echo , cardiologist eval , documentation etc.    Originating Location (patient location): Home    Distant Location (provider location): [facility]     Mode of Communication:  Video Conference via [employer]      This visit is being conducted as a virtual visit due to the emphasis on mitigation of the COVID-19 virus pandemic. The clinician has decided that the risk of an in-office visit outweighs the benefit for this patient.     Fco Schreiber MD, VERNELL, [facility]  Vascular Medicine

## 2021-05-09 ENCOUNTER — HEALTH MAINTENANCE LETTER (OUTPATIENT)
Age: 71
End: 2021-05-09

## 2021-05-25 ENCOUNTER — RECORDS - HEALTHEAST (OUTPATIENT)
Dept: ADMINISTRATIVE | Facility: CLINIC | Age: 71
End: 2021-05-25

## 2021-05-28 ENCOUNTER — RECORDS - HEALTHEAST (OUTPATIENT)
Dept: ADMINISTRATIVE | Facility: CLINIC | Age: 71
End: 2021-05-28

## 2021-05-29 ENCOUNTER — RECORDS - HEALTHEAST (OUTPATIENT)
Dept: ADMINISTRATIVE | Facility: CLINIC | Age: 71
End: 2021-05-29

## 2021-05-30 ENCOUNTER — RECORDS - HEALTHEAST (OUTPATIENT)
Dept: ADMINISTRATIVE | Facility: CLINIC | Age: 71
End: 2021-05-30

## 2021-06-30 DIAGNOSIS — I82.512 CHRONIC DEEP VEIN THROMBOSIS (DVT) OF FEMORAL VEIN OF LEFT LOWER EXTREMITY (H): ICD-10-CM

## 2021-06-30 DIAGNOSIS — Z86.711 HISTORY OF PULMONARY EMBOLISM: ICD-10-CM

## 2021-06-30 PROCEDURE — 36415 COLL VENOUS BLD VENIPUNCTURE: CPT | Performed by: INTERNAL MEDICINE

## 2021-06-30 PROCEDURE — 85379 FIBRIN DEGRADATION QUANT: CPT | Performed by: INTERNAL MEDICINE

## 2021-07-01 LAB — D DIMER PPP FEU-MCNC: <0.3 UG/ML FEU (ref 0–0.5)

## 2021-07-05 ENCOUNTER — VIRTUAL VISIT (OUTPATIENT)
Dept: OTHER | Facility: CLINIC | Age: 71
End: 2021-07-05
Attending: INTERNAL MEDICINE
Payer: COMMERCIAL

## 2021-07-05 DIAGNOSIS — Z86.711 HISTORY OF PULMONARY EMBOLISM: ICD-10-CM

## 2021-07-05 DIAGNOSIS — I35.1 AORTIC VALVE INSUFFICIENCY, ETIOLOGY OF CARDIAC VALVE DISEASE UNSPECIFIED: ICD-10-CM

## 2021-07-05 DIAGNOSIS — D68.51 FACTOR 5 LEIDEN MUTATION, HETEROZYGOUS (H): ICD-10-CM

## 2021-07-05 DIAGNOSIS — I77.810 AORTIC ROOT DILATATION (H): ICD-10-CM

## 2021-07-05 DIAGNOSIS — I82.512 CHRONIC DEEP VEIN THROMBOSIS (DVT) OF FEMORAL VEIN OF LEFT LOWER EXTREMITY (H): Primary | ICD-10-CM

## 2021-07-05 DIAGNOSIS — E78.5 DYSLIPIDEMIA, GOAL LDL BELOW 70: ICD-10-CM

## 2021-07-05 DIAGNOSIS — N20.0 NEPHROLITHIASIS: ICD-10-CM

## 2021-07-05 PROCEDURE — 99213 OFFICE O/P EST LOW 20 MIN: CPT | Mod: 95 | Performed by: INTERNAL MEDICINE

## 2021-07-05 NOTE — PATIENT INSTRUCTIONS
1. Your recent D dimer test is normal, continue Xarelto 10 mg daily with supper New Rx sent , use compression stockings, elevate leg     Follow up in 6 months virtual visit

## 2021-07-05 NOTE — PROGRESS NOTES
Elba is a 70 year old who is being evaluated via a billable telephone visit.      What phone number would you like to be contacted at? Home phone number [phone]    [facility]        Provider visit note:    Chief complaint:  Follow-up visit  Review of labs  Seen and evaluated by cardiologist and underwent echocardiogram few months ago   Ascending aorta dilatation unchanged  Aortic valve insufficiency is moderate    History of present illness:    For full details please see my previous evaluation     This is a 70-year-old very pleasant female with history of extensive deep venous thrombosis of iliac, femoral vein of left lower extremity she underwent successful mechanical thrombectomy in May 2020 then followed by initiated Xarelto after 6 months decrease the dose of her D-dimer went up substantially then changed to full dose Xarelto 20 mg daily doing well repeat D-dimer is normal, decreased again xarelto 10 , recent D dimer normal , tolerating without any problems and no more falls.  She has known history of ascending aorta dilatation and also aortic valve insufficiency underwent echocardiogram seen by cardiologist    Recent D dimer 0.3       Review of systems: Reviewed all 12 point review of systems as per HPI otherwise unremarkable    Physical exam:( no physical exam done this is virtual visit)    Reviewed recent laboratory tests, imaging studies in the epic and updated chart    Assessment and plan:  1. Hx of extensive  deep vein thrombosis (DVT) of , iliac and femoral vein of left lower extremity s/p [facility] thrombectomy 5/8/2020 (H)     2. History of pulmonary embolism     3. Factor 5 Leiden mutation, heterozygous (H)     She is currently tolerating Xarelto 10 mg daily no history of falls recently D-dimer is normal  Suggested patient to utilize compression stockings elevate the legs when able avoid falls       4.  Ascending Aorta dilatation (H) 4.6 cm 1/2021 stable      5.  Aortic valve insufficiency, etiology of cardiac valve disease unspecified 3 plus 2/2021 ( worsened compared to 2018 )  She was seen and evaluated by cardiologist recently underwent echocardiogram continue the same plan optimize risk factors and repeat echocardiogram in 1 year  - Follow-Up with Vascular Medicine     6. Nephrolithiasis  She denies any hematuria she is currently on blood thinner Xarelto suggested patient to observe and watch for now  - Follow-Up with Vascular Medicine     7. Dyslipidemia, goal LDL below 70  Doing well with current medications continue the same  - Follow-Up with Vascular Medicine       This visit is being conducted as a virtual visit due to the emphasis on mitigation of the COVID-19 virus pandemic. The clinician has decided that the risk of an in-office visit outweighs the benefit for this patient.     Fco Schreiber MD

## 2021-07-13 ENCOUNTER — RECORDS - HEALTHEAST (OUTPATIENT)
Dept: ADMINISTRATIVE | Facility: CLINIC | Age: 71
End: 2021-07-13

## 2021-09-07 ENCOUNTER — TELEPHONE (OUTPATIENT)
Dept: OTHER | Facility: CLINIC | Age: 71
End: 2021-09-07

## 2021-09-07 NOTE — TELEPHONE ENCOUNTER
Patient takes Xarelto 10 mg daily.     LOV 7/5/21  Follow up due: January 2021    1. Hx of extensive  deep vein thrombosis (DVT) of , iliac and femoral vein of left lower extremity s/p [facility] thrombectomy 5/8/2020 (H)   2. History of pulmonary embolism  3. Factor 5 Leiden mutation, heterozygous    Called patient to learn more about her symptoms:    Patient says there is a big lump at the top of her right leg.  It is about an inch large.  Feels like a swollen gland. It is not rigid.  It is not painful. No discoloration.  It is not warm to the touch.    No different numbness or tingling below that area.  No changes in sensation.  No chest pain or shortness of breath.      Elba and I discussed that if this lump persists, she should have it evaluated by her primary care provider.  If it starts to be painful, warm, red, or if she has changes in sensation, she should call back and we will have her in for imaging.  If symptoms are severe, she should be seen more urgently ([facility]/ED).    Routing to Dr. Schreiber for any additional recommendations.  Ainsley Keating RN BSN  [facility] Vascular [facility]  [phone]

## 2021-09-17 ENCOUNTER — HOSPITAL ENCOUNTER (EMERGENCY)
Facility: CLINIC | Age: 71
Discharge: HOME | End: 2021-09-17
Attending: EMERGENCY MEDICINE | Admitting: EMERGENCY MEDICINE
Payer: COMMERCIAL

## 2021-09-17 ENCOUNTER — APPOINTMENT (OUTPATIENT)
Dept: ULTRASOUND IMAGING | Facility: CLINIC | Age: 71
End: 2021-09-17
Attending: EMERGENCY MEDICINE
Payer: COMMERCIAL

## 2021-09-17 VITALS
HEART RATE: 90 BPM | TEMPERATURE: 98.2 F | DIASTOLIC BLOOD PRESSURE: 80 MMHG | SYSTOLIC BLOOD PRESSURE: 139 MMHG | RESPIRATION RATE: 20 BRPM | OXYGEN SATURATION: 99 %

## 2021-09-17 DIAGNOSIS — R59.9 ENLARGED LYMPH NODES: ICD-10-CM

## 2021-09-17 PROCEDURE — 99284 EMERGENCY DEPT VISIT MOD MDM: CPT | Mod: 25

## 2021-09-17 PROCEDURE — 93970 EXTREMITY STUDY: CPT

## 2021-09-18 NOTE — ED TRIAGE NOTES
"Pt states BLE swelling with \"lump\" on RLE for over one week. Pt expresses concern for DVT. Pt states was told by vascular surgeon to come to ED for evaluation. Pt states currently taking 10mg Xarelto, states Xarelto was recently lowered from 20mg. Pt states has been taking Xarelto as instructed.  ABCs intact GCS 15  "

## 2021-09-18 NOTE — DISCHARGE INSTRUCTIONS
The lump is an enlarged lymph node  Follow up with your doctor in 1 week if not resolved  Continue all medications

## 2021-09-18 NOTE — ED PROVIDER NOTES
History   Chief Complaint:  Leg Swelling     The history is provided by the patient.      Jessica Qiu is a 70 year old female with history of DVT and PE, on Eliquis who presents for evaluation of leg swelling. The patient states that last week she noticed a new lump in her upper right thigh. She states that this has not gone away and she noticed some bilateral leg swelling over the last few days as well. Her last blood clot was around a year ago and she denies any known trauma to the leg in that area. She denies any other known symptoms or concerns at this time.     Review of Systems   Cardiovascular: Positive for leg swelling.   Skin:        Lump in Right Upper Leg   All other systems reviewed and are negative.        Allergies:  Hmg-Coa-R Inhibitors  Pollen Extract    Medications:  Xarelto  Benadryl  Allopurinol  Citalopram    Past Medical History:    PE  Obesity  DVT  Normal pressure hydrocephalus  Ascending aorta dilation  Short achilles tendon  Foot Drop Bilateral  RAMÍREZ  Osteopenia  Hydrocephalus  Mixed stress and urge urinary incontinence  Bilateral cataracts  Posterior vitreous detachment of left eye  Cognitive decline  Gait disorder     Past Surgical History:    Breast Biopsy  Kidney Stones  Tonsillectomy  Lower Extremity Venogram Left  Lithotripsy     Family History:    Brain Aneurysm  Colorectal Cancer    Social History:  The patient presents to the ED alone.    Physical Exam     Patient Vitals for the past 24 hrs:   BP Temp Temp src Pulse Resp SpO2   09/1950 139/80 98.2  F (36.8  C) Oral 90 20 99 %       Physical Exam  General- alert, cooperative  Pulm- normal respiratory effort, no respiratory distress  Msk- RUE: 2+ pulses, sensation to light touch intact, no wounds or abrasions   ROM normal without difficulty, 5/5 strength           LUE: 2+ pulses, sensation to light touch intact, no wounds or abrasions   ROM normal without difficulty, 5/5 strength           RLE: 2+ pulses, sensation to  light touch intact, no wounds or abrasions   ROM normal without difficulty, 5/5 strength. nontender palpable mass R inner upper thigh  without signs of infection           LLE: 2+ pulses, sensation intact to light touch, no wounds or abrasions   ROM normal without difficulty, 5/5 strength  Skin- no cyanosis or edema, no swelling, no rash or petechiae  Psych- normal mood and affect, normal behavior        Emergency Department Course   Imaging:  US Lower Extremity Venous Duplex Bilateral:  1.  Focal chronic nonocclusive thrombus in the mid right femoral vein. The remainder of the right lower extremity veins appear patent.   2.  In the area of the palpable abnormality, there appears to be a hypoechoic solid structure measuring 3.4 x 1.8 cm which may represent an abnormal lymph node. This finding appears amenable to percutaneous ultrasound guided biopsy. Report per radiology     Emergency Department Course:  Reviewed:  I reviewed nursing notes, vitals, past medical history and care everywhere    Assessments:  2050 I performed a physical exam of the patient. Findings as above.     2140 Patient rechecked and updated. Plan of care discussed and questions answered.     Disposition:  The patient was discharged to home.     Impression & Plan   Medical Decision Making:  Jessica Qiu is a 70 year old female who presents with a lump in the right groin as well as swelling. Patient does have a history of DVT but she is on Xarelto and taking this appropriately. Ultrasound shows no signs of DVT other than chronic one. The area of palpable lump appears to be a enlarged lymph node. She will follow up with her doctor on that. She was comfortable with the plan and will make a appointment next week and monitor that closely. No changes to her medications is indicated and patient is discharged in stable condition.      Diagnosis:    ICD-10-CM    1. Enlarged lymph nodes  R59.9      Scribe Disclosure:  I, Wayne Ley, am serving as a  scribe at 8:50 PM on 9/17/2021 to document services personally performed by Adalid Dow MD based on my observations and the provider's statements to me.     [facility]         Adalid Dow MD  09/17/21 [id number]

## 2021-09-27 ENCOUNTER — TELEPHONE (OUTPATIENT)
Dept: OTHER | Facility: CLINIC | Age: 71
End: 2021-09-27

## 2021-09-27 NOTE — TELEPHONE ENCOUNTER
Returned patient's call.    Patient states she has an enlarged lymph node in her thigh (seen on LLE venous US 9/1721)    IMPRESSION:  1.  Focal chronic nonocclusive thrombus in the mid right femoral vein. The remainder of the right lower extremity veins appear patent.  2.  In the area of the palpable abnormality, there appears to be a hypoechoic solid structure measuring 3.4 x 1.8 cm which may represent an abnormal lymph node. This finding appears amenable to percutaneous ultrasound guided biopsy.    I explained to Elba that this finding was not vascular and that she should follow up with her primary care provider to discuss possible percutaneous ultrasound guided biopsy as suggested by the reading radiologist.    Routing to Dr. Schreiber to inform.    Ainsley Keating RN BSN  [facility]  [phone]              Ainsley KINGN  [facility]  [phone]

## 2021-09-27 NOTE — TELEPHONE ENCOUNTER
[facility]    Who is the name of the provider?:        What is the location you see this provider at?: [place]    Reason for call:  Questions re upcoming extended air travel.     Can we leave a detailed message on this number?  YES

## 2021-10-24 ENCOUNTER — HEALTH MAINTENANCE LETTER (OUTPATIENT)
Age: 71
End: 2021-10-24

## 2021-12-04 DIAGNOSIS — I82.512 CHRONIC DEEP VEIN THROMBOSIS (DVT) OF FEMORAL VEIN OF LEFT LOWER EXTREMITY (H): ICD-10-CM

## 2021-12-04 DIAGNOSIS — Z86.711 HISTORY OF PULMONARY EMBOLISM: ICD-10-CM

## 2021-12-06 NOTE — TELEPHONE ENCOUNTER
rivaroxaban ANTICOAGULANT (XARELTO ANTICOAGULANT) 10 MG TABS tablet  Last Written Prescription Date:  7/5/21  Last Fill Quantity: 30,  # refills: 11     Last office visit: 7/5/21  Follow up due:  January 2022    Unable to fill per [facility] protocol.  Medication and pharmacy loaded.    Direct Oral Anticoagulant Agents Failed 12/04/2021 02:14 PM   Protocol Details  Normal Platelets on file in past 12 months    Creatinine Clearance greater than 50 ml/min on file in past 3 mos    Serum creatinine less than or equal to 1.4 on file in past 3 mos       Ainsley Keating RN BSN  [facility]  [phone]

## 2021-12-22 ENCOUNTER — TELEPHONE (OUTPATIENT)
Dept: OTHER | Facility: CLINIC | Age: 71
End: 2021-12-22
Payer: COMMERCIAL

## 2021-12-22 DIAGNOSIS — I35.1 AORTIC VALVE INSUFFICIENCY, ETIOLOGY OF CARDIAC VALVE DISEASE UNSPECIFIED: ICD-10-CM

## 2021-12-22 DIAGNOSIS — I77.810 MILD DILATION OF ASCENDING AORTA (H): ICD-10-CM

## 2021-12-22 DIAGNOSIS — D68.51 FACTOR 5 LEIDEN MUTATION, HETEROZYGOUS (H): ICD-10-CM

## 2021-12-22 DIAGNOSIS — E78.5 DYSLIPIDEMIA, GOAL LDL BELOW 70: ICD-10-CM

## 2021-12-22 DIAGNOSIS — I82.512 CHRONIC DEEP VEIN THROMBOSIS (DVT) OF FEMORAL VEIN OF LEFT LOWER EXTREMITY (H): Primary | ICD-10-CM

## 2021-12-22 DIAGNOSIS — I77.810 AORTIC ROOT DILATATION (H): ICD-10-CM

## 2021-12-22 NOTE — TELEPHONE ENCOUNTER
Please arrange for virtual visit with Dr. Schreiber  (due 1/1/22).  No labs necessary.        Ainsley Keating RN BSN  [facility]  [phone]

## 2021-12-22 NOTE — TELEPHONE ENCOUNTER
Patient called to schedule follow up from a letter she received in the mail, but I do not see orders.  Patient believes she needs labs to follow up as she is on xarelto.  I will route to the nurse for review and potential .

## 2021-12-23 NOTE — TELEPHONE ENCOUNTER
PT has been scheduled for virtual video visit with Dr. Schreiber- thalia contact # is in appt notes

## 2022-01-18 ENCOUNTER — VIRTUAL VISIT (OUTPATIENT)
Dept: SURGERY | Facility: CLINIC | Age: 72
End: 2022-01-18
Attending: INTERNAL MEDICINE
Payer: COMMERCIAL

## 2022-01-18 VITALS — WEIGHT: 190 LBS | HEIGHT: 64 IN | BODY MASS INDEX: 32.44 KG/M2

## 2022-01-18 DIAGNOSIS — D68.51 FACTOR 5 LEIDEN MUTATION, HETEROZYGOUS (H): ICD-10-CM

## 2022-01-18 DIAGNOSIS — I77.810 MILD DILATION OF ASCENDING AORTA (H): ICD-10-CM

## 2022-01-18 DIAGNOSIS — I35.1 AORTIC VALVE INSUFFICIENCY, ETIOLOGY OF CARDIAC VALVE DISEASE UNSPECIFIED: ICD-10-CM

## 2022-01-18 DIAGNOSIS — I82.512 CHRONIC DEEP VEIN THROMBOSIS (DVT) OF FEMORAL VEIN OF LEFT LOWER EXTREMITY (H): Primary | ICD-10-CM

## 2022-01-18 DIAGNOSIS — I77.810 AORTIC ROOT DILATATION (H): ICD-10-CM

## 2022-01-18 DIAGNOSIS — E78.5 DYSLIPIDEMIA, GOAL LDL BELOW 70: ICD-10-CM

## 2022-01-18 PROCEDURE — 99213 OFFICE O/P EST LOW 20 MIN: CPT | Mod: 95 | Performed by: INTERNAL MEDICINE

## 2022-01-18 ASSESSMENT — MIFFLIN-ST. JEOR: SCORE: 1348.89

## 2022-01-18 NOTE — PATIENT INSTRUCTIONS
continue Xarelto 10 mg daily    Avoid falls    Use compression stockings daytime and elevate leg when able.    Return to clinic in 6 months either virtual or office visit

## 2022-01-18 NOTE — PROGRESS NOTES
Jessica Qiu is a 71 year old year old who is being evaluated via a billable telephone visit.      What phone number would you like to be contacted at? [phone]      Provider visit note:    Chief complaint:  Follow-up visit  Seen in ER for groin lump , LN   Review of labs  Seen and evaluated by cardiologist and underwent echocardiogram few months ago   Ascending aorta dilatation unchanged  Aortic valve insufficiency is moderate    History of present illness:    For full details please see my previous evaluation     This is a 70-year-old very pleasant female with history of extensive deep venous thrombosis of iliac, femoral vein of left lower extremity she underwent successful mechanical thrombectomy in May 2020 then followed by initiated Xarelto after 6 months decrease the dose of her D-dimer went up substantially then changed to full dose Xarelto 20 mg daily doing well repeat D-dimer is normal, decreased again xarelto 10 , recent D dimer normal , tolerating without any problems and no more falls.  She has known history of ascending aorta dilatation and also aortic valve insufficiency underwent echocardiogram seen by cardiologist     Recent D dimer 0.3   Review of systems: Reviewed all 12 point review of systems as per HPI otherwise unremarkable    Physical exam:( no physical exam done this is virtual visit)    Reviewed recent laboratory tests, imaging studies in the epic and updated chart    EXAM: US LOWER EXTREMITY VENOUS DUPLEX BILATERAL  LOCATION: [facility]  DATE/TIME: 9/17/2021 8:59 PM     INDICATION: Bilateral lower extremity swelling, palpable lump in right lower extremity  COMPARISON: None.  TECHNIQUE: Venous Duplex ultrasound of bilateral lower extremities with and without compression, augmentation and duplex. Color flow and spectral Doppler with waveform analysis performed.     FINDINGS: Exam includes the common femoral, femoral, popliteal veins as well as segmentally  visualized deep calf veins and greater saphenous vein.      RIGHT: Focal nonocclusive chronic appearing thrombus in the mid femoral vein, otherwise patent deep veins. No superficial thrombophlebitis. No popliteal cyst. Hypodense nodule in the proximal right thigh in area of palpable lump measuring 3.4 x 1.8 cm.   Other areas of palpable lumps represent lymph nodes.     LEFT: No deep vein thrombosis. No superficial thrombophlebitis. No popliteal cyst.                                                                      IMPRESSION:  1.  Focal chronic nonocclusive thrombus in the mid right femoral vein. The remainder of the right lower extremity veins appear patent.  2.  In the area of the palpable abnormality, there appears to be a hypoechoic solid structure measuring 3.4 x 1.8 cm which may represent an abnormal lymph node. This finding appears amenable to percutaneous ultrasound guided biopsy.      Assessment and plan:    1. Hx of extensive  deep vein thrombosis (DVT) of , iliac and femoral vein of left lower extremity s/p mech thrombectomy 5/8/2020 (H)     2. History of pulmonary embolism     3. Factor 5 Leiden mutation, heterozygous (H)     She is currently tolerating Xarelto 10 mg daily no history of falls recent D-dimer is normal  Suggested patient to utilize compression stockings elevate the legs when able avoid falls        4.  Ascending Aorta dilatation (H) 4.6 cm 1/2021 stable      5. Aortic valve insufficiency, etiology of cardiac valve disease unspecified 3 plus 2/2021 ( worsened compared to 2018 )  She was seen and evaluated by cardiologist recently underwent echocardiogram continue the same plan optimize risk factors and repeat echocardiogram in 1 year    6. Nephrolithiasis  She denies any hematuria she is currently on blood thinner Xarelto suggested patient to observe and watch for now     7. Dyslipidemia, goal LDL below 70  Doing well with current medications continue the same    21 minutes spent on the  date of the encounter doing chart review, recent ER evaluation, recent imaging studies, previous evaluation, documentation etc.    This visit is being conducted as a virtual visit due to the emphasis on mitigation of the COVID-19 virus pandemic. The clinician has decided that the risk of an in-office visit outweighs the benefit for this patient.     Fco Schreiber MD

## 2022-04-06 ENCOUNTER — TELEPHONE (OUTPATIENT)
Dept: CARDIOLOGY | Facility: CLINIC | Age: 72
End: 2022-04-06
Payer: COMMERCIAL

## 2022-04-06 NOTE — TELEPHONE ENCOUNTER
Orders extended. Messaged scheduling to call pt to arrange.     Tamela Rahman RN, BSN  04/06/22 at 2:43 PM

## 2022-04-06 NOTE — TELEPHONE ENCOUNTER
[facility] Call Center    Phone Message    May a detailed message be left on voicemail: yes     Reason for Call: Other: Elba calling in response to a letter she received to schedule her Echo and follow up appt.  The Echo order did  on 3/1/2022.  Please replace/extend the Echo order and call Elba once this is done to connect her with scheduling.  Thank you!     Action Taken: Message routed to:  Other: Cardiology    Travel Screening: Not Applicable

## 2022-06-13 DIAGNOSIS — I82.512 CHRONIC DEEP VEIN THROMBOSIS (DVT) OF FEMORAL VEIN OF LEFT LOWER EXTREMITY (H): ICD-10-CM

## 2022-06-13 DIAGNOSIS — Z86.711 HISTORY OF PULMONARY EMBOLISM: ICD-10-CM

## 2022-06-13 RX ORDER — RIVAROXABAN 10 MG/1
TABLET, FILM COATED ORAL
Qty: 30 TABLET | Refills: 11 | Status: SHIPPED | OUTPATIENT
Start: 2022-06-13 | End: 2023-03-01

## 2022-06-13 NOTE — TELEPHONE ENCOUNTER
Unable to refill per [facility] protocol.  Med and pharm loaded.    Veronique ELLIS, RN    [facility]  Office: [phone]  Fax: [phone]

## 2022-07-05 ENCOUNTER — HOSPITAL ENCOUNTER (OUTPATIENT)
Dept: CARDIOLOGY | Facility: CLINIC | Age: 72
Discharge: HOME | End: 2022-07-05
Attending: INTERNAL MEDICINE | Admitting: INTERNAL MEDICINE
Payer: COMMERCIAL

## 2022-07-05 DIAGNOSIS — I77.810 ASCENDING AORTA DILATION (H): ICD-10-CM

## 2022-07-05 DIAGNOSIS — I35.1 NONRHEUMATIC AORTIC VALVE INSUFFICIENCY: ICD-10-CM

## 2022-07-05 PROCEDURE — 93306 TTE W/DOPPLER COMPLETE: CPT | Mod: 26 | Performed by: INTERNAL MEDICINE

## 2022-07-05 PROCEDURE — 93306 TTE W/DOPPLER COMPLETE: CPT

## 2022-07-20 ENCOUNTER — VIRTUAL VISIT (OUTPATIENT)
Dept: OTHER | Facility: CLINIC | Age: 72
End: 2022-07-20
Attending: INTERNAL MEDICINE
Payer: COMMERCIAL

## 2022-07-20 DIAGNOSIS — I77.810 MILD DILATION OF ASCENDING AORTA (H): ICD-10-CM

## 2022-07-20 DIAGNOSIS — I82.512 CHRONIC DEEP VEIN THROMBOSIS (DVT) OF FEMORAL VEIN OF LEFT LOWER EXTREMITY (H): Primary | ICD-10-CM

## 2022-07-20 DIAGNOSIS — E78.5 DYSLIPIDEMIA, GOAL LDL BELOW 70: ICD-10-CM

## 2022-07-20 DIAGNOSIS — I77.810 AORTIC ROOT DILATATION (H): ICD-10-CM

## 2022-07-20 DIAGNOSIS — I35.1 AORTIC VALVE INSUFFICIENCY, ETIOLOGY OF CARDIAC VALVE DISEASE UNSPECIFIED: ICD-10-CM

## 2022-07-20 DIAGNOSIS — D68.51 FACTOR 5 LEIDEN MUTATION, HETEROZYGOUS (H): ICD-10-CM

## 2022-07-20 PROCEDURE — 99213 OFFICE O/P EST LOW 20 MIN: CPT | Mod: 95 | Performed by: INTERNAL MEDICINE

## 2022-07-20 NOTE — PATIENT INSTRUCTIONS
Since you are not able to tolerate compression stockings please use Ace wraps in both legs and elevate the legs when able  Continue current medications  Follow-up with the cardiologist as scheduled  Office visit or virtual visit in 6 months

## 2022-07-20 NOTE — PROGRESS NOTES
"Elba is a 71 year old who is being evaluated via a billable phone visit.      What phone number would you like to be contacted at? [phone]  How would you like to obtain your AVS? Stefany Pathak    Provider visit note:    Chief complaint:    Follow-up visit  intermittent swelling of feet and legs once a week   Review of labs  Recently underwent echocardiogram with worsenedAscending aorta dilatation   Aortic valve insufficiency is moderate  Intermittent leg swelling  No chest pain or shortness of breath  Unable to tolerate compression stockings    History of present illness:  For full details please see my previous evaluation     This is a 71-year-old very pleasant female with history of extensive deep venous thrombosis of iliac, femoral vein of left lower extremity she underwent successful mechanical thrombectomy in May 2020 then followed by initiated full dose Xarelto,  after 6 months decreased the dose to 10 mg daily and multiple occasions D-dimer was good range, tolerating medications without any problems   Not able to wear compression stockings  Intermittent leg swelling  Recent echo worsening aortic root dilatation and she has aortic valve insufficiency moderate  Scheduled to see cardiologist next week         Review of systems: Reviewed all 12 point review of systems as per HPI otherwise unremarkable    Physical exam:( no physical exam done this is virtual visit)    Reviewed recent laboratory tests, imaging studies in the epic and updated chart    ECHO 7/5/2022  \"Interpretation Summary     Mild aortic root dilatation, 3.9 cm. 3.5 cm last year.  The ascending aorta is Moderately dilated, 4.5 cm vs 4.4 cm last year.  There is mild to moderate (1-2+) aortic regurgitation, central jet, nl lv  size. apppears less compared with last year's study.\"  ______________________________________________________________________________      Assessment and plan:    1. Hx of extensive  deep vein thrombosis (DVT) " of , iliac and femoral vein of left lower extremity s/p [facility] thrombectomy 5/8/2020 (H)     2. History of pulmonary embolism     3. Factor 5 Leiden mutation, heterozygous (H)     She is currently tolerating Xarelto 10 mg daily no history of falls, continue the same  Since she is not able to handle compression stockings utilize Ace wraps and elevate legs when able        4.  Ascending Aorta dilatation (H) 4.6 cm 1/2021 stable (ranging from 4.4 cm to 4.5 cm last 2 years)     5. Aortic valve insufficiency, etiology of cardiac valve disease unspecified   She is scheduled to see cardiologist next week continue the same plan    6. Nephrolithiasis  She denies any hematuria she is currently on blood thinner Xarelto suggested patient to observe and watch for now     7. Dyslipidemia, goal LDL below 70  Doing well with current medications continue the same         21 minutes spent on the date of the encounter doing chart review, recent  evaluation, recent imaging studies, previous evaluation, documentation etc.    This visit is being conducted as a virtual visit due to the emphasis on mitigation of the COVID-19 virus pandemic. The clinician has decided that the risk of an in-office visit outweighs the benefit for this patient.     Fco Schreiber MD, [place]NE[place], [facility]  Vascular Medicine

## 2022-08-08 NOTE — PROGRESS NOTES
CARDIOLOGY VISIT    REASON FOR VISIT: AI, dilated aorta    SUBJECTIVE:  71-year-old female seen for f/u of aortic insufficiency and dilated ascending aorta.   She has factor V leiden with LLE DVT and PE 5/2020.     Echocardiogram May 2008 at [facility] showed preserved ejection fraction, trace aortic insufficiency, positive bubble study, aorta 4.75 cm.  Thoracic MRA showed ascending aorta 4.6 x 4.3 cm, arch 2.7 cm, and descending aorta 2.5 cm.       Echo 2/2017 showed normal left ventricular size, ejection fraction 60%, trileaflet aortic valve, 2+ AI, aorta 4.6 cm.        Echo April 2018 showed EF 60%, normal RV, mild to moderate aortic insufficiency, ascending aorta 4.6 cm.      Echo January 2021 showed EF 60%, normal RV, 3+ AI, aorta 4.4 cm.     Echo July 2022 showed EF 55%, 1-2+ AI, aorta 4.5 cm.    She has been feeling a little more dyspneic lately.  She also has some intermittent slight worsening of her lower extremity edema.  She feels an occasional stabbing chest pain that comes on randomly and lasts 1 or 2 seconds, this does not occur with exertion.  She has been trying to minimize salt.  She thinks her blood pressure tends to run on the low side.    MEDICATIONS:  Current Outpatient Medications   Medication     allopurinol (ZYLOPRIM) 300 MG tablet     citalopram (CELEXA) 20 MG tablet     diphenhydrAMINE (BENADRYL) 25 MG tablet     XARELTO ANTICOAGULANT 10 MG TABS tablet     No current facility-administered medications for this visit.       ALLERGIES:  Allergies   Allergen Reactions     Hmg-Coa-R Inhibitors Muscle Pain (Myalgia)     Muscle pain and stiffness     Pollen Extract      Pt. Has hayfever       REVIEW OF SYSTEMS:  Constitutional:  No weight loss, fever, chills  HEENT:  Eyes:  No visual loss, blurred vision, double vision or yellow sclerae. No hearing loss, sneezing, congestion, runny nose or sore throat.  Skin:  No rash or itching.  Cardiovascular: per HPI  Respiratory: per HPI  GI:  No  "anorexia, nausea, vomiting or diarrhea. No abdominal pain or blood.  :  No dysurea, hematuria  Neurologic:  No headache, paralysis, ataxia, numbness or tingling in the extremities. No change in bowel or bladder control.  Musculoskeletal:  No muscle pain  Hematologic:  No bleeding or bruising.  Lymphatics:  No enlarged nodes. No history of splenectomy.  Endocrine:  No reports of sweating, cold or heat intolerance. No polyuria or polydipsia.  Allergies:  No history of asthma, hives, eczema or rhinitis.    PHYSICAL EXAM:  /70 (BP Location: Right arm, Patient Position: Sitting, Cuff Size: Adult Large)   Pulse 85   Ht 1.614 m (5' 3.55\")   Wt 91.2 kg (201 lb)   LMP  (LMP Unknown)   SpO2 97%   BMI 34.99 kg/m      Constitutional: awake, alert, no distress  Eyes: PERRL, sclera nonicteric  ENT: trachea midline  Respiratory: Lungs clear   Cardiovascular: regular rate and rhythm, 2/6 diastolic murmur at the upper sternal border, trace ankle edema  GI: nondistended, nontender, bowel sounds present  Lymph/Hematologic: no lymphadenopathy  Skin: dry, no rash  Musculoskeletal: good muscle tone, strength 5/5 in upper and lower extremities  Neurologic: no focal deficits  Neuropsychiatric: appropriate affact    DATA:  Lab:   Recent Labs   Lab Test 05/29/20  0856 01/27/17  0945 01/15/15  1018   CHOL 199 259* 270*   HDL 55 59 81   * 157* 145*   TRIG 197* 217* 219*   CHOLHDLRATIO  --   --  3.3     ASSESSMENT:  71-year-old female seen for dilated aorta and aortic insufficiency.  Her echo was unchanged.  Aorta has been stable for over 10 years.  Doubtful her edema and dyspnea are cardiac related.  Will check BMP and BNP today.  Her edema could be from venous insufficiency, ultrasound in 2017 showed right venous reflux.  If edema worsens, could try low-dose diuretic, but would also repeat lower extremity venous competency studies.    RECOMMENDATIONS:  1.  Dilated ascending aorta with aortic insufficiency  - Repeat echo " in 2 years, next in 2024    2.  Lower extremity edema  -BMP, NT proBNP  - If symptoms worsen, lower extremity venous competency studies  - Could consider low-dose diuretic in the future    Follow-up in 1 year with JOSE.    Demarcus Dumont MD  Cardiology - [facility]  Pager:  [phone]  Text Page  August 10, 2022

## 2022-08-10 ENCOUNTER — LAB (OUTPATIENT)
Dept: LAB | Facility: CLINIC | Age: 72
End: 2022-08-10

## 2022-08-10 ENCOUNTER — OFFICE VISIT (OUTPATIENT)
Dept: CARDIOLOGY | Facility: CLINIC | Age: 72
End: 2022-08-10
Payer: COMMERCIAL

## 2022-08-10 VITALS
BODY MASS INDEX: 34.31 KG/M2 | HEART RATE: 85 BPM | OXYGEN SATURATION: 97 % | DIASTOLIC BLOOD PRESSURE: 70 MMHG | SYSTOLIC BLOOD PRESSURE: 126 MMHG | WEIGHT: 201 LBS | HEIGHT: 64 IN

## 2022-08-10 DIAGNOSIS — R06.09 DYSPNEA ON EXERTION: Primary | ICD-10-CM

## 2022-08-10 DIAGNOSIS — I35.1 NONRHEUMATIC AORTIC VALVE INSUFFICIENCY: ICD-10-CM

## 2022-08-10 DIAGNOSIS — R06.09 DYSPNEA ON EXERTION: ICD-10-CM

## 2022-08-10 DIAGNOSIS — I77.810 ASCENDING AORTA DILATION (H): ICD-10-CM

## 2022-08-10 LAB
ANION GAP SERPL CALCULATED.3IONS-SCNC: 11 MMOL/L (ref 7–15)
BUN SERPL-MCNC: 15.4 MG/DL (ref 8–23)
CALCIUM SERPL-MCNC: 9.8 MG/DL (ref 8.8–10.2)
CHLORIDE SERPL-SCNC: 102 MMOL/L (ref 98–107)
CREAT SERPL-MCNC: 0.99 MG/DL (ref 0.51–0.95)
DEPRECATED HCO3 PLAS-SCNC: 26 MMOL/L (ref 22–29)
GFR SERPL CREATININE-BSD FRML MDRD: 60 ML/MIN/1.73M2
GLUCOSE SERPL-MCNC: 88 MG/DL (ref 70–99)
NT-PROBNP SERPL-MCNC: 83 PG/ML (ref 0–125)
POTASSIUM SERPL-SCNC: 4.1 MMOL/L (ref 3.4–5.3)
SODIUM SERPL-SCNC: 139 MMOL/L (ref 136–145)

## 2022-08-10 PROCEDURE — 80048 BASIC METABOLIC PNL TOTAL CA: CPT | Performed by: INTERNAL MEDICINE

## 2022-08-10 PROCEDURE — 36415 COLL VENOUS BLD VENIPUNCTURE: CPT | Performed by: INTERNAL MEDICINE

## 2022-08-10 PROCEDURE — 99214 OFFICE O/P EST MOD 30 MIN: CPT | Performed by: INTERNAL MEDICINE

## 2022-08-10 PROCEDURE — 83880 ASSAY OF NATRIURETIC PEPTIDE: CPT | Performed by: INTERNAL MEDICINE

## 2022-08-10 RX ORDER — ACETAMINOPHEN 500 MG
500-1000 TABLET ORAL EVERY 6 HOURS PRN
COMMUNITY

## 2022-08-10 RX ORDER — CALCIUM CARBONATE 500(1250)
1 TABLET ORAL 2 TIMES DAILY
COMMUNITY

## 2022-08-10 NOTE — PATIENT INSTRUCTIONS
Your recent echo showed that your heart function is normal, there is a moderate amount of leaking of the aortic valve.  The aorta, or main blood vessel coming off the heart, is enlarged, however it has not changed compared to your previous echoes.    I would like you to come back in 1 year for a routine follow-up visit.  I would like to repeat another echo in about 2 years time.      Will check labs today for kidney function.  If your swelling worsens, call us and would then recommend doing an ultrasound of the leg veins to check for worsening vein leaking.   [Consultation] : a consultation visit

## 2022-10-15 ENCOUNTER — HEALTH MAINTENANCE LETTER (OUTPATIENT)
Age: 72
End: 2022-10-15

## 2022-11-30 NOTE — ED TRIAGE NOTES
Patient presents unable to void completely since last Friday and since then becoming more SOB, unsteady gait, stomach cramping and confusion.  ABCs intact.   Detail Level: Zone

## 2023-02-26 ENCOUNTER — APPOINTMENT (OUTPATIENT)
Dept: CT IMAGING | Facility: CLINIC | Age: 73
End: 2023-02-26
Attending: EMERGENCY MEDICINE
Payer: COMMERCIAL

## 2023-02-26 ENCOUNTER — HOSPITAL ENCOUNTER (EMERGENCY)
Facility: CLINIC | Age: 73
Discharge: HOME | End: 2023-02-26
Attending: EMERGENCY MEDICINE | Admitting: EMERGENCY MEDICINE
Payer: COMMERCIAL

## 2023-02-26 ENCOUNTER — APPOINTMENT (OUTPATIENT)
Dept: ULTRASOUND IMAGING | Facility: CLINIC | Age: 73
End: 2023-02-26
Attending: EMERGENCY MEDICINE
Payer: COMMERCIAL

## 2023-02-26 VITALS
RESPIRATION RATE: 16 BRPM | DIASTOLIC BLOOD PRESSURE: 68 MMHG | HEART RATE: 71 BPM | HEIGHT: 64 IN | BODY MASS INDEX: 34.15 KG/M2 | WEIGHT: 200 LBS | TEMPERATURE: 98.1 F | SYSTOLIC BLOOD PRESSURE: 129 MMHG | OXYGEN SATURATION: 98 %

## 2023-02-26 DIAGNOSIS — R19.09 MASS OF RIGHT INGUINAL REGION: Primary | ICD-10-CM

## 2023-02-26 LAB
ALBUMIN SERPL BCG-MCNC: 4.3 G/DL (ref 3.5–5.2)
ALP SERPL-CCNC: 77 U/L (ref 35–104)
ALT SERPL W P-5'-P-CCNC: 25 U/L (ref 10–35)
ANION GAP SERPL CALCULATED.3IONS-SCNC: 13 MMOL/L (ref 7–15)
AST SERPL W P-5'-P-CCNC: 22 U/L (ref 10–35)
BASOPHILS # BLD AUTO: 0.2 10E3/UL (ref 0–0.2)
BASOPHILS NFR BLD AUTO: 2 %
BILIRUB SERPL-MCNC: 0.3 MG/DL
BUN SERPL-MCNC: 17.1 MG/DL (ref 8–23)
CALCIUM SERPL-MCNC: 9.1 MG/DL (ref 8.8–10.2)
CHLORIDE SERPL-SCNC: 102 MMOL/L (ref 98–107)
CREAT SERPL-MCNC: 0.81 MG/DL (ref 0.51–0.95)
DEPRECATED HCO3 PLAS-SCNC: 24 MMOL/L (ref 22–29)
EOSINOPHIL # BLD AUTO: 1 10E3/UL (ref 0–0.7)
EOSINOPHIL NFR BLD AUTO: 13 %
ERYTHROCYTE [DISTWIDTH] IN BLOOD BY AUTOMATED COUNT: 13.7 % (ref 10–15)
GFR SERPL CREATININE-BSD FRML MDRD: 76 ML/MIN/1.73M2
GLUCOSE SERPL-MCNC: 115 MG/DL (ref 70–99)
HCT VFR BLD AUTO: 44.7 % (ref 35–47)
HGB BLD-MCNC: 14 G/DL (ref 11.7–15.7)
HOLD SPECIMEN: NORMAL
IMM GRANULOCYTES # BLD: 0.1 10E3/UL
IMM GRANULOCYTES NFR BLD: 1 %
LACTATE SERPL-SCNC: 1.6 MMOL/L (ref 0.7–2)
LYMPHOCYTES # BLD AUTO: 2 10E3/UL (ref 0.8–5.3)
LYMPHOCYTES NFR BLD AUTO: 26 %
MCH RBC QN AUTO: 30.4 PG (ref 26.5–33)
MCHC RBC AUTO-ENTMCNC: 31.3 G/DL (ref 31.5–36.5)
MCV RBC AUTO: 97 FL (ref 78–100)
MONOCYTES # BLD AUTO: 1.3 10E3/UL (ref 0–1.3)
MONOCYTES NFR BLD AUTO: 17 %
NEUTROPHILS # BLD AUTO: 3.2 10E3/UL (ref 1.6–8.3)
NEUTROPHILS NFR BLD AUTO: 41 %
NRBC # BLD AUTO: 0 10E3/UL
NRBC BLD AUTO-RTO: 0 /100
PLATELET # BLD AUTO: 264 10E3/UL (ref 150–450)
POTASSIUM SERPL-SCNC: 3.9 MMOL/L (ref 3.4–5.3)
PROT SERPL-MCNC: 7.3 G/DL (ref 6.4–8.3)
RBC # BLD AUTO: 4.61 10E6/UL (ref 3.8–5.2)
SODIUM SERPL-SCNC: 139 MMOL/L (ref 136–145)
WBC # BLD AUTO: 7.7 10E3/UL (ref 4–11)

## 2023-02-26 PROCEDURE — 74177 CT ABD & PELVIS W/CONTRAST: CPT

## 2023-02-26 PROCEDURE — 80053 COMPREHEN METABOLIC PANEL: CPT | Performed by: EMERGENCY MEDICINE

## 2023-02-26 PROCEDURE — 85004 AUTOMATED DIFF WBC COUNT: CPT | Performed by: EMERGENCY MEDICINE

## 2023-02-26 PROCEDURE — 250N000011 HC RX IP 250 OP 636: Performed by: EMERGENCY MEDICINE

## 2023-02-26 PROCEDURE — 99285 EMERGENCY DEPT VISIT HI MDM: CPT | Mod: 25

## 2023-02-26 PROCEDURE — 83605 ASSAY OF LACTIC ACID: CPT | Performed by: EMERGENCY MEDICINE

## 2023-02-26 PROCEDURE — 250N000009 HC RX 250: Performed by: EMERGENCY MEDICINE

## 2023-02-26 PROCEDURE — 93971 EXTREMITY STUDY: CPT | Mod: RT

## 2023-02-26 PROCEDURE — 36415 COLL VENOUS BLD VENIPUNCTURE: CPT | Performed by: EMERGENCY MEDICINE

## 2023-02-26 RX ORDER — IOPAMIDOL 755 MG/ML
500 INJECTION, SOLUTION INTRAVASCULAR ONCE
Status: COMPLETED | OUTPATIENT
Start: 2023-02-26 | End: 2023-02-26

## 2023-02-26 RX ADMIN — SODIUM CHLORIDE 100 ML: 9 INJECTION, SOLUTION INTRAVENOUS at 10:30

## 2023-02-26 RX ADMIN — IOPAMIDOL 100 ML: 755 INJECTION, SOLUTION INTRAVENOUS at 10:30

## 2023-02-26 ASSESSMENT — ENCOUNTER SYMPTOMS
COUGH: 0
NAUSEA: 0
VOMITING: 0
ABDOMINAL PAIN: 0
HEADACHES: 0
CHILLS: 0
SHORTNESS OF BREATH: 0
EYE PAIN: 0
DYSURIA: 0
BACK PAIN: 0
RHINORRHEA: 0
COLOR CHANGE: 0
DIZZINESS: 0
FEVER: 0
NECK PAIN: 0
HEMATURIA: 0

## 2023-02-26 ASSESSMENT — ACTIVITIES OF DAILY LIVING (ADL)
ADLS_ACUITY_SCORE: 35
ADLS_ACUITY_SCORE: 35

## 2023-02-26 NOTE — ED TRIAGE NOTES
Pt reports groin swelling that started 3 months ago. Pt had a lymph node removed from that same groin 18 months ago. Pt reports the swelling is getting worse. Its black and blue in color

## 2023-02-26 NOTE — ED PROVIDER NOTES
History     Chief Complaint:  Groin Swelling       The history is provided by the patient.      Jessica Qiu is a 72 year old female on Xarelto with a history of breast cancer, DVT, and PE who presents with groin swelling. Patient reports she was seen for a growth in the same inguinal area of enlarged lymph node and had a surgical removal of such mass 18 months ago, with negative biopsy results. However as of the last month, she has been experiencing new growth that is much more significant than prior. This time, the growth is spreading up into her pelvic region, which she notes did not happen last time. Sitting up presents some discomfort.  Endorses numbness under the swelling, but no numbness elsewhere. Denies fever, cough, sore throat, chest pain, shortness of breath, dysuria, urinary retention, hematuria, GI symptoms, or any tingling. She has not started treatment for her breast cancer yet. Patient states that her oncologist states that prior mass unlikely to be related to her breast cancer; however, patient states her oncologist has not seen this mass in quite some time.    Independent Historian:   None - Patient Only    Review of External Notes: 2/20/23 oncology office visit note     ROS:  Review of Systems   Constitutional: Negative for chills and fever.   HENT: Negative for congestion and rhinorrhea.    Eyes: Negative for pain and visual disturbance.   Respiratory: Negative for cough and shortness of breath.    Cardiovascular: Negative for chest pain.   Gastrointestinal: Negative for abdominal pain, nausea and vomiting.   Genitourinary: Negative for decreased urine volume, dysuria and hematuria.   Musculoskeletal: Negative for back pain and neck pain.        Right groin mass/swelling   Skin: Negative for color change and pallor.   Neurological: Negative for dizziness and headaches.   All other systems reviewed and are negative.    Allergies:  Hmg-Coa-R Inhibitors  Pollen Extract     Medications:   "  allopurinol   citalopram   diphenhydrAMINE  XARELTO ANTICOAGULANT 10 MG TABS tablet    Past Medical History:    Breast cancer  Nephrolithiasis  Gait disorder  PE  Allergic conjunctivitis   Malignant neoplasm of upper-outer quadrant   Factor V Leiden mutation    shunt   Aortic valve regurgitation   Hypercholesterolemia   Anxiety  Lymphadenopathy   Cataract   Colon polyp   Ureteral stone   Posterior vitreous detachment of L eye  Migraine  Hyperlipidemia   Hydrocephalus   Osteopenia   Clotting disorder    Past Surgical History:    Breast biopsy   Kidney stone sx  Tonsillectomy   IR lower extrem venogram left   Lithotripsy   Lymph note dissection  Brain surgery (shunt)  Cataract removal (x2)    Family History:    family history includes Cancer - colorectal (age of onset: 82) in her father; Cerebrovascular Disease (age of onset: 38) in her mother.    Social History:  Presents with spouse   PCP: Burke Garcia     Physical Exam     Patient Vitals for the past 24 hrs:   BP Temp Temp src Pulse Resp SpO2 Height Weight   02/26/23 1400 129/68 -- -- -- 16 98 % -- --   02/26/23 1143 129/66 -- -- -- -- 97 % -- --   02/26/23 1049 120/62 -- -- -- -- -- -- --   02/26/23 0947 (!) 143/85 -- -- 71 -- 98 % -- --   02/26/23 0446 (!) 145/86 98.1  F (36.7  C) Temporal 90 16 98 % 1.626 m (5' 4\") 90.7 kg (200 lb)        Physical Exam  Constitutional:       General: Not in acute distress.        Appearance: Normal appearance.   HENT:      Head: Normocephalic and atraumatic.   Eyes:      Extraocular Movements: Extraocular movements intact.      Conjunctiva/sclera: Conjunctivae normal.   Cardiovascular:      Rate and Rhythm: Normal rate and regular rhythm.   Pulmonary:      Effort: Pulmonary effort is normal. No respiratory distress.      Breath sounds: Normal breath sounds.   Abdominal:      General: Abdomen is flat. There is no distension.      Palpations: Abdomen is soft.  Right inguinal region nodularity with large right upper inner " thigh/groin mass.  Mildly tender to palpation.  No overlying erythema or warmth.  No pulsation.  No ulceration.  (See picture below)     Tenderness: There is no abdominal tenderness.   Musculoskeletal:      Cervical back: Normal range of motion. No rigidity.      Right lower leg: No edema. 2+ R doraslis pedis pulse. Cap refill less than 2 seconds. Sensation intact, moving toes and extremities with ease. No focal deficits      Left lower leg: No edema.   Skin:     General: Skin is warm and dry.   Neurological:      General: No focal deficit present.      Mental Status: Alert and oriented to person, place, and time.   Psychiatric:         Mood and Affect: Mood normal.         Behavior: Behavior normal.             Emergency Department Course     Imaging:  CT Abdomen Pelvis w Contrast   Final Result   Addendum (preliminary) 1 of 1   Additional images of the pelvis have been made available.      The dominant conglomerate of enlarged right inguinal lymph nodes measures    8.5 x 6.5 x 12.0 cm (8/#45, 10/#97). There is localized edema of the    surrounding subcutaneous fat.       No fluid collections or other abnormalities in the upper thighs.      These results were discussed with Dr. Willis on 2/26/2023 2:11 PM by Dr. Markie Huizar.      Final   IMPRESSION:       1.  Partially visualized conglomerate of enlarged right inguinal lymph nodes measuring up to 4.5 cm in short axis, with additional mildly enlarged right external iliac chain lymph nodes. Primary differential consideration is lymphoma; noemi metastases    are also possible. The dominant noemi conglomerate would be amenable to image guided percutaneous biopsy.      2.  No splenomegaly.      3.  No additional evidence of metastatic disease in the abdomen or pelvis.      US Lower Extremity Venous Duplex Right   Final Result   IMPRESSION:   1.  No deep venous thrombosis in the right lower extremity.   2.  Complex mass in the region of palpable abnormality. Difficult to  tell if this is a cluster of abnormal lymph nodes versus a larger, complex mass.         Report per radiology    Laboratory:  Labs Ordered and Resulted from Time of ED Arrival to Time of ED Departure   COMPREHENSIVE METABOLIC PANEL - Abnormal       Result Value    Sodium 139      Potassium 3.9      Chloride 102      Carbon Dioxide (CO2) 24      Anion Gap 13      Urea Nitrogen 17.1      Creatinine 0.81      Calcium 9.1      Glucose 115 (*)     Alkaline Phosphatase 77      AST 22      ALT 25      Protein Total 7.3      Albumin 4.3      Bilirubin Total 0.3      GFR Estimate 76     CBC WITH PLATELETS AND DIFFERENTIAL - Abnormal    WBC Count 7.7      RBC Count 4.61      Hemoglobin 14.0      Hematocrit 44.7      MCV 97      MCH 30.4      MCHC 31.3 (*)     RDW 13.7      Platelet Count 264      % Neutrophils 41      % Lymphocytes 26      % Monocytes 17      % Eosinophils 13      % Basophils 2      % Immature Granulocytes 1      NRBCs per 100 WBC 0      Absolute Neutrophils 3.2      Absolute Lymphocytes 2.0      Absolute Monocytes 1.3      Absolute Eosinophils 1.0 (*)     Absolute Basophils 0.2      Absolute Immature Granulocytes 0.1      Absolute NRBCs 0.0     LACTIC ACID WHOLE BLOOD - Normal    Lactic Acid 1.6        Emergency Department Course & Assessments:    Interventions:  Medications   sodium chloride for CT scan flush use (100 mLs Intravenous $Given 2/26/23 1030)   iopamidol (ISOVUE-370) solution 500 mL (100 mLs Intravenous $Given 2/26/23 1030)      Independent Interpretation (X-rays, CTs, rhythm strip):  None    Assessments/Consultations/Discussion of Management or Tests:   ED Course as of 02/26/23 1809   Sun Feb 26, 2023   0940 US Lower Extremity Venous Duplex Right  1.  No deep venous thrombosis in the right lower extremity.  2.  Complex mass in the region of palpable abnormality. Difficult to tell if this is a cluster of abnormal lymph nodes versus a larger, complex mass.   0949 My initial evaluation   1110 CT  Abdomen Pelvis w Contrast    Partially visualized conglomerate of enlarged right inguinal lymph nodes measuring up to 4.5 cm in short axis, with additional mildly enlarged right external iliac chain lymph nodes. Primary differential consideration is lymphoma; noemi metastases   are also possible. The dominant noemi conglomerate would be amenable to image guided percutaneous biopsy.   1301 Rechecked and updated patient and  regarding image findings and plan for additional imaging given incomplete capture.  Patient states that she already has an oncologist that she can follow-up with regarding this and does not need additional oncology referral at this time.  We will update patient once completion of imaging.   1409 CT Abdomen Pelvis w Contrast  Discussed final radiology read with radiologist.  Unchanged in terms of interpretation.  Solid mass concerning for malignancy.  No underlying abscess or fluid collection or infection.  Radiologist will put addendum and for final read on size.   1415 Updated patient on image findings.  Discussed return precautions.  Answered all questions.  Patient and  voiced understanding and agreement with plan.     Social Determinants of Health affecting care:   None    Disposition:  The patient was discharged to home.     Impression & Plan      CMS Diagnoses: None     Medical Decision Makin-year-old female as described above presents to the emergency department with right inguinal mass and tenderness.  Patient hemodynamically stable at time evaluation.  Afebrile.  Recent diagnosis of breast cancer with left-sided axillary lymph node biopsy.  Patient has history of DVT/factor V Leiden and is currently taking Xarelto.  Patient recently had lymph node biopsy of the right groin 18 months ago for a minor lymphadenopathy, but states that this mass has rapidly progressed just in the past month.  On examination, patient has hard nodular mass in the right inguinal region with  extension into the right inguinal canal and down right thigh.  Mild light erythema of the right inner thigh.  No overlying cellulitis.  Lab work not suggestive of infection.  Nonetheless, given rapid progressive spread and cancer history, will order CT abdomen pelvis with contrast with extension into the right thigh for better evaluation of this complex mass.  No DVT on right lower extremity duplex, but abnormal lymph nodes/potential complex mass seen.  Differential diagnosis considered includes, but not limited to, malignant mass, calcified hematoma, AV fistula, and lymphangitis.  No indications for antibiotics at this time.  Discussed care plan with patient who voiced understanding and agreement with plan.  Answered all questions.  Additional work-up and orders as listed in chart.     Please refer to ED course above for details on the patient's treatment course and any changes or updates in care plan beyond my initial evaluation and MDM.    Diagnosis:    ICD-10-CM    1. Mass of right inguinal region  R19.09          Scribe Disclosure:  PATY HOLLEY, am serving as a scribe at 10:17 AM on 2/26/2023 to document services personally performed by Michael Willis DO based on my observations and the provider's statements to me.   2/26/2023   Michael Willis DO Yeh, Ferris, DO  02/26/23 1804

## 2023-03-01 ENCOUNTER — OFFICE VISIT (OUTPATIENT)
Dept: OTHER | Facility: CLINIC | Age: 73
End: 2023-03-01
Attending: INTERNAL MEDICINE
Payer: COMMERCIAL

## 2023-03-01 VITALS
DIASTOLIC BLOOD PRESSURE: 84 MMHG | SYSTOLIC BLOOD PRESSURE: 151 MMHG | HEART RATE: 60 BPM | OXYGEN SATURATION: 99 % | HEIGHT: 64 IN | BODY MASS INDEX: 34.15 KG/M2 | WEIGHT: 200 LBS

## 2023-03-01 DIAGNOSIS — E78.5 DYSLIPIDEMIA, GOAL LDL BELOW 70: ICD-10-CM

## 2023-03-01 DIAGNOSIS — I77.810 MILD DILATION OF ASCENDING AORTA (H): ICD-10-CM

## 2023-03-01 DIAGNOSIS — Z86.711 HISTORY OF PULMONARY EMBOLISM: ICD-10-CM

## 2023-03-01 DIAGNOSIS — I77.810 AORTIC ROOT DILATATION (H): ICD-10-CM

## 2023-03-01 DIAGNOSIS — I82.512 CHRONIC DEEP VEIN THROMBOSIS (DVT) OF FEMORAL VEIN OF LEFT LOWER EXTREMITY (H): Primary | ICD-10-CM

## 2023-03-01 DIAGNOSIS — I35.1 AORTIC VALVE INSUFFICIENCY, ETIOLOGY OF CARDIAC VALVE DISEASE UNSPECIFIED: ICD-10-CM

## 2023-03-01 DIAGNOSIS — R19.09 GROIN LUMP: ICD-10-CM

## 2023-03-01 DIAGNOSIS — D68.51 FACTOR 5 LEIDEN MUTATION, HETEROZYGOUS (H): ICD-10-CM

## 2023-03-01 PROCEDURE — G0463 HOSPITAL OUTPT CLINIC VISIT: HCPCS

## 2023-03-01 PROCEDURE — 99215 OFFICE O/P EST HI 40 MIN: CPT | Performed by: INTERNAL MEDICINE

## 2023-03-01 NOTE — PROGRESS NOTES
SUBJECTIVE:  CC:   Follow-up visit, multiple concerns  Large right groin lump previously lymph node removed in October 2021  Seen in the ER  She underwent ultrasound of the leg no DVT and CT scan of the abdomen pelvis which showed large right groin lymph node confluence  Taking Xarelto 10 daily  She developed breast cancer underwent lumpectomy recently  HPI:  Jessica Qiu is a 72 year old female with history of extensive deep venous thrombosis of iliac, femoral vein of left lower extremity she underwent successful mechanical thrombectomy in May 2020 then followed by initiated full dose Xarelto,  after 6 months decreased the dose to 10 mg daily and multiple occasions D-dimer was good range, tolerating medications without any problems   She developed breast cancer underwent lumpectomy recently  She also underwent a right groin lymph node removal in 2021 now recurred increase in size  She has a history of aortic root dilatation, aortic valve insufficiency currently following up with cardiologist and recent echocardiogram stable unchanged    She accompanied by her daughter and  for this visit today  HISTORIES:  PROBLEM LIST:   Patient Active Problem List   Diagnosis     Gait disorder     Cognitive decline     Nephrolithiasis     Hyperlipidemia with target LDL less than 130     Other idiopathic scoliosis, thoracolumbar region- left sided      Hydrocephalus (H)     Handicap Parking through 7/2018   shunt placed 3/24/2016 @ [facility] for hydrocephalus; using walker , risk for falls     Aortic valve insufficiency, unspecified etiology     Depression with anxiety     Foot drop, bilateral     Short Achilles tendon, unspecified laterality     DVT (deep vein thrombosis) in pregnancy     PAST MEDICAL HISTORY:  Past Medical History:   Diagnosis Date     Cognitive decline 12/4/2014     Gait disorder 12/4/2014     Nephrolithiasis 12/4/2014     PAST SURGICAL HISTORY:  Past Surgical History:   Procedure Laterality Date      BREAST SURGERY      breast biopsy     GENITOURINARY SURGERY      kidney stones     HC REMOVAL OF TONSILS,<13 Y/O      Description: Tonsillectomy;  Recorded: 03/19/2008;     IR LOWER EXTREMITY VENOGRAM LEFT  5/28/2020     LITHOTRIPSY  2010    uric acid stone     TONSILLECTOMY      childhood     CURRENT MEDICATIONS:  Current Outpatient Medications   Medication Sig Dispense Refill     acetaminophen (TYLENOL) 500 MG tablet Take 500-1,000 mg by mouth every 6 hours as needed for mild pain       allopurinol (ZYLOPRIM) 300 MG tablet Take 1 tablet (300 mg) by mouth daily 90 tablet 3     calcium carbonate (OS-NICKOLAS) 500 MG tablet Take 1 tablet by mouth 2 times daily       cholecalciferol (VITAMIN D3) 25 mcg (1000 units) capsule Take 1,000 Units by mouth       citalopram (CELEXA) 20 MG tablet TAKE 1 TABLET BY MOUTH ONE TIME DAILY (Patient taking differently: Take 20 mg by mouth every morning) 30 tablet 0     diphenhydrAMINE (BENADRYL) 25 MG tablet Take 25 mg by mouth 2 times daily as needed for allergies        rivaroxaban ANTICOAGULANT (XARELTO ANTICOAGULANT) 10 MG TABS tablet Take 1 tablet (10 mg) by mouth daily (with dinner) 90 tablet 3     ALLERGIES:  Allergies   Allergen Reactions     Hmg-Coa-R Inhibitors Muscle Pain (Myalgia)     Muscle pain and stiffness     Pollen Extract      Pt. Has hayfever     SOCIAL HISTORY:  Social History     Socioeconomic History     Marital status:      Spouse name: Not on file     Number of children: Not on file     Years of education: Not on file     Highest education level: Not on file   Occupational History     Not on file   Tobacco Use     Smoking status: Never     Smokeless tobacco: Never   Substance and Sexual Activity     Alcohol use: Yes     Comment: socailly     Drug use: No     Sexual activity: Yes     Partners: Male   Other Topics Concern     Parent/sibling w/ CABG, MI or angioplasty before 65F 55M? No   Social History Narrative     Not on file     Social Determinants of  "Health     Financial Resource Strain: Not on file   Food Insecurity: Not on file   Transportation Needs: Not on file   Physical Activity: Not on file   Stress: Not on file   Social Connections: Not on file   Intimate Partner Violence: Not on file   Housing Stability: Not on file     FAMILY HISTORY:  Family History   Problem Relation Age of Onset     Cerebrovascular Disease Mother 38        brain aneurysm     Cancer - colorectal Father 82     REVIEW OF SYSTEMS:  CONSTITUTIONAL:no malaise, fatigue, or other general symptoms  EYES: no subjective changes in visual acuity, no photophobia  ENT/MOUTH: no complaints of rhinorrhea, nasal congestion, sore throat, hearing changes  RESP:no SOB  CV: no c/o exertional chest pressure or DONALDSON  GI: No abdominal pain, constipation, change in bowel movements, nausea, pyrosis, BRBPR  Large right groin lump she underwent lymph node removal by Dr. Pastora Case in October 2021 now it is recurred and much larger size  :no polyuria or polydipsia, no dysuria, no gross hematuria  MUSCULOSKELATAL:no arthalgias or myalgias  INTEGUMENTARY/SKIN: no pruritis, rashes, or moles with recent change in size, shape, or pigmentation  NEURO: no gross sensory or motor symptoms, no dizziness, no confusion  ENDOCRINE: no polyuria or polydipsia, no heat or cold intolerance  HEME/ALLERGY/IMMUNE: no fevers, chills, night sweats, or unwanted weight loss  PSYCHIATRIC: no depression, anxiety, or internal stimuli  EXAM:  BP (!) 151/84 (BP Location: Right arm, Patient Position: Chair, Cuff Size: Adult Regular)   Pulse 60   Ht 5' 4\" (1.626 m)   Wt 200 lb (90.7 kg)   LMP  (LMP Unknown)   SpO2 99%   BMI 34.33 kg/m    BMI: Body mass index is 34.33 kg/m .  GENERAL APPEARANCE:  Pleasant  Healthy appearing male , alert, active, no distress cooperative.  EXAM:  EYES: clear conjunctiva, no cataracts, no obvious fundoscopic abnormalities  HENT: oropharynx, nares, and TMs are WNL  NECK: no JVD, thyromegaly or " lymphadenopathy, no cervical bruits  RESP: clear to auscultation without rales, wheezes, or rhonchi  CV: RRR, 2-3/6 AR murmur heard in the right second intercostal space  GI: NABS, ND/NT, no masses or organomegally appreciated  MS: no obvoius clinicallly relevant arthropathy, no evidence vasculitis  SKIN: no nevi clinically suspicious for malignancy are noted  NEURO: CN II-XII intact, no localizing sensory or motor abnoramlities noted, DTRs symmetrical bilaterally  PSYCH: Mental status exam reveals the pt to have normal mood and affect. There is no disorder of thought form or content. There is no response to internal stimuli. There is no suicidal or homicidal ideation.  Rt Groin: Large palpable lump in the right groin area which is irregular margins and mobile nontender  Right lower extremity slightly larger than left  Recent venous duplex no DVT in the right lower extremity  Addendum    Additional images of the pelvis have been made available.     The dominant conglomerate of enlarged right inguinal lymph nodes measures 8.5 x 6.5 x 12.0 cm (8/#45, 10/#97). There is localized edema of the surrounding subcutaneous fat.      No fluid collections or other abnormalities in the upper thighs.     These results were discussed with Dr. Willis on 2/26/2023 2:11 PM by Dr. Markie Huizar.   Addended by Markie Huizar MD on 2/26/2023  2:13 PM     Study Result    Narrative & Impression   EXAM: CT ABDOMEN PELVIS W CONTRAST  LOCATION: [facility]  DATE/TIME: 2/26/2023 10:34 AM     INDICATION: Right inguinal groin mass thigh swelling  COMPARISON: Ultrasound 02/26/2023.  TECHNIQUE: CT scan of the abdomen and pelvis was performed following injection of IV contrast. Multiplanar reformats were obtained. Dose reduction techniques were used.  CONTRAST: 100mL Isovue 370     FINDINGS:   LOWER CHEST: Dependent subsegmental atelectasis.     HEPATOBILIARY: Normal.     PANCREAS: Normal.     SPLEEN: No splenomegaly. No focal  splenic lesions.     ADRENAL GLANDS: Normal.     KIDNEYS/BLADDER: Tiny left renal cyst, which does not require follow-up. No hydronephrosis. Normal bladder.     BOWEL: Small duodenal diverticulum. No obstruction or inflammation. Scattered noninflamed sigmoid colonic diverticuli.     LYMPH NODES: Partially visualized conglomerate of enlarged right inguinal lymph nodes, measuring up to 8.1 x 4.5 cm (3/#205). Mildly enlarged right external iliac chain lymph nodes measuring up to 1.5 cm in short axis (3/#158). No other enlarged   abdominal or pelvic lymph node.     VASCULATURE: Patent portal, splenic, and superior mesenteric veins. Nonaneurysmal abdominal aorta with scattered mild atherosclerotic calcifications.     PELVIC ORGANS: Partially visualized ventriculoperitoneal shunt catheter tubing with tip in the left lower quadrant. Trace free pelvic fluid. Normal uterus.     MUSCULOSKELETAL: Tiny fat-containing periumbilical hernia. Severe lumbar levoscoliosis with multilevel degenerative changes of the spine. No destructive bone lesions.                                                                      IMPRESSION:      1.  Partially visualized conglomerate of enlarged right inguinal lymph nodes measuring up to 4.5 cm in short axis, with additional mildly enlarged right external iliac chain lymph nodes. Primary differential consideration is lymphoma; noemi metastases   are also possible. The dominant noemi conglomerate would be amenable to image guided percutaneous biopsy.     2.  No splenomegaly.     3.  No additional evidence of metastatic disease in the abdomen or pelvis       EXAM: US LOWER EXTREMITY VENOUS DUPLEX RIGHT  LOCATION: [facility]  DATE/TIME: 2/26/2023 5:53 AM     INDICATION: groin swelling  COMPARISON: 09/17/2021  TECHNIQUE: Venous Duplex ultrasound of the right lower extremity with and without compression, augmentation and duplex. Color flow and spectral Doppler with waveform  "analysis performed.     FINDINGS: Exam includes the common femoral, femoral, popliteal, and contralateral common femoral veins as well as segmentally visualized deep calf veins and greater saphenous vein.      RIGHT: No deep vein thrombosis. No superficial thrombophlebitis. No popliteal cyst. Complex masslike structure in the region of palpable abnormality measuring 10.5 x 9.2 x 5.1 cm. Difficult to tell if this is a cluster of lymph nodes versus a single   complex mass.                                                                      IMPRESSION:  1.  No deep venous thrombosis in the right lower extremity.  2.  Complex mass in the region of palpable abnormality. Difficult to tell if this is a cluster of abnormal lymph nodes versus a larger, complex mass.    A/P:  (I82.512) Hx of extensive  deep vein thrombosis (DVT) of , iliac and femoral vein of left lower extremity s/p [facility] thrombectomy 5/8/2020 (H)  (primary encounter diagnosis)  (D68.51) Factor 5 Leiden mutation, heterozygous (H)  (Z86.711) History of pulmonary embolism  Comment: She is doing well no recurrence of DVT in the left lower extremity currently on maintenance dose of Xarelto 10 mg daily continue the same use compression stockings and elevate the legs  Plan: rivaroxaban ANTICOAGULANT (XARELTO         ANTICOAGULANT) 10 MG TABS tablet            (R19.09) Groin lump, Rt s/p LN removal by Dr. Pastora del toro 10/2021 , recurred and large now    reviewed recent ER evaluation ultrasound and CT  \"IMPRESSION:   1.  Partially visualized conglomerate of enlarged right inguinal lymph nodes measuring up to 4.5 cm in short axis, with additional mildly enlarged right external iliac chain lymph nodes. Primary differential consideration is lymphoma; noemi metastases   are also possible. The dominant noemi conglomerate would be amenable to image guided percutaneous biopsy.   2.  No splenomegaly.   3.  No additional evidence of metastatic disease in the abdomen or " "pelvis.\"    She is scheduled to see Dr. Case on March 6, 2023, may need biopsy then followed by excision or additional treatments based on the biopsy results  Okay to hold Xarelto per surgeon's request before the biopsy or excision and restart once they clear      (I77.810) Aortic root dilatation (H)  (I77.810) Mild dilation of ascending aorta (H)  (I35.1) Aortic valve insufficiency, etiology of cardiac valve disease unspecified  Stable currently following up with the cardiologist continue the same and she will need periodic echocardiogram    (E78.5) Dyslipidemia, goal LDL below 70  Comment: TLC suggested  Repeat lipid panel through primary MD or cardiology clinic  Briana  Return to clinic in 6 months    40 minutes spent on the date of the encounter doing chart review, history, exam and documentation etc. Reviewed recent ER evaluation outside evaluation, review of ultrasound and CT results with the family and that a lot of questions all of them were answered,     Fco Schreiber MD, VERNELL, FSCAYLA, FNLA  Vascular medicine        "

## 2023-03-01 NOTE — PATIENT INSTRUCTIONS
Ok to hold xarelto for lymph node biopsy or excision per surgeon     New Rx xarelto 90 days with 3 refills sent     Use compression , elevate leg     Follow up in 6 months

## 2023-03-01 NOTE — PROGRESS NOTES
"Patient is here to discuss follow up    BP (!) 151/84 (BP Location: Right arm, Patient Position: Chair, Cuff Size: Adult Regular)   Pulse 60   Ht 5' 4\" (1.626 m)   Wt 200 lb (90.7 kg)   LMP  (LMP Unknown)   SpO2 99%   BMI 34.33 kg/m      Questions patient would like addressed today are: N/A.    Refills are needed: No    Has homecare services and agency name:  [facility]    "

## 2023-03-26 ENCOUNTER — HEALTH MAINTENANCE LETTER (OUTPATIENT)
Age: 73
End: 2023-03-26

## 2023-10-18 ENCOUNTER — OFFICE VISIT (OUTPATIENT)
Dept: CARDIOLOGY | Facility: CLINIC | Age: 73
End: 2023-10-18
Payer: COMMERCIAL

## 2023-10-18 VITALS
BODY MASS INDEX: 33.57 KG/M2 | SYSTOLIC BLOOD PRESSURE: 112 MMHG | HEART RATE: 106 BPM | DIASTOLIC BLOOD PRESSURE: 70 MMHG | WEIGHT: 196.6 LBS | OXYGEN SATURATION: 98 % | HEIGHT: 64 IN

## 2023-10-18 DIAGNOSIS — I35.1 NONRHEUMATIC AORTIC VALVE INSUFFICIENCY: Primary | ICD-10-CM

## 2023-10-18 DIAGNOSIS — I77.810 ASCENDING AORTA DILATION (H): ICD-10-CM

## 2023-10-18 PROCEDURE — 99214 OFFICE O/P EST MOD 30 MIN: CPT | Performed by: PHYSICIAN ASSISTANT

## 2023-10-18 RX ORDER — POTASSIUM CHLORIDE 1500 MG/1
1 TABLET, EXTENDED RELEASE ORAL DAILY
COMMUNITY
Start: 2023-05-30 | End: 2024-05-29

## 2023-10-18 RX ORDER — LETROZOLE 2.5 MG/1
2.5 TABLET, FILM COATED ORAL DAILY
COMMUNITY
Start: 2023-10-18

## 2023-10-18 RX ORDER — FUROSEMIDE 20 MG
20 TABLET ORAL DAILY
COMMUNITY
Start: 2023-08-28 | End: 2024-08-27

## 2023-10-18 NOTE — PROGRESS NOTES
CARDIOLOGY CLINIC PROGRESS NOTE    DOS: 10/18/2023      Jessica Qiu  : 1950, 72 year old  MRN: [id number]      History: 72-year-old female with history of aortic insufficiency, dilated ascending aorta, she has factor V leiden with LLE DVT and PE 2020, invasive lobular carcinoma of the right breast s/p lumpectomy 2023, Low-grade follicular lymphoma diagnosed 3/2023, lymphedema, hydrocephalus.     Echocardiogram May 2008 at [facility] showed preserved ejection fraction, trace aortic insufficiency, positive bubble study, aorta 4.75 cm.  Thoracic MRA showed ascending aorta 4.6 x 4.3 cm, arch 2.7 cm, and descending aorta 2.5 cm.       Echo 2017 showed normal left ventricular size, ejection fraction 60%, trileaflet aortic valve, 2+ AI, aorta 4.6 cm.        Echo 2018 showed EF 60%, normal RV, mild to moderate aortic insufficiency, ascending aorta 4.6 cm.      Echo 2021 showed EF 60%, normal RV, 3+ AI, aorta 4.4 cm.      Echo 2022 showed EF 55%, 1-2+ AI, aorta 4.5 cm.       Diagnosed with invasive lobular carcinoma of the right breast s/p lumpectomy 2023.     Diagnosed with follicular lymphoma 3/2023 s/p chemo with complete response on interim PET scan 2023.     She saw [facility] onc in early December and started Lasix 20 mg for lymphedema.     She presents today for follow up.   She had edema to her knees.  The Lasix is working. Her edema is much improved. She can get her shoes on.   She also has compression.   BP controlled 112/70.   She has some pinching type chest pains.  These happen at night mostly, but can be any time in the day.  She does not notice it specifically with exertion.    Some DONALDSON.  She is increasing her conditioning and doing better. She can walk 20-30 minutes.   Very infrequent palpitations, maybe every couple days, does not last long.   Negative stress echo in .       ROS:  Skin:        Eyes:       ENT:       Respiratory:  Positive for dyspnea on  exertion  Cardiovascular:    palpitations;Positive for;chest pain;edema;lightheadedness  Gastroenterology:      Genitourinary:       Musculoskeletal:       Neurologic:       Psychiatric:       Heme/Lymph/Imm:       Endocrine:         PAST MEDICAL HISTORY:  Past Medical History:   Diagnosis Date    Cognitive decline 12/4/2014    Gait disorder 12/4/2014    Nephrolithiasis 12/4/2014       PAST SURGICAL HISTORY:  Past Surgical History:   Procedure Laterality Date    BREAST SURGERY      breast biopsy    GENITOURINARY SURGERY      kidney stones    HC REMOVAL OF TONSILS,<13 Y/O      Description: Tonsillectomy;  Recorded: 03/19/2008;    IR LOWER EXTREMITY VENOGRAM LEFT  5/28/2020    LITHOTRIPSY  2010    uric acid stone    TONSILLECTOMY      childhood       SOCIAL HISTORY:  Social History     Socioeconomic History    Marital status:      Spouse name: None    Number of children: None    Years of education: None    Highest education level: None   Tobacco Use    Smoking status: Never    Smokeless tobacco: Never   Substance and Sexual Activity    Alcohol use: Yes     Comment: socially    Drug use: No    Sexual activity: Yes     Partners: Male   Other Topics Concern    Parent/sibling w/ CABG, MI or angioplasty before 65F 55M? No       FAMILY HISTORY:  Family History   Problem Relation Age of Onset    Cerebrovascular Disease Mother 38        brain aneurysm    Cancer - colorectal Father 82       MEDS: acetaminophen (TYLENOL) 500 MG tablet, Take 500-1,000 mg by mouth every 6 hours as needed for mild pain  allopurinol (ZYLOPRIM) 300 MG tablet, Take 1 tablet (300 mg) by mouth daily  calcium carbonate (OS-NICKOLAS) 500 MG tablet, Take 1 tablet by mouth 2 times daily  cholecalciferol (VITAMIN D3) 25 mcg (1000 units) capsule, Take 1,000 Units by mouth  citalopram (CELEXA) 20 MG tablet, TAKE 1 TABLET BY MOUTH ONE TIME DAILY (Patient taking differently: Take 20 mg by mouth every morning)  diphenhydrAMINE (BENADRYL) 25 MG tablet, Take 25  "mg by mouth 2 times daily as needed for allergies   furosemide (LASIX) 20 MG tablet, Take 20 mg by mouth daily  letrozole (FEMARA) 2.5 MG tablet, Take 1 tablet (2.5 mg) by mouth daily  potassium chloride ER (KLOR-CON M) 20 MEQ CR tablet, Take 1 tablet by mouth daily  rivaroxaban ANTICOAGULANT (XARELTO ANTICOAGULANT) 10 MG TABS tablet, Take 1 tablet (10 mg) by mouth daily (with dinner)    No current facility-administered medications on file prior to visit.      ALLERGIES:   Allergies   Allergen Reactions    Statins Muscle Pain (Myalgia)     Muscle pain and stiffness    Pollen Extract      Pt. Has hayfever       PHYSICAL EXAM:  Vitals: /70 (BP Location: Right arm, Patient Position: Sitting, Cuff Size: Adult Regular)   Pulse 106   Ht 1.626 m (5' 4\")   Wt 89.2 kg (196 lb 9.6 oz)   LMP  (LMP Unknown)   SpO2 98%   BMI 33.75 kg/m    Constitutional:  cooperative, alert and oriented, well developed, well nourished, in no acute distress        Skin:  warm and dry to the touch, no apparent skin lesions or masses noted        Head:  normocephalic, no masses or lesions        Eyes:  pupils equal and round;conjunctivae and lids unremarkable;sclera white        ENT:  no pallor or cyanosis, dentition good        Neck:  JVP normal        Respiratory:  clear to auscultation        Cardiac: regular rhythm;normal S1 and S2           diastolic murmur;grade 1;grade 2      GI:  abdomen soft;BS normoactive        Vascular:                                        Extremities and Musculoskeletal:  no deformities, clubbing, cyanosis, erythema observed;no edema        Neurological:  no gross motor deficits;affect appropriate            LABS/DATA:  I reviewed the following:  Echo 7/5/22:  Interpretation Summary  Mild aortic root dilatation, 3.9 cm. 3.5 cm last year.  The ascending aorta is Moderately dilated, 4.5 cm vs 4.4 cm last year.  There is mild to moderate (1-2+) aortic regurgitation, central jet, nl lv  size. apppears less " compared with last year's study.        ASSESSMENT:  72-year-old female seen for dilated aorta and aortic insufficiency.  Last echo 7/2022 was unchanged.  Aorta has been stable for over 10 years.         RECOMMENDATIONS:  1.  Dilated ascending aorta with aortic insufficiency  - Repeat echo in 2024     2.  BP controlled    3. Atypical chest pain, described as pinching, nonexertional  - Only ischemic eval was stress echo in 2024  - She will notify us if this worsens or becomes exertional     4. Lymphedema  - Now on Lasix 20 mg       Follow up:   Dr. Dumont with echo in 1 year      Elle Stoddard PA-C

## 2023-10-18 NOTE — LETTER
10/18/2023    Burke Garcia MD   [address],   [place] [address]    RE: Jessica PIERRE Lazarus       Dear Colleague,     I had the pleasure of seeing Jessica Qiu in the [facility].      CARDIOLOGY CLINIC PROGRESS NOTE    DOS: 10/18/2023      Jessica Qiu  : 1950, 72 year old  MRN: [id number]      History: 72-year-old female with history of aortic insufficiency, dilated ascending aorta, she has factor V leiden with LLE DVT and PE 2020, invasive lobular carcinoma of the right breast s/p lumpectomy 2023, Low-grade follicular lymphoma diagnosed 3/2023, lymphedema, hydrocephalus.     Echocardiogram May 2008 at [facility] showed preserved ejection fraction, trace aortic insufficiency, positive bubble study, aorta 4.75 cm.  Thoracic MRA showed ascending aorta 4.6 x 4.3 cm, arch 2.7 cm, and descending aorta 2.5 cm.       Echo 2017 showed normal left ventricular size, ejection fraction 60%, trileaflet aortic valve, 2+ AI, aorta 4.6 cm.        Echo 2018 showed EF 60%, normal RV, mild to moderate aortic insufficiency, ascending aorta 4.6 cm.      Echo 2021 showed EF 60%, normal RV, 3+ AI, aorta 4.4 cm.      Echo 2022 showed EF 55%, 1-2+ AI, aorta 4.5 cm.       Diagnosed with invasive lobular carcinoma of the right breast s/p lumpectomy 2023.     Diagnosed with follicular lymphoma 3/2023 s/p chemo with complete response on interim PET scan 2023.     She saw [facility] onc in early December and started Lasix 20 mg for lymphedema.     She presents today for follow up.   She had edema to her knees.  The Lasix is working. Her edema is much improved. She can get her shoes on.   She also has compression.   BP controlled 112/70.   She has some pinching type chest pains.  These happen at night mostly, but can be any time in the day.  She does not notice it specifically with exertion.    Some DONALDSON.  She is increasing her conditioning and doing better. She can walk 20-30  minutes.   Very infrequent palpitations, maybe every couple days, does not last long.   Negative stress echo in 2024.       ROS:  Skin:        Eyes:       ENT:       Respiratory:  Positive for dyspnea on exertion  Cardiovascular:    palpitations;Positive for;chest pain;edema;lightheadedness  Gastroenterology:      Genitourinary:       Musculoskeletal:       Neurologic:       Psychiatric:       Heme/Lymph/Imm:       Endocrine:         PAST MEDICAL HISTORY:  Past Medical History:   Diagnosis Date    Cognitive decline 12/4/2014    Gait disorder 12/4/2014    Nephrolithiasis 12/4/2014       PAST SURGICAL HISTORY:  Past Surgical History:   Procedure Laterality Date    BREAST SURGERY      breast biopsy    GENITOURINARY SURGERY      kidney stones    HC REMOVAL OF TONSILS,<13 Y/O      Description: Tonsillectomy;  Recorded: 03/19/2008;    IR LOWER EXTREMITY VENOGRAM LEFT  5/28/2020    LITHOTRIPSY  2010    uric acid stone    TONSILLECTOMY      childhood       SOCIAL HISTORY:  Social History     Socioeconomic History    Marital status:      Spouse name: None    Number of children: None    Years of education: None    Highest education level: None   Tobacco Use    Smoking status: Never    Smokeless tobacco: Never   Substance and Sexual Activity    Alcohol use: Yes     Comment: socially    Drug use: No    Sexual activity: Yes     Partners: Male   Other Topics Concern    Parent/sibling w/ CABG, MI or angioplasty before 65F 55M? No       FAMILY HISTORY:  Family History   Problem Relation Age of Onset    Cerebrovascular Disease Mother 38        brain aneurysm    Cancer - colorectal Father 82       MEDS: acetaminophen (TYLENOL) 500 MG tablet, Take 500-1,000 mg by mouth every 6 hours as needed for mild pain  allopurinol (ZYLOPRIM) 300 MG tablet, Take 1 tablet (300 mg) by mouth daily  calcium carbonate (OS-NICKOLAS) 500 MG tablet, Take 1 tablet by mouth 2 times daily  cholecalciferol (VITAMIN D3) 25 mcg (1000 units) capsule, Take  "1,000 Units by mouth  citalopram (CELEXA) 20 MG tablet, TAKE 1 TABLET BY MOUTH ONE TIME DAILY (Patient taking differently: Take 20 mg by mouth every morning)  diphenhydrAMINE (BENADRYL) 25 MG tablet, Take 25 mg by mouth 2 times daily as needed for allergies   furosemide (LASIX) 20 MG tablet, Take 20 mg by mouth daily  letrozole (FEMARA) 2.5 MG tablet, Take 1 tablet (2.5 mg) by mouth daily  potassium chloride ER (KLOR-CON M) 20 MEQ CR tablet, Take 1 tablet by mouth daily  rivaroxaban ANTICOAGULANT (XARELTO ANTICOAGULANT) 10 MG TABS tablet, Take 1 tablet (10 mg) by mouth daily (with dinner)    No current facility-administered medications on file prior to visit.      ALLERGIES:   Allergies   Allergen Reactions    Statins Muscle Pain (Myalgia)     Muscle pain and stiffness    Pollen Extract      Pt. Has hayfever       PHYSICAL EXAM:  Vitals: /70 (BP Location: Right arm, Patient Position: Sitting, Cuff Size: Adult Regular)   Pulse 106   Ht 1.626 m (5' 4\")   Wt 89.2 kg (196 lb 9.6 oz)   LMP  (LMP Unknown)   SpO2 98%   BMI 33.75 kg/m    Constitutional:  cooperative, alert and oriented, well developed, well nourished, in no acute distress        Skin:  warm and dry to the touch, no apparent skin lesions or masses noted        Head:  normocephalic, no masses or lesions        Eyes:  pupils equal and round;conjunctivae and lids unremarkable;sclera white        ENT:  no pallor or cyanosis, dentition good        Neck:  JVP normal        Respiratory:  clear to auscultation        Cardiac: regular rhythm;normal S1 and S2           diastolic murmur;grade 1;grade 2      GI:  abdomen soft;BS normoactive        Vascular:                                        Extremities and Musculoskeletal:  no deformities, clubbing, cyanosis, erythema observed;no edema        Neurological:  no gross motor deficits;affect appropriate            LABS/DATA:  I reviewed the following:  Echo 7/5/22:  Interpretation Summary  Mild aortic root " dilatation, 3.9 cm. 3.5 cm last year.  The ascending aorta is Moderately dilated, 4.5 cm vs 4.4 cm last year.  There is mild to moderate (1-2+) aortic regurgitation, central jet, nl lv  size. apppears less compared with last year's study.        ASSESSMENT:  72-year-old female seen for dilated aorta and aortic insufficiency.  Last echo 7/2022 was unchanged.  Aorta has been stable for over 10 years.         RECOMMENDATIONS:  1.  Dilated ascending aorta with aortic insufficiency  - Repeat echo in 2024     2.  BP controlled    3. Atypical chest pain, described as pinching, nonexertional  - Only ischemic eval was stress echo in 2024  - She will notify us if this worsens or becomes exertional     4. Lymphedema  - Now on Lasix 20 mg       Follow up:   Dr. Dumont with echo in 1 year      Elle Stoddard PA-C      Thank you for allowing me to participate in the care of your patient.      Sincerely,     Elle Stoddard PA-C     [facility]  cc:   No referring provider defined for this encounter.

## 2024-01-16 NOTE — TELEPHONE ENCOUNTER
Patient is requesting to speak to nurse, she currently have lump on right top leg. She states that it is not painful and wondering if it is a blod clot.    Informed will send message to nurse to call back.       Mariella PORTILLO    yesterday

## 2024-03-14 DIAGNOSIS — I82.512 CHRONIC DEEP VEIN THROMBOSIS (DVT) OF FEMORAL VEIN OF LEFT LOWER EXTREMITY (H): ICD-10-CM

## 2024-03-14 DIAGNOSIS — Z86.711 HISTORY OF PULMONARY EMBOLISM: ICD-10-CM

## 2024-03-14 RX ORDER — RIVAROXABAN 10 MG/1
TABLET, FILM COATED ORAL
Qty: 90 TABLET | Refills: 0 | OUTPATIENT
Start: 2024-03-14

## 2024-03-14 NOTE — TELEPHONE ENCOUNTER
[facility]    Who is the name of the provider?:  ARNOLDO BAGLEY   What is the location you see this provider at/preferred location?: [place]  Person calling / Facility: Jessica Qiu  Phone number:  [phone] (home)  Nurse call back needed:  Yes     Reason for call:  Needs a prescription refill for xarelto - she is completely out -She use to get 90 tablets at a time - she got a partial on the last refill - she needs a new refill for the full 90 day-    Pharmacy location:  [facility] [address] [place] [address]  Outside Imaging: n/a   Can we leave a detailed message on this number?  YES     3/14/2024, 1:33 PM

## 2024-03-15 NOTE — TELEPHONE ENCOUNTER
Patient needs an appointment and stated in refusal of medication.  Please call patient to schedule and once scheduled route back to nursing to refill enough of medication until appointment.  Mychart sent to patient explaining as well.   New follow up order placed as old one was .    Veronique ELLIS, RN    [facility]  Office: [phone]  Fax: [phone]

## 2024-03-18 NOTE — TELEPHONE ENCOUNTER
Future Appointments   Date Time Provider Department Center   3/20/2024  2:00 PM Fco Schreiber MD [facility]     30 day Rx provided per TORB.

## 2024-03-20 ENCOUNTER — OFFICE VISIT (OUTPATIENT)
Dept: OTHER | Facility: CLINIC | Age: 74
End: 2024-03-20
Attending: INTERNAL MEDICINE
Payer: COMMERCIAL

## 2024-03-20 VITALS
SYSTOLIC BLOOD PRESSURE: 123 MMHG | HEART RATE: 66 BPM | HEIGHT: 64 IN | WEIGHT: 189.4 LBS | BODY MASS INDEX: 32.33 KG/M2 | OXYGEN SATURATION: 99 % | DIASTOLIC BLOOD PRESSURE: 77 MMHG

## 2024-03-20 DIAGNOSIS — I77.810 AORTIC ROOT DILATATION (H): ICD-10-CM

## 2024-03-20 DIAGNOSIS — I82.512 CHRONIC DEEP VEIN THROMBOSIS (DVT) OF FEMORAL VEIN OF LEFT LOWER EXTREMITY (H): Primary | ICD-10-CM

## 2024-03-20 DIAGNOSIS — Z86.711 HISTORY OF PULMONARY EMBOLISM: ICD-10-CM

## 2024-03-20 DIAGNOSIS — D68.51 FACTOR 5 LEIDEN MUTATION, HETEROZYGOUS (H): ICD-10-CM

## 2024-03-20 DIAGNOSIS — I35.1 AORTIC VALVE INSUFFICIENCY, ETIOLOGY OF CARDIAC VALVE DISEASE UNSPECIFIED: ICD-10-CM

## 2024-03-20 DIAGNOSIS — E78.5 DYSLIPIDEMIA, GOAL LDL BELOW 70: ICD-10-CM

## 2024-03-20 DIAGNOSIS — I77.810 MILD DILATION OF ASCENDING AORTA (H): ICD-10-CM

## 2024-03-20 PROCEDURE — 99215 OFFICE O/P EST HI 40 MIN: CPT | Performed by: INTERNAL MEDICINE

## 2024-03-20 PROCEDURE — G0463 HOSPITAL OUTPT CLINIC VISIT: HCPCS | Performed by: INTERNAL MEDICINE

## 2024-03-20 PROCEDURE — G2211 COMPLEX E/M VISIT ADD ON: HCPCS | Performed by: INTERNAL MEDICINE

## 2024-03-20 RX ORDER — ROSUVASTATIN CALCIUM 5 MG/1
5 TABLET, COATED ORAL DAILY
Qty: 30 TABLET | Refills: 5 | Status: SHIPPED | OUTPATIENT
Start: 2024-03-20

## 2024-03-20 NOTE — PATIENT INSTRUCTIONS
Continue Xarelto 10 mg daily , refilled medication     Use compression stockings 20-30 mm hg day time and elevate legs when able     Start low dose crestor 5 mg daily and go for fasting cholesterol test in 3 months, order placed , phone visit after labs in 3 months     Follow up in one year

## 2024-03-20 NOTE — PROGRESS NOTES
"Patient is here to discuss follow up    /77 (BP Location: Right arm, Patient Position: Chair, Cuff Size: Adult Regular)   Pulse 66   Ht 5' 4\" (1.626 m)   Wt 189 lb 6.4 oz (85.9 kg)   LMP  (LMP Unknown)   SpO2 99%   BMI 32.51 kg/m      Questions patient would like addressed today are: N/A.    Refills are needed: No    Has homecare services and agency name:  [facility]    "

## 2024-03-20 NOTE — PROGRESS NOTES
SUBJECTIVE:  CC:   Follow-up visit, multiple concerns  Taking Xarelto 10 daily  LDL high , intolerance to simva in the past   HPI:  Jessica Qiu is a 73 year old female with history of extensive deep venous thrombosis of iliac, femoral vein of left lower extremity she underwent successful mechanical thrombectomy in May 2020 then followed by initiated full dose Xarelto,  after 6 months decreased the dose to 10 mg daily and multiple occasions D-dimer was good range, tolerating medications without any problems   She developed breast cancer underwent lumpectomy  She has a history of aortic root dilatation, aortic valve insufficiency currently following up with cardiologist and recent echocardiogram  and CT stable unchanged  HISTORIES:  PROBLEM LIST:   Patient Active Problem List   Diagnosis    Gait disorder    Cognitive decline    Nephrolithiasis    Hyperlipidemia with target LDL less than 130    Other idiopathic scoliosis, thoracolumbar region- left sided     Hydrocephalus (H)    Handicap Parking through 7/2018   shunt placed 3/24/2016 @ [place] for hydrocephalus; using walker , risk for falls    Aortic valve insufficiency, unspecified etiology    Depression with anxiety    Foot drop, bilateral    Short Achilles tendon, unspecified laterality    DVT (deep vein thrombosis) in pregnancy     PAST MEDICAL HISTORY:  Past Medical History:   Diagnosis Date    Cognitive decline 12/4/2014    Gait disorder 12/4/2014    Nephrolithiasis 12/4/2014     PAST SURGICAL HISTORY:  Past Surgical History:   Procedure Laterality Date    BREAST SURGERY      breast biopsy    GENITOURINARY SURGERY      kidney stones    HC REMOVAL OF TONSILS,<11 Y/O      Description: Tonsillectomy;  Recorded: 03/19/2008;    IR LOWER EXTREMITY VENOGRAM LEFT  5/28/2020    LITHOTRIPSY  2010    uric acid stone    TONSILLECTOMY      childhood     CURRENT MEDICATIONS:  Current Outpatient Medications   Medication Sig Dispense Refill    acetaminophen (TYLENOL) 500  MG tablet Take 500-1,000 mg by mouth every 6 hours as needed for mild pain      allopurinol (ZYLOPRIM) 300 MG tablet Take 1 tablet (300 mg) by mouth daily 90 tablet 3    calcium carbonate (OS-NICKOLAS) 500 MG tablet Take 1 tablet by mouth 2 times daily      cholecalciferol (VITAMIN D3) 25 mcg (1000 units) capsule Take 1,000 Units by mouth      citalopram (CELEXA) 20 MG tablet TAKE 1 TABLET BY MOUTH ONE TIME DAILY (Patient taking differently: Take 20 mg by mouth every morning) 30 tablet 0    diphenhydrAMINE (BENADRYL) 25 MG tablet Take 25 mg by mouth 2 times daily as needed for allergies       furosemide (LASIX) 20 MG tablet Take 20 mg by mouth daily      letrozole (FEMARA) 2.5 MG tablet Take 1 tablet (2.5 mg) by mouth daily      potassium chloride ER (KLOR-CON M) 20 MEQ CR tablet Take 1 tablet by mouth daily      rivaroxaban ANTICOAGULANT (XARELTO ANTICOAGULANT) 10 MG TABS tablet Take 1 tablet (10 mg) by mouth daily (with dinner) 30 tablet 0     ALLERGIES:  Allergies   Allergen Reactions    Statins Muscle Pain (Myalgia)     Muscle pain and stiffness    Pollen Extract      Pt. Has hayfever     SOCIAL HISTORY:  Social History     Socioeconomic History    Marital status:      Spouse name: Not on file    Number of children: Not on file    Years of education: Not on file    Highest education level: Not on file   Occupational History    Not on file   Tobacco Use    Smoking status: Never    Smokeless tobacco: Never   Substance and Sexual Activity    Alcohol use: Yes     Comment: socially    Drug use: No    Sexual activity: Yes     Partners: Male   Other Topics Concern    Parent/sibling w/ CABG, MI or angioplasty before 65F 55M? No   Social History Narrative    Not on file     Social Determinants of Health     Financial Resource Strain: Not on file   Food Insecurity: Not on file   Transportation Needs: Not on file   Physical Activity: Not on file   Stress: Not on file   Social Connections: Not on file   Interpersonal  "Safety: Not on file   Housing Stability: Not on file     FAMILY HISTORY:  Family History   Problem Relation Age of Onset    Cerebrovascular Disease Mother 38        brain aneurysm    Cancer - colorectal Father 82     REVIEW OF SYSTEMS:  CONSTITUTIONAL:no malaise, fatigue, or other general symptoms  EYES: no subjective changes in visual acuity, no photophobia  ENT/MOUTH: no complaints of rhinorrhea, nasal congestion, sore throat, hearing changes  RESP:no SOB  CV: no c/o exertional chest pressure or DONALDSON  GI: No abdominal pain, constipation, change in bowel movements, nausea, pyrosis, BRBPR  Large right groin lump she underwent lymph node removal by Dr. Pastora Case in October 2021 now it is recurred and much larger size  :no polyuria or polydipsia, no dysuria, no gross hematuria  MUSCULOSKELATAL:no arthalgias or myalgias  INTEGUMENTARY/SKIN: no pruritis, rashes, or moles with recent change in size, shape, or pigmentation  NEURO: no gross sensory or motor symptoms, no dizziness, no confusion  ENDOCRINE: no polyuria or polydipsia, no heat or cold intolerance  HEME/ALLERGY/IMMUNE: no fevers, chills, night sweats, or unwanted weight loss  PSYCHIATRIC: no depression, anxiety, or internal stimuli  EXAM:  /77 (BP Location: Right arm, Patient Position: Chair, Cuff Size: Adult Regular)   Pulse 66   Ht 5' 4\" (1.626 m)   Wt 189 lb 6.4 oz (85.9 kg)   LMP  (LMP Unknown)   SpO2 99%   BMI 32.51 kg/m    BMI: Body mass index is 32.51 kg/m .  GENERAL APPEARANCE:  Pleasant  Healthy appearing male , alert, active, no distress cooperative.  EXAM:  EYES: clear conjunctiva, no cataracts, no obvious fundoscopic abnormalities  HENT: oropharynx, nares, and TMs are WNL  NECK: no JVD, thyromegaly or lymphadenopathy, no cervical bruits  RESP: clear to auscultation without rales, wheezes, or rhonchi  CV: RRR, 2-3/6 AR murmur heard in the right second intercostal space  GI: NABS, ND/NT, no masses or organomegally appreciated  MS: no " obvoius clinicallly relevant arthropathy, no evidence vasculitis  SKIN: no nevi clinically suspicious for malignancy are noted  NEURO: CN II-XII intact, no localizing sensory or motor abnoramlities noted, DTRs symmetrical bilaterally  PSYCH: Mental status exam reveals the pt to have normal mood and affect. There is no disorder of thought form or content. There is no response to internal stimuli. There is no suicidal or homicidal ideation.        CT Chest/Abdomen/Pelvis w Contrast  Order: [id number]  Impression    COMPARISON: PET/CT 06/20/2023. Outside CT abdomen and pelvis 02/26/2023.    TECHNIQUE:  Images were obtained through the chest, abdomen and pelvis following the administration of 75 mL IOHEXOL 350 MG/ML IV SOLN IV contrast.    FINDINGS:  LUNGS: Patent trachea and central airways. New 1.6 cm groundglass nodule in the medial right upper lobe (series 5 image 33 Multiple existing solid pulmonary nodules are unchanged, not limited to 3 mm in the right middle lobe (series 5 image 60).  PLEURA: No pleural effusion. No pneumothorax.  MEDIASTINUM / VESSELS: Ectasia of the ascending thoracic aorta to 42 mm. No significant coronary artery calcifications. No pericardial effusion.  ESOPHAGUS: Normal caliber.  LYMPH NODES: No thoracic lymphadenopathy.    LIVER: Normal morphology. No concerning lesion.  SPLEEN: Normal size.  PANCREAS: Unremarkable.  BILIARY: Negative gallbladder. Borderline dilated common bile duct measuring up to 8 mm, unchanged.  BOWEL, PERITONEUM: Normal caliber bowel.  Duodenal diverticulum. Normal caliber appendix. Small volume ascites surrounding the  shunt catheter tubing in the pelvis.  KIDNEYS, URETERS, BLADDER: No hydronephrosis. No urinary calculi. Tiny left renal cyst.  ADRENALS: Unremarkable.  VESSELS, RETROPERITONEUM: Mild atheromatous vascular calcifications.  LYMPH NODES: No lymphadenopathy.  REPRODUCTIVE: Unremarkable.  LOWER NECK, CHEST AND ABDOMINAL WALL: Similar post surgical  morphology with soft tissue thickening in the right breast since 6/20/2023 now containing a biopsy clip, interval pathology without recurrent malignancy at this location.  shunt catheter courses in the anterior chest wall and terminates in the left lower quadrant.  BONES: No concerning lesion. Degenerative changes lumbar spine with convex left lumbar curve.    IMPRESSION:    1. New 1.6 cm groundglass nodule in the medial right upper lobe. This could be infectious/inflammatory but is indeterminate. Consider follow-up chest CT in 3-6 months. Fleischner criteria do not apply in the setting of history of malignancy.  2. No other findings for noemi or extranodal lymphoma in the chest, abdomen or pelvis.  3. Stable fusiform enlargement of the ascending aorta to 42 mm.  Exam End: 02/15/24  9:28 AM    Specimen Collected: 02/15/24  9:09 AM Last Resulted: 02/15/24  9:58 AM   Received From: [facility]  Result Received: 03/20/24  1:39 PM     A/P:  (I82.512) Hx of extensive  deep vein thrombosis (DVT) of , iliac and femoral vein of left lower extremity s/p [facility] thrombectomy 5/8/2020 (H)  (primary encounter diagnosis)  (D68.51) Factor 5 Leiden mutation, heterozygous (H)  (Z86.711) History of pulmonary embolism  Comment: She is doing well no recurrence of DVT in the left lower extremity currently on maintenance dose of Xarelto 10 mg daily continue the same use compression stockings and elevate the legs  (I77.810) Aortic root dilatation (H)  (I77.810) Mild dilation of ascending aorta (H)  (I35.1) Aortic valve insufficiency, etiology of cardiac valve disease unspecified  Stable currently following up with the cardiologist continue the same and she will need periodic echocardiogra  (E78.5) Dyslipidemia, goal LDL below 70  Initiate low dose crestor 5 mg and get FLP in 3 months  then virtual visit    40 minutes spent on the date of the encounter doing chart review, history, exam and documentation etc. Reviewed recent ER  evaluation outside evaluation, review of ultrasound and CT results with the family and that a lot of questions all of them were answered,     The longitudinal care of plan for the above diagnoses was addressed during this visit. Due to added complexity of care, we will continue to supprt Jessica Qiu and the subsequent management of this/these conditions and with ongoing continuity of care for this/these conditions.      Fco Schreiber MD, [place], [facility], [place]  Vascular medicine

## 2024-05-26 ENCOUNTER — HEALTH MAINTENANCE LETTER (OUTPATIENT)
Age: 74
End: 2024-05-26

## 2024-06-17 ENCOUNTER — LAB (OUTPATIENT)
Dept: LAB | Facility: CLINIC | Age: 74
End: 2024-06-17
Payer: COMMERCIAL

## 2024-06-17 DIAGNOSIS — E78.5 DYSLIPIDEMIA, GOAL LDL BELOW 70: ICD-10-CM

## 2024-06-17 PROCEDURE — 36415 COLL VENOUS BLD VENIPUNCTURE: CPT

## 2024-06-17 PROCEDURE — 80061 LIPID PANEL: CPT

## 2024-06-18 LAB
CHOLEST SERPL-MCNC: 150 MG/DL
FASTING STATUS PATIENT QL REPORTED: YES
HDLC SERPL-MCNC: 44 MG/DL
LDLC SERPL CALC-MCNC: 82 MG/DL
NONHDLC SERPL-MCNC: 106 MG/DL
TRIGL SERPL-MCNC: 118 MG/DL

## 2024-11-06 ENCOUNTER — TELEPHONE (OUTPATIENT)
Dept: CARDIOLOGY | Facility: CLINIC | Age: 74
End: 2024-11-06
Payer: COMMERCIAL

## 2024-11-06 NOTE — TELEPHONE ENCOUNTER
[facility] Call Center    Phone Message    May a detailed message be left on voicemail: yes     Reason for Call: Other: Patient  would like to know what the objective is for our Echocardiogram. Patient is also having a Echocardiogram with [facility] on 11/27/24. Jovany would like a call back to discuss. Thank you       Action Taken: Other: cardiology     Travel Screening: Not Applicable    Thank you!  [facility]       Date of Service:

## 2024-11-06 NOTE — TELEPHONE ENCOUNTER
Spoke with patient's . Patient's  states that the patient is having an echocardiogram with [facility] on 11/27, which was ordered by oncology. Patient's  is worried that that echocardiogram won't give the same information as the one ordered through our clinic. Reviewed with patient's  that info from the [facility] echocardiogram should include everything we are doing the patient's echo for on 12/11. Patient's  requests that we look at the results on 11/27 to make sure it comments on her aorta and aortic valve, and then cancel the 12/11 at that point if everything is ok with the [facility] results. Will set reminder to review [facility] echo results in Care Everywhere around 11/28.

## 2025-05-22 ENCOUNTER — APPOINTMENT (OUTPATIENT)
Dept: CT IMAGING | Facility: CLINIC | Age: 75
End: 2025-05-22
Attending: EMERGENCY MEDICINE
Payer: COMMERCIAL

## 2025-05-22 ENCOUNTER — HOSPITAL ENCOUNTER (INPATIENT)
Facility: CLINIC | Age: 75
End: 2025-05-22
Attending: EMERGENCY MEDICINE
Payer: COMMERCIAL

## 2025-05-22 ENCOUNTER — APPOINTMENT (OUTPATIENT)
Dept: GENERAL RADIOLOGY | Facility: CLINIC | Age: 75
End: 2025-05-22
Attending: EMERGENCY MEDICINE
Payer: COMMERCIAL

## 2025-05-22 VITALS
BODY MASS INDEX: 30.73 KG/M2 | DIASTOLIC BLOOD PRESSURE: 66 MMHG | SYSTOLIC BLOOD PRESSURE: 119 MMHG | OXYGEN SATURATION: 92 % | HEIGHT: 64 IN | TEMPERATURE: 101.2 F | WEIGHT: 180 LBS | RESPIRATION RATE: 19 BRPM | HEART RATE: 90 BPM

## 2025-05-22 DIAGNOSIS — R19.7 DIARRHEA, UNSPECIFIED TYPE: Primary | ICD-10-CM

## 2025-05-22 DIAGNOSIS — G93.40 ACUTE ENCEPHALOPATHY: ICD-10-CM

## 2025-05-22 DIAGNOSIS — J18.9 COMMUNITY ACQUIRED PNEUMONIA OF RIGHT UPPER LOBE OF LUNG: ICD-10-CM

## 2025-05-22 DIAGNOSIS — R50.9 ACUTE FEBRILE ILLNESS: ICD-10-CM

## 2025-05-22 LAB
ALBUMIN SERPL BCG-MCNC: 3.7 G/DL (ref 3.5–5.2)
ALBUMIN UR-MCNC: NEGATIVE MG/DL
ALP SERPL-CCNC: 83 U/L (ref 40–150)
ALT SERPL W P-5'-P-CCNC: 11 U/L (ref 0–50)
ANION GAP SERPL CALCULATED.3IONS-SCNC: 14 MMOL/L (ref 7–15)
APPEARANCE UR: CLEAR
AST SERPL W P-5'-P-CCNC: 18 U/L (ref 0–45)
BASE EXCESS BLDV CALC-SCNC: 1 MMOL/L (ref -3–3)
BASOPHILS # BLD AUTO: 0 10E3/UL (ref 0–0.2)
BASOPHILS NFR BLD AUTO: 0 %
BILIRUB SERPL-MCNC: 1 MG/DL
BILIRUB UR QL STRIP: NEGATIVE
BUN SERPL-MCNC: 11.4 MG/DL (ref 8–23)
CALCIUM SERPL-MCNC: 8.8 MG/DL (ref 8.8–10.4)
CHLORIDE SERPL-SCNC: 95 MMOL/L (ref 98–107)
COLOR UR AUTO: ABNORMAL
CREAT SERPL-MCNC: 0.98 MG/DL (ref 0.51–0.95)
EGFRCR SERPLBLD CKD-EPI 2021: 60 ML/MIN/1.73M2
EOSINOPHIL # BLD AUTO: 0 10E3/UL (ref 0–0.7)
EOSINOPHIL NFR BLD AUTO: 0 %
ERYTHROCYTE [DISTWIDTH] IN BLOOD BY AUTOMATED COUNT: 14.6 % (ref 10–15)
FLUAV RNA SPEC QL NAA+PROBE: NEGATIVE
FLUBV RNA RESP QL NAA+PROBE: NEGATIVE
GLUCOSE SERPL-MCNC: 103 MG/DL (ref 70–99)
GLUCOSE UR STRIP-MCNC: NEGATIVE MG/DL
HCO3 BLDV-SCNC: 25 MMOL/L (ref 21–28)
HCO3 SERPL-SCNC: 22 MMOL/L (ref 22–29)
HCT VFR BLD AUTO: 34.9 % (ref 35–47)
HGB BLD-MCNC: 11.4 G/DL (ref 11.7–15.7)
HGB UR QL STRIP: ABNORMAL
IMM GRANULOCYTES # BLD: 0.1 10E3/UL
IMM GRANULOCYTES NFR BLD: 1 %
KETONES UR STRIP-MCNC: NEGATIVE MG/DL
LACTATE BLD-SCNC: 0.8 MMOL/L (ref 0.7–2)
LEUKOCYTE ESTERASE UR QL STRIP: NEGATIVE
LYMPHOCYTES # BLD AUTO: 0.2 10E3/UL (ref 0.8–5.3)
LYMPHOCYTES NFR BLD AUTO: 2 %
MCH RBC QN AUTO: 32.6 PG (ref 26.5–33)
MCHC RBC AUTO-ENTMCNC: 32.7 G/DL (ref 31.5–36.5)
MCV RBC AUTO: 100 FL (ref 78–100)
MONOCYTES # BLD AUTO: 1.4 10E3/UL (ref 0–1.3)
MONOCYTES NFR BLD AUTO: 13 %
MUCOUS THREADS #/AREA URNS LPF: PRESENT /LPF
NEUTROPHILS # BLD AUTO: 8.7 10E3/UL (ref 1.6–8.3)
NEUTROPHILS NFR BLD AUTO: 84 %
NITRATE UR QL: NEGATIVE
NRBC # BLD AUTO: 0 10E3/UL
NRBC BLD AUTO-RTO: 0 /100
PCO2 BLDV: 38 MM HG (ref 40–50)
PH BLDV: 7.43 [PH] (ref 7.32–7.43)
PH UR STRIP: 5 [PH] (ref 5–7)
PLATELET # BLD AUTO: 166 10E3/UL (ref 150–450)
PO2 BLDV: 20 MM HG (ref 25–47)
POTASSIUM SERPL-SCNC: 3.8 MMOL/L (ref 3.4–5.3)
PROT SERPL-MCNC: 6.1 G/DL (ref 6.4–8.3)
RBC # BLD AUTO: 3.5 10E6/UL (ref 3.8–5.2)
RBC URINE: 2 /HPF
RSV RNA SPEC NAA+PROBE: NEGATIVE
SAO2 % BLDV: 34 % (ref 70–75)
SARS-COV-2 RNA RESP QL NAA+PROBE: NEGATIVE
SODIUM SERPL-SCNC: 131 MMOL/L (ref 135–145)
SP GR UR STRIP: 1.02 (ref 1–1.03)
UROBILINOGEN UR STRIP-MCNC: NORMAL MG/DL
WBC # BLD AUTO: 10.4 10E3/UL (ref 4–11)
WBC URINE: 1 /HPF

## 2025-05-22 PROCEDURE — 250N000011 HC RX IP 250 OP 636: Performed by: EMERGENCY MEDICINE

## 2025-05-22 PROCEDURE — 36415 COLL VENOUS BLD VENIPUNCTURE: CPT | Performed by: EMERGENCY MEDICINE

## 2025-05-22 PROCEDURE — 96361 HYDRATE IV INFUSION ADD-ON: CPT

## 2025-05-22 PROCEDURE — 87637 SARSCOV2&INF A&B&RSV AMP PRB: CPT | Performed by: EMERGENCY MEDICINE

## 2025-05-22 PROCEDURE — 82247 BILIRUBIN TOTAL: CPT | Performed by: EMERGENCY MEDICINE

## 2025-05-22 PROCEDURE — 82310 ASSAY OF CALCIUM: CPT | Performed by: EMERGENCY MEDICINE

## 2025-05-22 PROCEDURE — 85025 COMPLETE CBC W/AUTO DIFF WBC: CPT | Performed by: EMERGENCY MEDICINE

## 2025-05-22 PROCEDURE — 250N000013 HC RX MED GY IP 250 OP 250 PS 637

## 2025-05-22 PROCEDURE — 250N000013 HC RX MED GY IP 250 OP 250 PS 637: Performed by: EMERGENCY MEDICINE

## 2025-05-22 PROCEDURE — 82803 BLOOD GASES ANY COMBINATION: CPT

## 2025-05-22 PROCEDURE — 96374 THER/PROPH/DIAG INJ IV PUSH: CPT

## 2025-05-22 PROCEDURE — 99285 EMERGENCY DEPT VISIT HI MDM: CPT | Mod: 25

## 2025-05-22 PROCEDURE — 87040 BLOOD CULTURE FOR BACTERIA: CPT | Performed by: EMERGENCY MEDICINE

## 2025-05-22 PROCEDURE — 120N000001 HC R&B MED SURG/OB

## 2025-05-22 PROCEDURE — 258N000003 HC RX IP 258 OP 636

## 2025-05-22 PROCEDURE — 258N000003 HC RX IP 258 OP 636: Performed by: EMERGENCY MEDICINE

## 2025-05-22 PROCEDURE — 81001 URINALYSIS AUTO W/SCOPE: CPT | Performed by: EMERGENCY MEDICINE

## 2025-05-22 PROCEDURE — 87640 STAPH A DNA AMP PROBE: CPT

## 2025-05-22 PROCEDURE — 81003 URINALYSIS AUTO W/O SCOPE: CPT | Performed by: EMERGENCY MEDICINE

## 2025-05-22 PROCEDURE — 99223 1ST HOSP IP/OBS HIGH 75: CPT

## 2025-05-22 PROCEDURE — 71046 X-RAY EXAM CHEST 2 VIEWS: CPT

## 2025-05-22 PROCEDURE — 70450 CT HEAD/BRAIN W/O DYE: CPT

## 2025-05-22 RX ORDER — OLANZAPINE 5 MG/1
5 TABLET, FILM COATED ORAL SEE ADMIN INSTRUCTIONS
COMMUNITY

## 2025-05-22 RX ORDER — AMOXICILLIN 250 MG
1 CAPSULE ORAL 2 TIMES DAILY PRN
Status: DISCONTINUED | OUTPATIENT
Start: 2025-05-22 | End: 2025-05-27 | Stop reason: HOSPADM

## 2025-05-22 RX ORDER — CLINDAMYCIN PHOSPHATE 10 MG/G
GEL TOPICAL 2 TIMES DAILY
COMMUNITY

## 2025-05-22 RX ORDER — CEFTRIAXONE 2 G/1
2 INJECTION, POWDER, FOR SOLUTION INTRAMUSCULAR; INTRAVENOUS EVERY 24 HOURS
Status: DISCONTINUED | OUTPATIENT
Start: 2025-05-23 | End: 2025-05-27 | Stop reason: HOSPADM

## 2025-05-22 RX ORDER — FUROSEMIDE 20 MG/1
20 TABLET ORAL DAILY
Status: DISCONTINUED | OUTPATIENT
Start: 2025-05-23 | End: 2025-05-26

## 2025-05-22 RX ORDER — ACETAMINOPHEN 325 MG/1
975 TABLET ORAL EVERY 6 HOURS PRN
Status: DISCONTINUED | OUTPATIENT
Start: 2025-05-22 | End: 2025-05-27 | Stop reason: HOSPADM

## 2025-05-22 RX ORDER — IPRATROPIUM BROMIDE AND ALBUTEROL SULFATE 2.5; .5 MG/3ML; MG/3ML
3 SOLUTION RESPIRATORY (INHALATION) EVERY 4 HOURS PRN
Status: DISCONTINUED | OUTPATIENT
Start: 2025-05-22 | End: 2025-05-27 | Stop reason: HOSPADM

## 2025-05-22 RX ORDER — ALLOPURINOL 300 MG/1
300 TABLET ORAL DAILY
Status: DISCONTINUED | OUTPATIENT
Start: 2025-05-23 | End: 2025-05-27 | Stop reason: HOSPADM

## 2025-05-22 RX ORDER — ONDANSETRON 4 MG/1
4 TABLET, ORALLY DISINTEGRATING ORAL EVERY 6 HOURS PRN
Status: DISCONTINUED | OUTPATIENT
Start: 2025-05-22 | End: 2025-05-27 | Stop reason: HOSPADM

## 2025-05-22 RX ORDER — ACETAMINOPHEN 650 MG/1
650 SUPPOSITORY RECTAL EVERY 4 HOURS PRN
Status: DISCONTINUED | OUTPATIENT
Start: 2025-05-22 | End: 2025-05-27 | Stop reason: HOSPADM

## 2025-05-22 RX ORDER — ROSUVASTATIN CALCIUM 5 MG/1
5 TABLET, COATED ORAL DAILY
Status: DISCONTINUED | OUTPATIENT
Start: 2025-05-23 | End: 2025-05-27 | Stop reason: HOSPADM

## 2025-05-22 RX ORDER — ONDANSETRON 2 MG/ML
4 INJECTION INTRAMUSCULAR; INTRAVENOUS EVERY 6 HOURS PRN
Status: DISCONTINUED | OUTPATIENT
Start: 2025-05-22 | End: 2025-05-27 | Stop reason: HOSPADM

## 2025-05-22 RX ORDER — LIDOCAINE 40 MG/G
CREAM TOPICAL
Status: DISCONTINUED | OUTPATIENT
Start: 2025-05-22 | End: 2025-05-27 | Stop reason: HOSPADM

## 2025-05-22 RX ORDER — EXEMESTANE 25 MG/1
25 TABLET ORAL DAILY
Status: DISCONTINUED | OUTPATIENT
Start: 2025-05-23 | End: 2025-05-27 | Stop reason: HOSPADM

## 2025-05-22 RX ORDER — FUROSEMIDE 20 MG/1
20 TABLET ORAL DAILY
COMMUNITY

## 2025-05-22 RX ORDER — AZITHROMYCIN MONOHYDRATE 500 MG/5ML
500 INJECTION, POWDER, LYOPHILIZED, FOR SOLUTION INTRAVENOUS ONCE
Status: COMPLETED | OUTPATIENT
Start: 2025-05-22 | End: 2025-05-22

## 2025-05-22 RX ORDER — POTASSIUM CHLORIDE 1500 MG/1
20 TABLET, EXTENDED RELEASE ORAL 3 TIMES DAILY
COMMUNITY

## 2025-05-22 RX ORDER — EXEMESTANE 25 MG/1
25 TABLET ORAL DAILY
COMMUNITY

## 2025-05-22 RX ORDER — CALCIUM CARBONATE 500 MG/1
1000 TABLET, CHEWABLE ORAL 4 TIMES DAILY PRN
Status: DISCONTINUED | OUTPATIENT
Start: 2025-05-22 | End: 2025-05-27 | Stop reason: HOSPADM

## 2025-05-22 RX ORDER — CITALOPRAM HYDROBROMIDE 20 MG/1
20 TABLET ORAL DAILY
Status: DISCONTINUED | OUTPATIENT
Start: 2025-05-23 | End: 2025-05-27 | Stop reason: HOSPADM

## 2025-05-22 RX ORDER — ACETAMINOPHEN 500 MG
500 TABLET ORAL ONCE
Status: COMPLETED | OUTPATIENT
Start: 2025-05-22 | End: 2025-05-22

## 2025-05-22 RX ORDER — CEFTRIAXONE 2 G/1
2 INJECTION, POWDER, FOR SOLUTION INTRAMUSCULAR; INTRAVENOUS ONCE
Status: COMPLETED | OUTPATIENT
Start: 2025-05-22 | End: 2025-05-22

## 2025-05-22 RX ORDER — POTASSIUM CHLORIDE 1500 MG/1
20 TABLET, EXTENDED RELEASE ORAL 3 TIMES DAILY
Status: DISCONTINUED | OUTPATIENT
Start: 2025-05-22 | End: 2025-05-27 | Stop reason: HOSPADM

## 2025-05-22 RX ORDER — AMOXICILLIN 250 MG
2 CAPSULE ORAL 2 TIMES DAILY PRN
Status: DISCONTINUED | OUTPATIENT
Start: 2025-05-22 | End: 2025-05-27 | Stop reason: HOSPADM

## 2025-05-22 RX ORDER — ONDANSETRON 2 MG/ML
4 INJECTION INTRAMUSCULAR; INTRAVENOUS ONCE
Status: COMPLETED | OUTPATIENT
Start: 2025-05-22 | End: 2025-05-22

## 2025-05-22 RX ADMIN — AZITHROMYCIN MONOHYDRATE 500 MG: 500 INJECTION, POWDER, LYOPHILIZED, FOR SOLUTION INTRAVENOUS at 18:48

## 2025-05-22 RX ADMIN — ONDANSETRON 4 MG: 2 INJECTION, SOLUTION INTRAMUSCULAR; INTRAVENOUS at 16:43

## 2025-05-22 RX ADMIN — ACETAMINOPHEN 500 MG: 500 TABLET ORAL at 16:42

## 2025-05-22 RX ADMIN — CEFTRIAXONE SODIUM 2 G: 2 INJECTION, POWDER, FOR SOLUTION INTRAMUSCULAR; INTRAVENOUS at 18:39

## 2025-05-22 RX ADMIN — ACETAMINOPHEN 975 MG: 325 TABLET, FILM COATED ORAL at 22:22

## 2025-05-22 RX ADMIN — RIVAROXABAN 10 MG: 10 TABLET, FILM COATED ORAL at 22:59

## 2025-05-22 RX ADMIN — SODIUM CHLORIDE 1000 ML: 0.9 INJECTION, SOLUTION INTRAVENOUS at 18:37

## 2025-05-22 RX ADMIN — SODIUM CHLORIDE 500 ML: 9 INJECTION, SOLUTION INTRAVENOUS at 16:41

## 2025-05-22 ASSESSMENT — ACTIVITIES OF DAILY LIVING (ADL)
DRESSING/BATHING_DIFFICULTY: NO
ADLS_ACUITY_SCORE: 38
ADLS_ACUITY_SCORE: 38
NUMBER_OF_TIMES_PATIENT_HAS_FALLEN_WITHIN_LAST_SIX_MONTHS: 2
FALL_HISTORY_WITHIN_LAST_SIX_MONTHS: YES
EATING/SWALLOWING: EATING
CONCENTRATING,_REMEMBERING_OR_MAKING_DECISIONS_DIFFICULTY: YES
DOING_ERRANDS_INDEPENDENTLY_DIFFICULTY: NO
PATIENT'S_PREFERRED_MEANS_OF_COMMUNICATION: VERBAL
ADLS_ACUITY_SCORE: 48
CHANGE_IN_FUNCTIONAL_STATUS_SINCE_ONSET_OF_CURRENT_ILLNESS/INJURY: YES
WALKING_OR_CLIMBING_STAIRS_DIFFICULTY: YES
ADLS_ACUITY_SCORE: 48
DIFFICULTY_COMMUNICATING: NO
ADLS_ACUITY_SCORE: 48
DESCRIBE_HEARING_LOSS: BILATERAL HEARING LOSS
WEAR_GLASSES_OR_BLIND: YES
ADLS_ACUITY_SCORE: 48
ADLS_ACUITY_SCORE: 48
THE_FOLLOWING_AIDS_WERE_PROVIDED;: PATIENT DECLINED OFFER OF AUXILIARY AIDS
WERE_AUXILIARY_AIDS_OFFERED?: NO
TOILETING_ISSUES: NO
ADLS_ACUITY_SCORE: 48
EQUIPMENT_CURRENTLY_USED_AT_HOME: WALKER, ROLLING;SHOWER CHAIR;GRAB BAR, TUB/SHOWER;GRAB BAR, TOILET
WALKING_OR_CLIMBING_STAIRS: AMBULATION DIFFICULTY, REQUIRES EQUIPMENT;STAIR CLIMBING DIFFICULTY, REQUIRES EQUIPMENT
DIFFICULTY_EATING/SWALLOWING: YES
HEARING_DIFFICULTY_OR_DEAF: YES
VISION_MANAGEMENT: GLASSES

## 2025-05-22 ASSESSMENT — COLUMBIA-SUICIDE SEVERITY RATING SCALE - C-SSRS
1. IN THE PAST MONTH, HAVE YOU WISHED YOU WERE DEAD OR WISHED YOU COULD GO TO SLEEP AND NOT WAKE UP?: NO
2. HAVE YOU ACTUALLY HAD ANY THOUGHTS OF KILLING YOURSELF IN THE PAST MONTH?: NO
6. HAVE YOU EVER DONE ANYTHING, STARTED TO DO ANYTHING, OR PREPARED TO DO ANYTHING TO END YOUR LIFE?: NO

## 2025-05-22 NOTE — ED TRIAGE NOTES
Last night 5/21 slide out of bed and did not lose consciousness or hit head per . Just finished radiation treatment. Last known coherent conversation 0140 5/22. Started to become really confused around 1300 5/22 Came via EMS.     Triage Assessment (Adult)       Row Name 05/22/25 1549          Triage Assessment    Airway WDL WDL        Respiratory WDL    Respiratory WDL WDL        Skin Circulation/Temperature WDL    Skin Circulation/Temperature WDL WDL        Cardiac WDL    Cardiac WDL X        Peripheral/Neurovascular WDL    Peripheral Neurovascular WDL WDL        Cognitive/Neuro/Behavioral WDL    Cognitive/Neuro/Behavioral WDL X     Level of Consciousness confused     Orientation disoriented x 4     Speech clear     Mood/Behavior calm        Pupils (CN II)    Pupil PERRLA yes        [place] Coma Scale    Best Eye Response 4-->(E4) spontaneous     Best Motor Response 5-->(M5) localizes pain     Best Verbal Response 5-->(V5) oriented     [place] Coma Scale Score 14

## 2025-05-22 NOTE — ED PROVIDER NOTES
Emergency Department Note      History of Present Illness     Chief Complaint   Altered Mental Status    History is limited due to the condition of the patient.    HPI   Jessica Qiu is a 74 year old female, anticoagulated on Xarelto, with a history of DVT, PE, Factor V Leiden, s/p  shunt, and follicular lymphoma amongst others presenting to the ED via EMS with altered mental status and fever at 102.4F. She is accompanied in the ED by her  who states that the patient has suffered ongoing confusion throughout the day today, up to the point where around 1300 she became for the most part incoherent. He states that the patient has experienced a cough for a couple days. She denies any abdominal pain, diarrhea, constipation, urinary symptoms. In the ED she struggles to provide history other than denying the aforementioned symptoms. She has not taken any Tylenol thus far today.    Independent Historian    as detailed above.    Review of External Notes   Reviewed oncology note from 5/14/24.     Past Medical History     Medical History and Problem List   Cognitive decline  Gait disorder  Nephrolithiasis   Hyperlipidemia  Idiopathic scoliosis, thoracolumbar region.  Hydrocephalus  Aortic valve insufficiency  Depression with anxiety   Foot drop, bilateral  Short achilles tendon  DVT  Ectasia thoracic aorta  Cataract, bilateral  Factor V Leiden   Follicular lymphoma   Pulmonary embolism   Breast cancer, right  Incontinence urinary stress and urge  Osteoarthritis   Osteopenia  S/p  shunt    Medications   Tylenol  Zyloprim  Os-all  Vitamin D3  Celexa  Benadryl  Lasix  Femara  Xarelto  Crestor     Surgical History   Past Surgical History:   Procedure Laterality Date    BREAST SURGERY      breast biopsy    GENITOURINARY SURGERY      kidney stones    HC REMOVAL OF TONSILS,<11 Y/O      Description: Tonsillectomy;  Recorded: 03/19/2008;    IR CHEST PORT PLACEMENT > 5 YRS OF AGE  3/30/2023    IR CHEST PORT  "PLACEMENT > 5 YRS OF AGE  11/22/2024    IR LOWER EXTREMITY VENOGRAM LEFT  5/28/2020    IR LUMBAR PUNCTURE  9/9/2013    LITHOTRIPSY  2010    uric acid stone    TONSILLECTOMY      childhood       Physical Exam     Patient Vitals for the past 24 hrs:   BP Temp Temp src Pulse Resp SpO2 Height Weight   05/22/25 1858 120/64 99.3  F (37.4  C) -- 100 20 94 % -- --   05/22/25 1630 132/78 -- -- 105 24 94 % -- --   05/22/25 1600 -- -- -- 102 30 97 % -- --   05/22/25 1549 131/76 -- -- 106 (!) 32 95 % -- --   05/22/25 1546 131/76 (!) 102.4  F (39.1  C) Oral 106 16 -- 1.626 m (5' 4\") 81.6 kg (180 lb)     Physical Exam  Gen: well appearing, in no acute distress  Oral : Mucous membranes moist,   Nose: No rhinorhea  Ears: External near normal, without drainage  Eyes: periorbital tissues and sclera normal   Neck: supple, no abnormal swelling  Lungs: Clear bilaterally, no tachypnea or distress, speaks full sentences  CV: Tachycardic  Abd: soft, nontender, nondistended, no rebound/guarding  Ext: no lower extremity edema  Skin: warm, dry, well perfused, no rashes/bruising/lesions on exposed skin  Neuro: alert, no gross motor or sensory deficits,   Psych: pleasant mood, normal affect    Diagnostics     Lab Results   Labs Ordered and Resulted from Time of ED Arrival to Time of ED Departure   COMPREHENSIVE METABOLIC PANEL - Abnormal       Result Value    Sodium 131 (*)     Potassium 3.8      Carbon Dioxide (CO2) 22      Anion Gap 14      Urea Nitrogen 11.4      Creatinine 0.98 (*)     GFR Estimate 60 (*)     Calcium 8.8      Chloride 95 (*)     Glucose 103 (*)     Alkaline Phosphatase 83      AST 18      ALT 11      Protein Total 6.1 (*)     Albumin 3.7      Bilirubin Total 1.0     ROUTINE UA WITH MICROSCOPIC REFLEX TO CULTURE - Abnormal    Color Urine Orange (*)     Appearance Urine Clear      Glucose Urine Negative      Bilirubin Urine Negative      Ketones Urine Negative      Specific Gravity Urine 1.017      Blood Urine Moderate (*)  "    pH Urine 5.0      Protein Albumin Urine Negative      Urobilinogen Urine Normal      Nitrite Urine Negative      Leukocyte Esterase Urine Negative      Mucus Urine Present (*)     RBC Urine 2      WBC Urine 1     CBC WITH PLATELETS AND DIFFERENTIAL - Abnormal    WBC Count 10.4      RBC Count 3.50 (*)     Hemoglobin 11.4 (*)     Hematocrit 34.9 (*)           MCH 32.6      MCHC 32.7      RDW 14.6      Platelet Count 166      % Neutrophils 84      % Lymphocytes 2      % Monocytes 13      % Eosinophils 0      % Basophils 0      % Immature Granulocytes 1      NRBCs per 100 WBC 0      Absolute Neutrophils 8.7 (*)     Absolute Lymphocytes 0.2 (*)     Absolute Monocytes 1.4 (*)     Absolute Eosinophils 0.0      Absolute Basophils 0.0      Absolute Immature Granulocytes 0.1      Absolute NRBCs 0.0     ISTAT GASES LACTATE VENOUS POCT - Abnormal    Lactic Acid POCT 0.8      Bicarbonate Venous POCT 25      O2 Sat, Venous POCT 34 (*)     pCO2 Venous POCT 38 (*)     pH Venous POCT 7.43      pO2 Venous POCT 20 (*)     Base Excess/Deficit (+/-) POCT 1.0     INFLUENZA A/B, RSV AND SARS-COV2 PCR - Normal    Influenza A PCR Negative      Influenza B PCR Negative      RSV PCR Negative      SARS CoV2 PCR Negative     BLOOD CULTURE   BLOOD CULTURE   RESPIRATORY AEROBIC BACTERIAL CULTURE       Imaging   Head CT w/o contrast   Final Result   IMPRESSION:   1.  No acute intracranial process.   2.  Right frontal approach ventricular shunt catheter. No hydrocephalus. No comparison exams to assess for ventricular size and morphology stability.   3.  Chronic changes as above.         XR Chest 2 Views   Final Result   IMPRESSION: A large focal opacity seen in the right upper lobe and in the right infrahilar region, compatible with pneumonia. Left lung appears clear. No pleural effusion or pneumothorax. Heart size and pulmonary vascularity are within normal limits.    Left chest wall port catheter terminates in the right atrium. A   shunt again seen traversing the right chest wall.        Independent Interpretation   None    ED Course      Medications Administered   Medications   sodium chloride (PF) 0.9% PF flush 3 mL (has no administration in time range)   sodium chloride (PF) 0.9% PF flush 3 mL ( Intracatheter Canceled Entry 5/22/25 1636)   azithromycin (ZITHROMAX) 500 mg vial to attach to  mL bag (500 mg Intravenous $New Bag 5/22/25 1848)   sodium chloride 0.9% BOLUS 1,000 mL (1,000 mLs Intravenous $New Bag 5/22/25 1837)   acetaminophen (TYLENOL) tablet 500 mg (500 mg Oral $Given 5/22/25 1642)   sodium chloride 0.9% BOLUS 500 mL (500 mLs Intravenous $New Bag 5/22/25 1641)   ondansetron (ZOFRAN) injection 4 mg (4 mg Intravenous $Given 5/22/25 1643)   cefTRIAXone (ROCEPHIN) 2 g vial to attach to  ml bag for ADULTS or NS 50 ml bag for PEDS (2 g Intravenous $New Bag 5/22/25 1839)       Procedures   Procedures     Discussion of Management   See ED course.     ED Course   ED Course as of 05/22/25 1925   Thu May 22, 2025   1620 I obtained history and examined the patient as noted above.   1812 I consulted with Dr. Dean of the hospitalist service and discussed patient admission. They accepted care of the patient.       Additional Documentation  None    Medical Decision Making / Diagnosis     CMS Diagnoses: IV Antibiotics given and/or elevated Lactate of 0.8 and no sepsis note found - Delete this reminder and enter the sepsis note or '.edcms' before signing chart.>>>None    MIPS   None             [facility]   Jessica Qiu is a 74 year old female presents with fever and a bit of confusion at home increased weakness today.  She is alert and interactive currently.  Does not appear toxic.  Lactate is normal.  Blood cultures obtained given the fever, skin exam normal.  Bittick cough over last couple days no hypoxia or significant increased work of breathing today.  Chest x-ray shows right-sided pneumonia.  Antibiotics ordered.  Given the  fever and confusion, as well as lymphoma diagnosis I think inpatient management is most appropriate.  Contacted hospitalist who is in agreement.    Disposition   The patient was admitted to the hospital.     Diagnosis     ICD-10-CM    1. Community acquired pneumonia of right upper lobe of lung  J18.9       2. Acute febrile illness  R50.9       3. Acute encephalopathy  G93.40            Discharge Medications   New Prescriptions    No medications on file       Scribe Disclosure:  I, MELVIN WISE, am serving as a scribe at 4:25 PM on 5/22/2025 to document services personally performed by Duke Squires MD, based on my observations and the provider's statements to me.        Duke Squires MD  05/22/25 1925

## 2025-05-22 NOTE — H&P
[facility]    History and Physical - Hospitalist Service       Date of Admission:  5/22/2025    Assessment & Plan    Jessica Qiu is a 74 year old female with PMH recurrent follcular lymphoma, hydrocephalus s/p  shunt 2016, DVT on xarelto, HLD, anxiety admitted on 5/22/2025 with sepsis secondary to pneumonia.     Sepsis secondary to community-acquired pneumonia  Infectious encephalopathy- improving  Presents with confusion, fever, tachycardia, new cough, infectious workup with CXR showing right upper lobe pneumonia, UA negative.  Remains hemodynamically stable on room air.  Less likely meningitis without signs/symptoms or headache will monitor closely.  Last dose of maintenance ritixumab 5/14/2025, not on other chemotherapy currently. Started on ceftriaxone/azithromycin in ER.   - Continue ceftriaxone/azithromycin   - MRSA nares- add vancomycin if positive   - Sputum culture/gram stain   - O2 monitoring, wean as able  - Diego PRN      Low-grade follicular lymphoma, stage II, recurrent  Follows with [facility] oncology, last seen 2024 PET revealed relapse after 14 months has undergone palliative cytoxan/doxorubicin/vincristine with rituximab and possible mini-chop per chart review, currently on maintenance rituxan every 4 weeks, last dose 5/14/25.   - Follow up with oncology as planned    Hyponatremia  Mild, 131, appears overall euvolemic, monitor.  -BMP in a.m.    H/o idiopathic/normal pressure hydrocephalus s/p  shunt 2016  Noted, CT head without hydrocephalus to suggest malfunctioning shunt.     Lymphedema   - hold PTA lasix 20mg and KCl for now with sepsis- consider resuming 5/23    Anemia  Hemoglobin 11.4, at recent baseline.  - Monitor    H/o breast cancer  Stage IA (pT1c, pN0) invasive lobular carcinoma of the right breast, ER positive, CO positive, HER2 negative, s/p lumpectomy with SLNB in 2023, currently on exemestane (had diarrhea on letrozole).   - Continue Pta  "exemestane    Gout  - continue PTA allopurinol    Anxiety/depression  - Continue PTA citalopram 20mg daily     H/o DVT  - Continue PTA xarelto             Diet:  Regular  DVT Prophylaxis: DOAC  Quintero Catheter: Not present  Lines: None     Cardiac Monitoring: None  Code Status:  DNR, okay to intubate confirmed with patient and family on admission    Clinically Significant Risk Factors Present on Admission         # Hyponatremia: Lowest Na = 131 mmol/L in last 2 days, will monitor as appropriate  # Hypochloremia: Lowest Cl = 95 mmol/L in last 2 days, will monitor as appropriate       # Drug Induced Coagulation Defect: home medication list includes an anticoagulant medication              # Obesity: Estimated body mass index is 30.9 kg/m  as calculated from the following:    Height as of this encounter: 1.626 m (5' 4\").    Weight as of this encounter: 81.6 kg (180 lb).              Disposition Plan     Medically Ready for Discharge: Anticipated in 2-4 Days           Gonzalo Dean MD  Hospitalist Service  [facility]  Securely message with [employer] (more info)  Text page via [employer] Paging/Directory     ______________________________________________________________________    Chief Complaint   Confusion    History is obtained from the patient and family (, daughter and son)    History of Present Illness   Jessica Qiu is a 74 year old female currently undergoing maintenance rituximab therapy for follicular lymphoma who presents with 1 day of confusion, weakness and fall and fever found to have pneumonia.  Per ER report, she has improved significantly by the time of my interview but presented quite obtunded, confused.  She reports she slid out of bed today and felt globally weak, did not get dizzy, lose consciousness or hit her head, and her  had noted increased confusion throughout the day.  She has been sick with a cold with nasal congestion and cough which has become more " productive of sputum, she does not know what color.  She was noted to have a fever of 103 with EMS.  She has intermittent headaches and currently has a mild headache and reports she generally has neck stiffness that has not changed or worsened, she denies dysuria, abdominal pain, diarrhea.      ED Course  VS: Tmax 102.4, HR 100s => 90s, BP 110s-130s/60s-70s, RR 34 => 19, SpO2 greater than 92% on RA  Workup:   Labs: Hemoglobin 11.4, WBC 10, , CR 0.98, lactate 0.8, flu/COVID/RSV negative, UA without infection  Imaging:   CT head without acute intracranial process, no hydrocephalus  CXR with large focal opacity right upper lobe consistent with pneumonia  Interventions:   Ceftriaxone, azithromycin, 1 L IV fluid        Past Medical History    Past Medical History:   Diagnosis Date    Cognitive decline 12/4/2014    Gait disorder 12/4/2014    Nephrolithiasis 12/4/2014       Past Surgical History   Past Surgical History:   Procedure Laterality Date    BREAST SURGERY      breast biopsy    GENITOURINARY SURGERY      kidney stones    HC REMOVAL OF TONSILS,<13 Y/O      Description: Tonsillectomy;  Recorded: 03/19/2008;    IR CHEST PORT PLACEMENT > 5 YRS OF AGE  3/30/2023    IR CHEST PORT PLACEMENT > 5 YRS OF AGE  11/22/2024    IR LOWER EXTREMITY VENOGRAM LEFT  5/28/2020    IR LUMBAR PUNCTURE  9/9/2013    LITHOTRIPSY  2010    uric acid stone    TONSILLECTOMY      childhood       Prior to Admission Medications   Prior to Admission Medications   Prescriptions Last Dose Informant Patient Reported? Taking?   acetaminophen (TYLENOL) 500 MG tablet   Yes No   Sig: Take 500-1,000 mg by mouth every 6 hours as needed for mild pain   allopurinol (ZYLOPRIM) 300 MG tablet   No No   Sig: Take 1 tablet (300 mg) by mouth daily   calcium carbonate (OS-NICKOLAS) 500 MG tablet   Yes No   Sig: Take 1 tablet by mouth 2 times daily   cholecalciferol (VITAMIN D3) 25 mcg (1000 units) capsule   Yes No   Sig: Take 1,000 Units by mouth   citalopram  (CELEXA) 20 MG tablet   No No   Sig: TAKE 1 TABLET BY MOUTH ONE TIME DAILY   Patient taking differently: Take 20 mg by mouth every morning   diphenhydrAMINE (BENADRYL) 25 MG tablet  Self Yes No   Sig: Take 25 mg by mouth 2 times daily as needed for allergies    furosemide (LASIX) 20 MG tablet   Yes No   Sig: Take 20 mg by mouth daily   letrozole (FEMARA) 2.5 MG tablet   Yes No   Sig: Take 1 tablet (2.5 mg) by mouth daily   rivaroxaban ANTICOAGULANT (XARELTO ANTICOAGULANT) 10 MG TABS tablet   No No   Sig: Take 1 tablet (10 mg) by mouth daily (with dinner)   rosuvastatin (CRESTOR) 5 MG tablet   No No   Sig: Take 1 tablet (5 mg) by mouth daily      Facility-Administered Medications: None        Review of Systems    The 5 point Review of Systems is negative other than noted in the HPI or here.     Social History   I have reviewed this patient's social history and updated it with pertinent information if needed.  Social History     Tobacco Use    Smoking status: Never    Smokeless tobacco: Never   Substance Use Topics    Alcohol use: Yes     Comment: socially    Drug use: No        Physical Exam   Vital Signs: Temp: (!) 102.4  F (39.1  C) Temp src: Oral BP: 131/76 Pulse: 102   Resp: 30 SpO2: 97 % O2 Device: None (Room air)    Weight: 180 lbs 0 oz    Constitutional: awake, alert, cooperative, NAD  HEENT: normocephalic, anicteric sclerae, MMM   Pulmonary: no increased work of breathing on room air, bibasilar crackles more prominent on R, no wheezing   Cardiovascular:  tachycardic, regular rhythm, no murmur, Left chest port without pain/erythema   GI: BS+, soft, non-tender, non-distended, without guarding or rigidity  Skin: warm, dry  MSK: trace pretibial edema bilaterally    Neuro: AAOx person, hospital but not time or circumstance, CN II-XII grossly intact, decreased strength bilateral hip flexion, otherwise no gross focal neurologic deficits noted,        Medical Decision Making       75 MINUTES SPENT BY ME on the date  of service doing chart review, history, exam, documentation & further activities per the note.      Data     I have personally reviewed the following data over the past 24 hrs:    10.4  \   11.4 (L)   / 166     131 (L) 95 (L) 11.4 /  103 (H)   3.8 22 0.98 (H) \     ALT: 11 AST: 18 AP: 83 TBILI: 1.0   ALB: 3.7 TOT PROTEIN: 6.1 (L) LIPASE: N/A     Procal: N/A CRP: N/A Lactic Acid: 0.8         Imaging results reviewed over the past 24 hrs:   Recent Results (from the past 24 hours)   XR Chest 2 Views    Narrative    EXAM: XR CHEST 2 VIEWS  LOCATION: [facility]  DATE: 5/22/2025    INDICATION: Fever  COMPARISON: Chest x-ray 01/07/2009; CT chest abdomen pelvis 4/11/2025      Impression    IMPRESSION: A large focal opacity seen in the right upper lobe and in the right infrahilar region, compatible with pneumonia. Left lung appears clear. No pleural effusion or pneumothorax. Heart size and pulmonary vascularity are within normal limits.   Left chest wall port catheter terminates in the right atrium. A  shunt again seen traversing the right chest wall.   Head CT w/o contrast    Narrative    EXAM: CT HEAD W/O CONTRAST  LOCATION: [facility]  DATE: 5/22/2025    INDICATION: confusion, fever,  shunt  COMPARISON: None.  TECHNIQUE: Routine CT Head without IV contrast. Multiplanar reformats. Dose reduction techniques were used.    FINDINGS:  INTRACRANIAL CONTENTS: Right frontal approach tracheostomy catheter terminates in the anterior third ventricle. The ventricular system does not appear dilated. There are no comparison exams to assess for ventricular size and morphology stability. No   intracranial hemorrhage, extraaxial collection, or mass effect.  Mineralization of the right basal ganglia. No CT evidence of acute infarct. Linear encephalomalacia within the right parietal lobe extending from a previous miguel angel hole likely represents   encephalomalacia from previous catheter  tract. Mild presumed chronic small vessel ischemic changes. Mild generalized volume loss. No hydrocephalus.     VISUALIZED ORBITS/SINUSES/MASTOIDS: Prior bilateral cataract surgery. Visualized portions of the orbits are otherwise unremarkable. Severe mucosal thickening of the left maxillary sinus. Mild mucosal thickening of the right maxillary sinus. No middle ear   or mastoid effusion.    BONES/SOFT TISSUES: No acute abnormality.      Impression    IMPRESSION:  1.  No acute intracranial process.  2.  Right frontal approach ventricular shunt catheter. No hydrocephalus. No comparison exams to assess for ventricular size and morphology stability.  3.  Chronic changes as above.

## 2025-05-23 ENCOUNTER — APPOINTMENT (OUTPATIENT)
Dept: PHYSICAL THERAPY | Facility: CLINIC | Age: 75
End: 2025-05-23
Payer: COMMERCIAL

## 2025-05-23 LAB
ANION GAP SERPL CALCULATED.3IONS-SCNC: 12 MMOL/L (ref 7–15)
BUN SERPL-MCNC: 10.8 MG/DL (ref 8–23)
CALCIUM SERPL-MCNC: 8.5 MG/DL (ref 8.8–10.4)
CHLORIDE SERPL-SCNC: 101 MMOL/L (ref 98–107)
CREAT SERPL-MCNC: 1 MG/DL (ref 0.51–0.95)
EGFRCR SERPLBLD CKD-EPI 2021: 58 ML/MIN/1.73M2
ERYTHROCYTE [DISTWIDTH] IN BLOOD BY AUTOMATED COUNT: 14.8 % (ref 10–15)
GLUCOSE SERPL-MCNC: 84 MG/DL (ref 70–99)
HCO3 SERPL-SCNC: 22 MMOL/L (ref 22–29)
HCT VFR BLD AUTO: 30.8 % (ref 35–47)
HGB BLD-MCNC: 10.2 G/DL (ref 11.7–15.7)
MCH RBC QN AUTO: 32.9 PG (ref 26.5–33)
MCHC RBC AUTO-ENTMCNC: 33.1 G/DL (ref 31.5–36.5)
MCV RBC AUTO: 99 FL (ref 78–100)
MRSA DNA SPEC QL NAA+PROBE: NEGATIVE
PLATELET # BLD AUTO: 143 10E3/UL (ref 150–450)
POTASSIUM SERPL-SCNC: 3.7 MMOL/L (ref 3.4–5.3)
RBC # BLD AUTO: 3.1 10E6/UL (ref 3.8–5.2)
SA TARGET DNA: NEGATIVE
SODIUM SERPL-SCNC: 135 MMOL/L (ref 135–145)
WBC # BLD AUTO: 12.3 10E3/UL (ref 4–11)

## 2025-05-23 PROCEDURE — 258N000003 HC RX IP 258 OP 636

## 2025-05-23 PROCEDURE — 99233 SBSQ HOSP IP/OBS HIGH 50: CPT | Performed by: STUDENT IN AN ORGANIZED HEALTH CARE EDUCATION/TRAINING PROGRAM

## 2025-05-23 PROCEDURE — 97161 PT EVAL LOW COMPLEX 20 MIN: CPT | Mod: GP

## 2025-05-23 PROCEDURE — 250N000013 HC RX MED GY IP 250 OP 250 PS 637

## 2025-05-23 PROCEDURE — 250N000009 HC RX 250

## 2025-05-23 PROCEDURE — 85027 COMPLETE CBC AUTOMATED: CPT

## 2025-05-23 PROCEDURE — 120N000001 HC R&B MED SURG/OB

## 2025-05-23 PROCEDURE — 97116 GAIT TRAINING THERAPY: CPT | Mod: GP

## 2025-05-23 PROCEDURE — 80048 BASIC METABOLIC PNL TOTAL CA: CPT

## 2025-05-23 PROCEDURE — 97530 THERAPEUTIC ACTIVITIES: CPT | Mod: GP

## 2025-05-23 PROCEDURE — 250N000011 HC RX IP 250 OP 636

## 2025-05-23 PROCEDURE — 36415 COLL VENOUS BLD VENIPUNCTURE: CPT

## 2025-05-23 RX ADMIN — ACETAMINOPHEN 975 MG: 325 TABLET, FILM COATED ORAL at 09:20

## 2025-05-23 RX ADMIN — AZITHROMYCIN MONOHYDRATE 250 MG: 500 INJECTION, POWDER, LYOPHILIZED, FOR SOLUTION INTRAVENOUS at 18:12

## 2025-05-23 RX ADMIN — LIDOCAINE: 40 CREAM TOPICAL at 22:46

## 2025-05-23 RX ADMIN — CITALOPRAM HYDROBROMIDE 20 MG: 20 TABLET ORAL at 09:14

## 2025-05-23 RX ADMIN — EXEMESTANE 25 MG: 25 TABLET ORAL at 09:14

## 2025-05-23 RX ADMIN — ACETAMINOPHEN 975 MG: 325 TABLET, FILM COATED ORAL at 17:33

## 2025-05-23 RX ADMIN — CEFTRIAXONE SODIUM 2 G: 2 INJECTION, POWDER, FOR SOLUTION INTRAMUSCULAR; INTRAVENOUS at 17:25

## 2025-05-23 RX ADMIN — ALLOPURINOL 300 MG: 300 TABLET ORAL at 09:14

## 2025-05-23 RX ADMIN — RIVAROXABAN 10 MG: 10 TABLET, FILM COATED ORAL at 17:25

## 2025-05-23 RX ADMIN — ROSUVASTATIN CALCIUM 5 MG: 5 TABLET, FILM COATED ORAL at 09:14

## 2025-05-23 ASSESSMENT — ACTIVITIES OF DAILY LIVING (ADL)
ADLS_ACUITY_SCORE: 44
DEPENDENT_IADLS:: CLEANING;COOKING;LAUNDRY;SHOPPING;MEAL PREPARATION;TRANSPORTATION
ADLS_ACUITY_SCORE: 44
ADLS_ACUITY_SCORE: 52
ADLS_ACUITY_SCORE: 44

## 2025-05-23 NOTE — CONSULTS
Care Management Initial Consult    General Information  Assessment completed with: Patient, Friend,    Type of CM/SW Visit: Initial Assessment    Primary Care Provider verified and updated as needed: Yes   Readmission within the last 30 days: no previous admission in last 30 days      Reason for Consult: discharge planning  Advance Care Planning: Advance Care Planning Reviewed: no concerns identified        Communication Assessment  Patient's communication style: spoken language (English or Bilingual)    Hearing Difficulty or Deaf: yes   Wear Glasses or Blind: yes    Cognitive  Cognitive/Neuro/Behavioral: WDL  Level of Consciousness: alert  Arousal Level: opens eyes spontaneously  Orientation: oriented x 4  Mood/Behavior: calm  Best Language: 0 - No aphasia  Speech: clear    Living Environment:   People in home: spouse     Current living Arrangements: apartment, condominium      Able to return to prior arrangements: other (see comments)     Family/Social Support:  Care provided by: self, spouse/significant other  Provides care for: no one  Marital Status:   Support system: , Friend, Children  Bradly       Description of Support System: Supportive, Involved    Support Assessment: Adequate family and caregiver support, Adequate social supports    Current Resources:   Patient receiving home care services: No  Community Resources: None  Equipment currently used at home: walker, rolling    Employment/Financial:  Employment Status: retired     Financial Concerns: none   Referral to Financial Worker: No     Does the patient's insurance plan have a 3 day qualifying hospital stay waiver?  Yes   Which insurance plan 3 day waiver is available? Alternative insurance waiver  Will the waiver be used for post-acute placement? Undetermined at this time    Lifestyle & Psychosocial Needs:  Social Drivers of Health     Food Insecurity: Low Risk  (5/22/2025)    Food Insecurity     Within the past 12 months, did you worry  that your food would run out before you got money to buy more?: No     Within the past 12 months, did the food you bought just not last and you didn t have money to get more?: No   Depression: Not at risk (9/18/2024)    Received from [facility]    PHQ-2     PHQ-2 Score: 0   Housing Stability: Low Risk  (5/22/2025)    Housing Stability     Do you have housing? : Yes     Are you worried about losing your housing?: No   Tobacco Use: Low Risk  (12/3/2024)    Patient History     Smoking Tobacco Use: Never     Smokeless Tobacco Use: Never     Passive Exposure: Not on file   Recent Concern: Tobacco Use - Medium Risk (12/3/2024)    Received from [facility]    Patient History     Smoking Tobacco Use: Former     Smokeless Tobacco Use: Never     Passive Exposure: Not on file   Financial Resource Strain: Low Risk  (5/22/2025)    Financial Resource Strain     Within the past 12 months, have you or your family members you live with been unable to get utilities (heat, electricity) when it was really needed?: No   Alcohol Use: Not on file   Transportation Needs: Low Risk  (5/22/2025)    Transportation Needs     Within the past 12 months, has lack of transportation kept you from medical appointments, getting your medicines, non-medical meetings or appointments, work, or from getting things that you need?: No   Physical Activity: Not on file   Interpersonal Safety: Not on file   Stress: Not on file   Social Connections: Not on file   Health Literacy: Not on file     Functional Status:  Prior to admission patient needed assistance:   Dependent ADLs:: Ambulation-walker  Dependent IADLs:: Cleaning, Cooking, Laundry, Shopping, Meal Preparation, Transportation    Mental Health Status:  Mental Health Status: No Current Concerns     Chemical Dependency Status:  Chemical Dependency Status: No Current Concerns           Values/Beliefs:  Spiritual, Cultural Beliefs, Hinduism Practices, Values that affect care: yes           Values/Beliefs Comment: Amy    Discussed  Partnership in Safe Discharge Planning  document with patient/family: No    Additional Information:  Care management consulted for discharge planning and DONALD completed chart review. Per ED Provider notes, patient presented to the emergency department via EMS with altered mental status and an elevated fever. DONALD met with patient and her two close friends that were at the bedside to introduce self/role, confirm information in the chart, and discuss discharge planning.     Patient reports that she comes from a senior co-op where she resides with her spouse. It is not assisted living and she has no services/supports at home, with an exception of having a  a couple times a month. She is independent with ADL's and ambulates with a walker at all times. She sets up her own medications in a medi-set. Her spouse enjoys doing most of the housework, errands, cooking, and finances. He transports patient. They are both retired and spouse is home just about 24/7 but leaves to see friends every now and then.     Recommendation is TCU and patient is agreeable. Provided her with a SNF list and medicare.gov information. She would prefer being close to home at [facility] or [facility] and said she needs a private room, she is agreeable to any associated private pay fee for a private room. She will research other backup options as well. Patient has finished radiation treatment and states she has a chemo infusion every 4 weeks. Her upcoming infusion is set for 6/11 and she said she takes other medications for cancer treatments. DONALD explained that TCU sites may want this appointment changed/pushed back and chemo medications may need to be on hold, but we will have to send referrals and see if it is a concern to the SNFs. She understood. Spoke about home with [facility] and 24/7 physical assistance from her spouse as a potential option instead of TCU. Everyone is still in  agreement with U.     JOLYNN Lange, LGSW   Social Work   [facility]

## 2025-05-23 NOTE — PROGRESS NOTES
Goal Outcome Evaluation:  PRIMARY Concern: Fever, AMS, Pneumonia  SAFETY RISK Concerns (fall risk, behaviors, etc.): n/a  Aggression Tool Color: green  Isolation/Type: N/A  Tests/Procedures for NEXT shift: none ordered  Consults? (Pending/following, signed-off?) PT recommending TCU, SW/CM pending  Where is patient from? (Home, TCU, etc.): Home   Other Important info for NEXT:  none  Anticipated DC date & active delays: TBD     SUMMARY NOTE:  Orientation/Cognitive: A&O x 4  Observation Goals (Met/ Not Met): Inpt  Mobility Level/Assist Equipment: AX1 with GB and walker  Antibiotics & Plan (IV/po, length of tx left): Rocephin and Azithromycin  Pain Management: Denies pain  Tele/VS/O2: VSS on RA, except for low grade fever  ABNL Lab/BG: Crt worse at 1.00, WBC increased to 12.3, Hgb down at 10.2, PLT of 143  Diet: Regular  Bowel/Bladder: Incontinent External catheter in place  Skin Concerns: scattered bruises  Drains/Devices: PIV SLed  Patient Stated Goal for Today: To sit up in chair

## 2025-05-23 NOTE — ED NOTES
[facility]  ED Nurse Handoff Report    ED Chief complaint: Altered Mental Status      ED Diagnosis:   Final diagnoses:   Community acquired pneumonia of right upper lobe of lung   Acute febrile illness   Acute encephalopathy       Code Status: Full Code    Allergies:   Allergies   Allergen Reactions    Statins Muscle Pain (Myalgia)     Muscle pain and stiffness    Pollen Extract      Pt. Has hayfever       Patient Story:     Jessica Qiu is a 74 year old female, anticoagulated on Xarelto, with a history of DVT, PE, Factor V Leiden, s/p  shunt, and follicular lymphoma amongst others presenting to the ED via EMS with altered mental status and fever at 102.4F. She is accompanied in the ED by her  who states that the patient has suffered ongoing confusion throughout the day today, up to the point where around 1300 she became for the most part incoherent. He states that the patient has experienced a cough for a couple days. She denies any abdominal pain, diarrhea, constipation, urinary symptoms. In the ED she struggles to provide history other than denying the aforementioned symptoms. She has not taken any Tylenol thus far today.     Focused Assessment:    A+Ox3 (disoriented to situation); PERRL; LOPEZ;  SBP's 120's  Sinus rhythm 90's bpm  >90% RA;  Productive Cough  T- max at Febrile 102.4 (O)    Treatments and/or interventions provided:   500 MG Tylenol PO  1.5 L NS bolus  4 MG Zofran IVP  2 G Ceftriaxone infusion  500 MG Zithromax infusion    Patient's response to treatments and/or interventions:   Improved body temp.    To be done/followed up on inpatient unit:    Continue with plan of care    Does this patient have any cognitive concerns?: Disoriented to situation    Activity level - Baseline/Home:  Independent and Walker  Activity Level - Current:   Not assessed    Patient's Preferred language: English   Needed?: No    Isolation: None  Infection: Not Applicable  Patient  tested for COVID 19 prior to admission: YES  Bariatric?: No    Vital Signs:   Vitals:    05/22/25 1600 05/22/25 1630 05/22/25 1858 05/22/25 1900   BP:  132/78 120/64 120/64   Pulse: 102 105 100 100   Resp: 30 24 20 24   Temp:   99.3  F (37.4  C)    TempSrc:       SpO2: 97% 94% 94% 92%   Weight:       Height:           Cardiac Rhythm:     Was the PSS-3 completed:   Yes  What interventions are required if any?  None needed.             Family Comments: Family member at bedside in ED.  OBS brochure/video discussed/provided to patient/family: Yes    For the majority of the shift this patient's behavior was Green.   Behavioral interventions performed were None needed.    ED NURSE PHONE NUMBER: *[id number]

## 2025-05-23 NOTE — PROGRESS NOTES
05/23/25 1104   Appointment Info   Signing Clinician's Name / Credentials (PT) Christiano Peng DPT   Living Environment   People in Home spouse   Current Living Arrangements independent living facility   Home Accessibility no concerns   Transportation Anticipated family or friend will provide   Living Environment Comments Reports living w/ spouse in ILF.   Self-Care   Usual Activity Tolerance moderate   Current Activity Tolerance fair   Equipment Currently Used at Home walker, rolling;shower chair;grab bar, tub/shower;grab bar, toilet   Fall history within last six months yes   Number of times patient has fallen within last six months   (Pt unsure, notes more than 5)   Activity/Exercise/Self-Care Comment Reports independent w/ mobility w/ use of 4WW inside of her building and FWW when leaving building. Typically independent w/ ADLs. Has been having increased falls recently.   General Information   Onset of Illness/Injury or Date of Surgery 05/22/25   Referring Physician Gonzalo Dean MD   Patient/Family Therapy Goals Statement (PT) Return to home   Pertinent History of Current Problem (include personal factors and/or comorbidities that impact the POC) Jessica Qiu is a 74 year old female with PMH recurrent follcular lymphoma, hydrocephalus s/p  shunt 2016, DVT on xarelto, HLD, anxiety admitted on 5/22/2025 with sepsis secondary to pneumonia.   Existing Precautions/Restrictions fall   Cognition   Affect/Mental Status (Cognition) WFL   Orientation Status (Cognition) oriented x 4   Follows Commands (Cognition) WFL   Pain Assessment   Patient Currently in Pain No   Integumentary/Edema   Integumentary/Edema no deficits were identifed   Range of Motion (ROM)   ROM Comment WFLs for mobility and transfers no formal testing completed   Strength (Manual Muscle Testing)   Strength Comments Generalized weakness and deconditioning noted w/ mobility and transfers no formal testing completed   Bed Mobility   Comment, (Bed  Mobility) Supine to sitting EOB w/ SBA   Transfers   Comment, (Transfers) Sit to stand w/ FWW and CGA   Gait/Stairs (Locomotion)   Comment, (Gait/Stairs) 5 ft w/ FWW and CGA   Balance   Balance Comments Unsteadiness noted w/ mobility and transfers no formal testing completed   Clinical Impression   Criteria for Skilled Therapeutic Intervention Yes, treatment indicated   PT Diagnosis (PT) Impaired ambulation   Influenced by the following impairments Impaired strength, balance and activity tolerance   Functional limitations due to impairments Impaired ADLs, IADLs and functional mobility   Clinical Presentation (PT Evaluation Complexity) stable   Clinical Presentation Rationale Clinical judgment   Clinical Decision Making (Complexity) low complexity   Planned Therapy Interventions (PT) balance training;bed mobility training;gait training;home exercise program;patient/family education;stair training;strengthening   Risk & Benefits of therapy have been explained evaluation/treatment results reviewed;care plan/treatment goals reviewed;risks/benefits reviewed;current/potential barriers reviewed;participants voiced agreement with care plan;participants included;patient   PT Total Evaluation Time   PT Eval, Low Complexity Minutes (60930) 10   Physical Therapy Goals   PT Frequency 5x/week   PT Predicted Duration/Target Date for Goal Attainment 06/02/25   PT Goals Bed Mobility;Transfers;Gait   PT: Bed Mobility Independent;Supine to/from sit   PT: Transfers Modified independent;Sit to/from stand;Assistive device   PT: Gait Modified independent;150 feet;Rolling walker   Interventions   Interventions Quick Adds Gait Training;Therapeutic Activity   Therapeutic Activity   Therapeutic Activities: dynamic activities to improve functional performance Minutes (74294) 10   Symptoms Noted During/After Treatment Fatigue   Treatment Detail/Skilled Intervention Pt supine in bed at start of session. Agreeable to PT. Pt w/ slow karely for  supine to sitting transfer. Able to scoot forward w/ cues. Denied dizziness/lightheadedness. Good sitting balance. Pt completing sit to stand x6 during session w/ FWW and CGA. Verbal cues for UE hand placement. Pt completing x10 sit to stands in a row w/ FWW and CGA. Cued for tall posture. Pt needing Min A x1 for brief placement but able to pull up in standing. Pt urinating on floor and chair and extra time needed for completion. Transfering EOB <> bedside chair w/ FWW and CGA. Seated in bedside chair w/ call light in place and chair alarm on.   Gait Training   Gait Training Minutes (70368) 12   Symptoms Noted During/After Treatment (Gait Training) fatigue   Treatment Detail/Skilled Intervention Pt ambulating ~25 ft x1 and 50 ft x2 w/ FWW and CGA to Min A x1. Frequent cues for walker proximity and tall posture. Slow karely noted. Heavy reliance on walker. Minimal clearance. Frequent cues to keep LEs inside walker w/ turns. Pt also ambulating ~25 ft w/ 4WW and CGA/Min A x1. Decreased balance noted w/ 4WW. Similar pattern to above.   PT Discharge Planning   PT Plan Amb distance, repeat STS, LE strengthening   PT Discharge Recommendation (DC Rec) Transitional Care Facility   PT Rationale for DC Rec Pt below baseline mobility. Typically independent w/ FWW vs 4WW for mobility. Pt can typically walk hallway distances in her building. Has had increasing weakness and falls lately. Anticipate when medically ready Pt would benefit from TCU to improve upon functional mobility and strengthening.   PT Brief overview of current status Goals of therapy will be to address safe mobility and make recs for d/c to next level of care. Pt and RN will continue to follow all falls risk precautions as documented by RN staff while hospitalized   PT Total Distance Amb During Session (feet) 150   Physical Therapy Time and Intention   Timed Code Treatment Minutes 22   Total Session Time (sum of timed and untimed services) 32

## 2025-05-23 NOTE — PROGRESS NOTES
[facility]  Hospitalist Progress Note    Assessment & Plan   Jessica Qiu is a 74 year old female with PMH recurrent follcular lymphoma, hydrocephalus s/p  shunt 2016, DVT on xarelto, HLD, anxiety who was admitted on 5/22/2025 with sepsis secondary to pneumonia.      Sepsis 2/2 CAP  Infectious Encephalopathy: Improving  Presented with confusion, fever, tachycardia, new cough, infectious workup with CXR showing right upper lobe pneumonia. flu/COVID/RSV negative. UA negative. Less likely meningitis without signs/symptoms or headache will monitor closely.  Last dose of maintenance ritixumab 5/14/2025, not on other chemotherapy currently. Started on ceftriaxone/azithromycin in ER.     - Spo2 goal > 90 %   - Remains hemodynamically stable on room air    - Continue ceftriaxone/azithromycin   - MRSA nares: Negative   - Sputum culture/gram stain ordered   - Diego PRN       Low-Grade Follicular Lymphoma, Stage II, recurrent  Follows with [facility] oncology, last seen 2024 PET revealed relapse after 14 months has undergone palliative cytoxan/doxorubicin/vincristine with rituximab and possible mini-chop per chart review, currently on maintenance rituxan every 4 weeks, last dose 5/14/25.   - Follow up with oncology as planned     Hyponatremia  Appears overall euvolemic, monitor.  - Na: 131 > 135      H/o idiopathic/normal pressure hydrocephalus s/p  shunt 2016  Noted, CT head without hydrocephalus to suggest malfunctioning shunt.      Lymphedema   - Hold PTA lasix 20mg and KCl for now      Anemia  Hemoglobin 11.4, at recent baseline.  - Monitor     H/o breast cancer  Stage IA (pT1c, pN0) invasive lobular carcinoma of the right breast, ER positive, IA positive, HER2 negative, s/p lumpectomy with SLNB in 2023, currently on exemestane (had diarrhea on letrozole).   - Continue Pta exemestane     Gout  - continue PTA allopurinol     Anxiety/depression  - Continue PTA citalopram 20mg daily     "  H/o DVT  - Continue PTA xarelto    Clinically Significant Risk Factors Present on Admission         # Hyponatremia: Lowest Na = 131 mmol/L in last 2 days, will monitor as appropriate  # Hypochloremia: Lowest Cl = 95 mmol/L in last 2 days, will monitor as appropriate  # Hypocalcemia: Lowest Ca = 8.5 mg/dL in last 2 days, will monitor and replace as appropriate      # Drug Induced Coagulation Defect: home medication list includes an anticoagulant medication         # Anemia: based on hgb <11       # Obesity: Estimated body mass index is 30.9 kg/m  as calculated from the following:    Height as of this encounter: 1.626 m (5' 4\").    Weight as of this encounter: 81.6 kg (180 lb).       # Financial/Environmental Concerns: none           Diet: Regular Diet Adult     DVT Prophylaxis: DOAC   Quintero Catheter: Not present  Lines: None     Cardiac Monitoring: None  Code Status: No CPR- Pre-arrest intubation OK      Disposition Plan      Expected Discharge Date: 05/24/2025      Destination: other (comment) (transitional care)        Entered: Zohreh Corbin MD 05/23/2025, 4:10 PM     Notes Reviewed: H&P    Care Team Updated: Nursing updated     Disposition: Likely 1-2 days pending improvement in symptoms. Will need TCU at discharge      Medically Ready for Discharge: Anticipated in 2-4 Days        Zohreh Corbin MD  Hospitalist Service   [facility]  Securely message with the Vocera Web Console (learn more here)         Medical Decision Making       60 MINUTES SPENT BY ME on the date of service doing chart review, history, exam, documentation & further activities per the note.           Interval History     No acute overnight events    This morning the patient state that she is feeling better than last night. Continues to feel weak.     -Data reviewed today: I reviewed all new labs and imaging results over the last 24 hours.     Physical Exam   Temp: 98.1  F (36.7  C) Temp src: Oral BP: " 118/63 Pulse: 88   Resp: 18 SpO2: 94 % O2 Device: None (Room air)    Vitals:    05/22/25 1546   Weight: 81.6 kg (180 lb)     Vital Signs with Ranges  Temp:  [98.1  F (36.7  C)-101.2  F (38.4  C)] 98.1  F (36.7  C)  Pulse:  [] 88  Resp:  [18-24] 18  BP: (111-132)/(59-78) 118/63  SpO2:  [92 %-95 %] 94 %  I/O last 3 completed shifts:  In: 540 [P.O.:540]  Out: 300 [Urine:300]      Constitutional: Awake, alert, cooperative, no apparent distress.  HEENT: PERRL, Normocephalic, without obvious abnormality, atraumatic, oral pharynx with moist mucus membranes  Pulmonary: No increased work of breathing, good air exchange, clear to auscultation bilaterally, no crackles or wheezing.  Cardiovascular: Regular rate and rhythm, normal S1 and S2  GI: Normal bowel sounds, soft, non-distended, non-tender.  Skin/Integumen: Visualized skin appeared clear.  Psych:  Alert and oriented to place, situation, person  Extremities: mild lower extremity edema       Medications   Current Facility-Administered Medications   Medication Dose Route Frequency Provider Last Rate Last Admin    Patient is already receiving anticoagulation with heparin, enoxaparin (LOVENOX), warfarin (COUMADIN)  or other anticoagulant medication   Does not apply Continuous PRN Gonzalo Dean MD         Current Facility-Administered Medications   Medication Dose Route Frequency Provider Last Rate Last Admin    allopurinol (ZYLOPRIM) tablet 300 mg  300 mg Oral Daily Gonzalo Dean MD   300 mg at 05/23/25 0914    azithromycin (ZITHROMAX) 250 mg in sodium chloride 0.9 % 250 mL intermittent infusion  250 mg Intravenous Q24H Gonzalo Dean MD        cefTRIAXone (ROCEPHIN) 2 g vial to attach to  ml bag for ADULTS or NS 50 ml bag for PEDS  2 g Intravenous Q24H Gonzalo Dean MD        citalopram (celeXA) tablet 20 mg  20 mg Oral Daily Gonzalo Dean MD   20 mg at 05/23/25 0914    exemestane (AROMASIN) tablet 25 mg  25 mg Oral Daily Gonzalo Dean MD   25 mg at 05/23/25  0914    [Held by provider] furosemide (LASIX) tablet 20 mg  20 mg Oral Daily Gonzalo Dean MD        [Held by provider] potassium chloride tomas ER (KLOR-CON M20) CR tablet 20 mEq  20 mEq Oral TID Gonzalo Dean MD        rivaroxaban ANTICOAGULANT (XARELTO) tablet 10 mg  10 mg Oral Daily with supper Gonzalo Dean MD   10 mg at 05/22/25 2259    rosuvastatin (CRESTOR) tablet 5 mg  5 mg Oral Daily Gonzalo Dean MD   5 mg at 05/23/25 0914    sodium chloride (PF) 0.9% PF flush 3 mL  3 mL Intracatheter Q8H Gonzalo Dean MD   3 mL at 05/23/25 0914    sodium chloride (PF) 0.9% PF flush 3 mL  3 mL Intracatheter Q8H [facility] Gonzalo Dean MD   3 mL at 05/23/25 0533       Data   Recent Labs   Lab 05/23/25  0640 05/22/25  1611   WBC 12.3* 10.4   HGB 10.2* 11.4*   MCV 99 100   * 166    131*   POTASSIUM 3.7 3.8   CHLORIDE 101 95*   CO2 22 22   BUN 10.8 11.4   CR 1.00* 0.98*   ANIONGAP 12 14   NICKOLAS 8.5* 8.8   GLC 84 103*   ALBUMIN  --  3.7   PROTTOTAL  --  6.1*   BILITOTAL  --  1.0   ALKPHOS  --  83   ALT  --  11   AST  --  18       Recent Results (from the past 24 hours)   XR Chest 2 Views    Narrative    EXAM: XR CHEST 2 VIEWS  LOCATION: [facility]  DATE: 5/22/2025    INDICATION: Fever  COMPARISON: Chest x-ray 01/07/2009; CT chest abdomen pelvis 4/11/2025      Impression    IMPRESSION: A large focal opacity seen in the right upper lobe and in the right infrahilar region, compatible with pneumonia. Left lung appears clear. No pleural effusion or pneumothorax. Heart size and pulmonary vascularity are within normal limits.   Left chest wall port catheter terminates in the right atrium. A  shunt again seen traversing the right chest wall.   Head CT w/o contrast    Narrative    EXAM: CT HEAD W/O CONTRAST  LOCATION: [facility]  DATE: 5/22/2025    INDICATION: confusion, fever,  shunt  COMPARISON: None.  TECHNIQUE: Routine CT Head without IV contrast. Multiplanar  reformats. Dose reduction techniques were used.    FINDINGS:  INTRACRANIAL CONTENTS: Right frontal approach tracheostomy catheter terminates in the anterior third ventricle. The ventricular system does not appear dilated. There are no comparison exams to assess for ventricular size and morphology stability. No   intracranial hemorrhage, extraaxial collection, or mass effect.  Mineralization of the right basal ganglia. No CT evidence of acute infarct. Linear encephalomalacia within the right parietal lobe extending from a previous miguel angel hole likely represents   encephalomalacia from previous catheter tract. Mild presumed chronic small vessel ischemic changes. Mild generalized volume loss. No hydrocephalus.     VISUALIZED ORBITS/SINUSES/MASTOIDS: Prior bilateral cataract surgery. Visualized portions of the orbits are otherwise unremarkable. Severe mucosal thickening of the left maxillary sinus. Mild mucosal thickening of the right maxillary sinus. No middle ear   or mastoid effusion.    BONES/SOFT TISSUES: No acute abnormality.      Impression    IMPRESSION:  1.  No acute intracranial process.  2.  Right frontal approach ventricular shunt catheter. No hydrocephalus. No comparison exams to assess for ventricular size and morphology stability.  3.  Chronic changes as above.

## 2025-05-23 NOTE — PROGRESS NOTES
Shift:  05/22/2025 [id number]  Summary: fever and AMS  Orientation: A&O x2 - disoriented to time and situation. PT does not remember how or why she came here. Cannot recall what she was last doing before being in the emergency room.   Activity Level: Assist 2 t/r. Alarms on.   Fall Risk: yes  Behavior & Aggression Tool Color: green  Pain Management: denies pain.   ABNL VS/O2: VSS on RA except 101.2 temp  Tele: -  ABNL Lab/BG: NA  Diet: NPO  Bowel/Bladder: Incontinent of B/B. Purewick in place  Drains/Devices: PIV SL   Skin: Scattered bruising, red due to heat/temperature.  Tests/Procedures for next shift: tbd  Anticipated DC date: pending  Other Important Info:  fall out of bed last night per ,

## 2025-05-23 NOTE — PLAN OF CARE
Goal Outcome Evaluation:      Plan of Care Reviewed With: patient, friend          Outcome Evaluation: Patient will discharge to TCU when ready.    JOLYNN Lange, LGSW   Social Work   [facility]

## 2025-05-23 NOTE — PLAN OF CARE
Goal Outcome Evaluation:      Plan of Care Reviewed With: patient, spouse    Overall Patient Progress: no changeOverall Patient Progress: no change    PRIMARY Concern: Fever, AMS, Pneumonia  SAFETY RISK Concerns (fall risk, behaviors, etc.): n/a  Aggression Tool Color: green  Isolation/Type: N/A  Tests/Procedures for NEXT shift: AM labs  Consults? (Pending/following, signed-off?) PT   Where is patient from? (Home, TCU, etc.): Home   Other Important info for NEXT:    Anticipated DC date & active delays: TBD     SUMMARY NOTE:  Orientation/Cognitive: A&O x 4 this shift  Observation Goals (Met/ Not Met): Inpt  Mobility Level/Assist Equipment: A 2 GBW, up to chair this shift.  Antibiotics & Plan (IV/po, length of tx left): IV Rocephin and Azithromycin  Pain Management: Denies pain  Tele/VS/O2: VSS on RA.  ABNL Lab/BG: Na  135, Cr 1.0, WBC 12.3, hbg 10.2  Diet: Regular  Bowel/Bladder: Incontinent External catheter in place when in bed. 1 BM today  Skin Concerns: scattered bruises  Drains/Devices: PIV SL  Patient Stated Goal for Today: Rest

## 2025-05-23 NOTE — PLAN OF CARE
Goal Outcome Evaluation:  PRIMARY Concern: Fever, AMS, Pneumonia  SAFETY RISK Concerns (fall risk, behaviors, etc.): n/a  Aggression Tool Color: green  Isolation/Type: N/A  Tests/Procedures for NEXT shift: AM labs  Consults? (Pending/following, signed-off?) PT   Where is patient from? (Home, TCU, etc.): Home   Other Important info for NEXT:    Anticipated DC date & active delays: TBD     SUMMARY NOTE:  Orientation/Cognitive: A&O x 3  does not remember the time.  Observation Goals (Met/ Not Met): Inpt  Mobility Level/Assist Equipment: A 2, has not been OOB yet  Antibiotics & Plan (IV/po, length of tx left): Rocephin and Azithromycin  Pain Management: Denies pain  Tele/VS/O2: VSS on RA.  ABNL Lab/BG: Na  131,   Diet: Regular  Bowel/Bladder: Incontinent External catheter in place  Skin Concerns: scattered bruises  Drains/Devices: PIV SL  Patient Stated Goal for Today: Rest

## 2025-05-23 NOTE — PHARMACY-ADMISSION MEDICATION HISTORY
Pharmacist Admission Medication History    Admission medication history is complete. The information provided in this note is only as accurate as the sources available at the time of the update.    Information Source(s): Patient, Family member, and CareEverywhere/SureScripts via in-person    Changes made to PTA medication list:  Added: Olanzapine, Potassium, Clindamycin, Exemestane  Deleted: Letrozole  Changed: None    Allergies reviewed with patient and updates made in EHR: no    Medication History Completed By: Lidia Knight RPH 5/22/2025 7:29 PM    PTA Med List   Medication Sig Last Dose/Taking    acetaminophen (TYLENOL) 500 MG tablet Take 500-1,000 mg by mouth every 6 hours as needed for mild pain Taking As Needed    allopurinol (ZYLOPRIM) 300 MG tablet Take 1 tablet (300 mg) by mouth daily 5/22/2025 Morning    calcium carbonate (OS-NICKOLAS) 500 MG tablet Take 1 tablet by mouth 2 times daily 5/22/2025 Morning    cholecalciferol (VITAMIN D3) 25 mcg (1000 units) capsule Take 1,000 Units by mouth 5/22/2025 Morning    citalopram (CELEXA) 20 MG tablet TAKE 1 TABLET BY MOUTH ONE TIME DAILY 5/22/2025 Morning    clindamycin (CLEOCIN-T) 1 % external gel Apply topically 2 times daily. 5/22/2025 Morning    diphenhydrAMINE (BENADRYL) 25 MG tablet Take 25 mg by mouth 2 times daily as needed for allergies  Taking As Needed    exemestane (AROMASIN) 25 MG tablet Take 25 mg by mouth daily. 5/22/2025 Morning    furosemide (LASIX) 20 MG tablet Take 20 mg by mouth daily. 5/22/2025    OLANZapine (ZYPREXA) 5 MG tablet Take 5 mg by mouth See Admin Instructions. Take 1 tablet at bedime x 4 days following chemotherapy Taking    potassium chloride tomas ER (KLOR-CON M20) 20 MEQ CR tablet Take 20 mEq by mouth 3 times daily. 5/22/2025 Morning    rivaroxaban ANTICOAGULANT (XARELTO ANTICOAGULANT) 10 MG TABS tablet Take 1 tablet (10 mg) by mouth daily (with dinner) 5/21/2025 Evening    rosuvastatin (CRESTOR) 5 MG tablet Take 1 tablet (5 mg) by mouth  daily 5/22/2025

## 2025-05-24 LAB
ADV 40+41 DNA STL QL NAA+NON-PROBE: NEGATIVE
ANION GAP SERPL CALCULATED.3IONS-SCNC: 10 MMOL/L (ref 7–15)
ASTRO TYP 1-8 RNA STL QL NAA+NON-PROBE: NEGATIVE
BUN SERPL-MCNC: 10.3 MG/DL (ref 8–23)
C CAYETANENSIS DNA STL QL NAA+NON-PROBE: NEGATIVE
C DIFF TOX B STL QL: NEGATIVE
CALCIUM SERPL-MCNC: 8.7 MG/DL (ref 8.8–10.4)
CAMPYLOBACTER DNA SPEC NAA+PROBE: NEGATIVE
CHLORIDE SERPL-SCNC: 101 MMOL/L (ref 98–107)
CREAT SERPL-MCNC: 0.82 MG/DL (ref 0.51–0.95)
CRYPTOSP DNA STL QL NAA+NON-PROBE: NEGATIVE
E COLI O157 DNA STL QL NAA+NON-PROBE: NORMAL
E HISTOLYT DNA STL QL NAA+NON-PROBE: NEGATIVE
EAEC ASTA GENE ISLT QL NAA+PROBE: NEGATIVE
EC STX1+STX2 GENES STL QL NAA+NON-PROBE: NEGATIVE
EGFRCR SERPLBLD CKD-EPI 2021: 74 ML/MIN/1.73M2
EPEC EAE GENE STL QL NAA+NON-PROBE: NEGATIVE
ERYTHROCYTE [DISTWIDTH] IN BLOOD BY AUTOMATED COUNT: 14.6 % (ref 10–15)
ETEC LTA+ST1A+ST1B TOX ST NAA+NON-PROBE: NEGATIVE
G LAMBLIA DNA STL QL NAA+NON-PROBE: NEGATIVE
GLUCOSE SERPL-MCNC: 86 MG/DL (ref 70–99)
HCO3 SERPL-SCNC: 25 MMOL/L (ref 22–29)
HCT VFR BLD AUTO: 28.4 % (ref 35–47)
HGB BLD-MCNC: 9.5 G/DL (ref 11.7–15.7)
LACTATE SERPL-SCNC: 0.6 MMOL/L (ref 0.7–2)
MCH RBC QN AUTO: 32.8 PG (ref 26.5–33)
MCHC RBC AUTO-ENTMCNC: 33.5 G/DL (ref 31.5–36.5)
MCV RBC AUTO: 98 FL (ref 78–100)
NOROVIRUS GI+II RNA STL QL NAA+NON-PROBE: NEGATIVE
P SHIGELLOIDES DNA STL QL NAA+NON-PROBE: NEGATIVE
PLATELET # BLD AUTO: 143 10E3/UL (ref 150–450)
POTASSIUM SERPL-SCNC: 3 MMOL/L (ref 3.4–5.3)
POTASSIUM SERPL-SCNC: 4.1 MMOL/L (ref 3.4–5.3)
RBC # BLD AUTO: 2.9 10E6/UL (ref 3.8–5.2)
RVA RNA STL QL NAA+NON-PROBE: NEGATIVE
SALMONELLA SP RPOD STL QL NAA+PROBE: NEGATIVE
SAPO I+II+IV+V RNA STL QL NAA+NON-PROBE: NEGATIVE
SHIGELLA SP+EIEC IPAH ST NAA+NON-PROBE: NEGATIVE
SODIUM SERPL-SCNC: 136 MMOL/L (ref 135–145)
V CHOLERAE DNA SPEC QL NAA+PROBE: NEGATIVE
VIBRIO DNA SPEC NAA+PROBE: NEGATIVE
WBC # BLD AUTO: 9.8 10E3/UL (ref 4–11)
Y ENTEROCOL DNA STL QL NAA+PROBE: NEGATIVE

## 2025-05-24 PROCEDURE — 85027 COMPLETE CBC AUTOMATED: CPT | Performed by: STUDENT IN AN ORGANIZED HEALTH CARE EDUCATION/TRAINING PROGRAM

## 2025-05-24 PROCEDURE — 99232 SBSQ HOSP IP/OBS MODERATE 35: CPT | Performed by: STUDENT IN AN ORGANIZED HEALTH CARE EDUCATION/TRAINING PROGRAM

## 2025-05-24 PROCEDURE — 84132 ASSAY OF SERUM POTASSIUM: CPT | Performed by: STUDENT IN AN ORGANIZED HEALTH CARE EDUCATION/TRAINING PROGRAM

## 2025-05-24 PROCEDURE — 250N000013 HC RX MED GY IP 250 OP 250 PS 637: Performed by: STUDENT IN AN ORGANIZED HEALTH CARE EDUCATION/TRAINING PROGRAM

## 2025-05-24 PROCEDURE — 82435 ASSAY OF BLOOD CHLORIDE: CPT | Performed by: STUDENT IN AN ORGANIZED HEALTH CARE EDUCATION/TRAINING PROGRAM

## 2025-05-24 PROCEDURE — 250N000013 HC RX MED GY IP 250 OP 250 PS 637

## 2025-05-24 PROCEDURE — 87507 IADNA-DNA/RNA PROBE TQ 12-25: CPT | Performed by: STUDENT IN AN ORGANIZED HEALTH CARE EDUCATION/TRAINING PROGRAM

## 2025-05-24 PROCEDURE — 87493 C DIFF AMPLIFIED PROBE: CPT | Performed by: STUDENT IN AN ORGANIZED HEALTH CARE EDUCATION/TRAINING PROGRAM

## 2025-05-24 PROCEDURE — 250N000011 HC RX IP 250 OP 636

## 2025-05-24 PROCEDURE — 258N000003 HC RX IP 258 OP 636

## 2025-05-24 PROCEDURE — 120N000001 HC R&B MED SURG/OB

## 2025-05-24 PROCEDURE — 250N000011 HC RX IP 250 OP 636: Performed by: STUDENT IN AN ORGANIZED HEALTH CARE EDUCATION/TRAINING PROGRAM

## 2025-05-24 PROCEDURE — 83605 ASSAY OF LACTIC ACID: CPT | Performed by: STUDENT IN AN ORGANIZED HEALTH CARE EDUCATION/TRAINING PROGRAM

## 2025-05-24 RX ORDER — POTASSIUM CHLORIDE 1.5 G/1.58G
40 POWDER, FOR SOLUTION ORAL ONCE
Status: COMPLETED | OUTPATIENT
Start: 2025-05-24 | End: 2025-05-24

## 2025-05-24 RX ORDER — POTASSIUM CHLORIDE 1.5 G/1.58G
20 POWDER, FOR SOLUTION ORAL ONCE
Status: COMPLETED | OUTPATIENT
Start: 2025-05-24 | End: 2025-05-24

## 2025-05-24 RX ORDER — HEPARIN SODIUM,PORCINE 10 UNIT/ML
5-10 VIAL (ML) INTRAVENOUS
Status: DISCONTINUED | OUTPATIENT
Start: 2025-05-24 | End: 2025-05-27 | Stop reason: HOSPADM

## 2025-05-24 RX ORDER — HEPARIN SODIUM (PORCINE) LOCK FLUSH IV SOLN 100 UNIT/ML 100 UNIT/ML
5-10 SOLUTION INTRAVENOUS
Status: DISCONTINUED | OUTPATIENT
Start: 2025-05-24 | End: 2025-05-27 | Stop reason: HOSPADM

## 2025-05-24 RX ORDER — HEPARIN SODIUM,PORCINE 10 UNIT/ML
5-10 VIAL (ML) INTRAVENOUS EVERY 24 HOURS
Status: DISCONTINUED | OUTPATIENT
Start: 2025-05-24 | End: 2025-05-27 | Stop reason: HOSPADM

## 2025-05-24 RX ADMIN — ACETAMINOPHEN 975 MG: 325 TABLET, FILM COATED ORAL at 04:25

## 2025-05-24 RX ADMIN — Medication 5 ML: at 19:32

## 2025-05-24 RX ADMIN — AZITHROMYCIN MONOHYDRATE 250 MG: 500 INJECTION, POWDER, LYOPHILIZED, FOR SOLUTION INTRAVENOUS at 18:21

## 2025-05-24 RX ADMIN — EXEMESTANE 25 MG: 25 TABLET ORAL at 08:46

## 2025-05-24 RX ADMIN — CITALOPRAM HYDROBROMIDE 20 MG: 20 TABLET ORAL at 08:46

## 2025-05-24 RX ADMIN — POTASSIUM CHLORIDE 20 MEQ: 1.5 POWDER, FOR SOLUTION ORAL at 10:20

## 2025-05-24 RX ADMIN — Medication 5 ML: at 15:11

## 2025-05-24 RX ADMIN — ALLOPURINOL 300 MG: 300 TABLET ORAL at 08:46

## 2025-05-24 RX ADMIN — CEFTRIAXONE SODIUM 2 G: 2 INJECTION, POWDER, FOR SOLUTION INTRAMUSCULAR; INTRAVENOUS at 17:24

## 2025-05-24 RX ADMIN — ROSUVASTATIN CALCIUM 5 MG: 5 TABLET, FILM COATED ORAL at 08:47

## 2025-05-24 RX ADMIN — Medication 5 ML: at 05:52

## 2025-05-24 RX ADMIN — RIVAROXABAN 10 MG: 10 TABLET, FILM COATED ORAL at 17:24

## 2025-05-24 RX ADMIN — POTASSIUM CHLORIDE 40 MEQ: 1.5 POWDER, FOR SOLUTION ORAL at 08:46

## 2025-05-24 ASSESSMENT — ACTIVITIES OF DAILY LIVING (ADL)
ADLS_ACUITY_SCORE: 52
ADLS_ACUITY_SCORE: 52
ADLS_ACUITY_SCORE: 64
ADLS_ACUITY_SCORE: 64
ADLS_ACUITY_SCORE: 52
ADLS_ACUITY_SCORE: 64
ADLS_ACUITY_SCORE: 64
ADLS_ACUITY_SCORE: 52
ADLS_ACUITY_SCORE: 64
ADLS_ACUITY_SCORE: 52
ADLS_ACUITY_SCORE: 52
ADLS_ACUITY_SCORE: 64
ADLS_ACUITY_SCORE: 52
ADLS_ACUITY_SCORE: 64
ADLS_ACUITY_SCORE: 52
ADLS_ACUITY_SCORE: 52
ADLS_ACUITY_SCORE: 64
ADLS_ACUITY_SCORE: 52

## 2025-05-24 NOTE — PROGRESS NOTES
[facility]  Hospitalist Progress Note    Assessment & Plan   Jessica Qiu is a 74 year old female with PMH recurrent follcular lymphoma, hydrocephalus s/p  shunt 2016, DVT on xarelto, HLD, anxiety who was admitted on 5/22/2025 with sepsis secondary to pneumonia.      Sepsis 2/2 CAP  Infectious Encephalopathy: Improving  Presented with confusion, fever, tachycardia, new cough, infectious workup with CXR showing right upper lobe pneumonia. flu/COVID/RSV negative. UA negative. Less likely meningitis without signs/symptoms or headache will monitor closely.  Last dose of maintenance ritixumab 5/14/2025, not on other chemotherapy currently. Started on ceftriaxone/azithromycin in ER.      - Spo2 goal > 90 %               - Remains hemodynamically stable on room air     - Continue ceftriaxone/azithromycin   - MRSA nares: Negative   - Sputum culture/gram stain ordered   - Diego PRN       Diarrhea  Started having watery stool on 5/24/25.   - Cdiff: Pending  - Enteric PCR: Pending     Low-Grade Follicular Lymphoma, Stage II, recurrent  Follows with [facility] oncology, last seen 2024 PET revealed relapse after 14 months has undergone palliative cytoxan/doxorubicin/vincristine with rituximab and possible mini-chop per chart review, currently on maintenance rituxan every 4 weeks, last dose 5/14/25.   - Follow up with oncology as planned     Hypokalemia  - Replace per protocol     Hyponatremia: Resolved   Appears overall euvolemic, monitor.  - Na: 131 > 135 > 136     H/o idiopathic/normal pressure hydrocephalus s/p  shunt 2016  Noted, CT head without hydrocephalus to suggest malfunctioning shunt.      Lymphedema   - Hold PTA lasix 20mg and KCl for now      Anemia  - Hgb: 11.4 > 10.2 > 9.5   - Continue to monitor      H/o breast cancer  Stage IA (pT1c, pN0) invasive lobular carcinoma of the right breast, ER positive, SD positive, HER2 negative, s/p lumpectomy with SLNB in 2023, currently on  "exemestane (had diarrhea on letrozole).   - Continue Pta exemestane     Gout  - continue PTA allopurinol     Anxiety/depression  - Continue PTA citalopram 20mg daily      H/o DVT  - Continue PTA xarelto    Clinically Significant Risk Factors        # Hypokalemia: Lowest K = 3 mmol/L in last 2 days, will replace as needed  # Hyponatremia: Lowest Na = 131 mmol/L in last 2 days, will monitor as appropriate  # Hypochloremia: Lowest Cl = 95 mmol/L in last 2 days, will monitor as appropriate  # Hypocalcemia: Lowest Ca = 8.5 mg/dL in last 2 days, will monitor and replace as appropriate                    # Obesity: Estimated body mass index is 30.9 kg/m  as calculated from the following:    Height as of this encounter: 1.626 m (5' 4\").    Weight as of this encounter: 81.6 kg (180 lb)., PRESENT ON ADMISSION     # Financial/Environmental Concerns: none           Diet: Regular Diet Adult     DVT Prophylaxis: DOAC   Quintero Catheter: Not present  Lines: PRESENT      Port a Cath Single Lumen Left Chest wall-Site Assessment: WDL      Cardiac Monitoring: None  Code Status: No CPR- Pre-arrest intubation OK      Disposition Plan       Expected Discharge Date: 05/27/2025      Destination: other (comment) (transitional care)        Entered: Zohreh Corbin MD 05/24/2025, 3:05 PM     Disposition: Likely 1-2 days pending no fevers and stool cx. Will need TCU at discharge       Medically Ready for Discharge: Anticipated in 2-4 Days        Zohreh Corbin MD  Hospitalist Service   [facility]  Securely message with the Vocera Web Console (learn more here)         Medical Decision Making       45 MINUTES SPENT BY ME on the date of service doing chart review, history, exam, documentation & further activities per the note.           Interval History     Overnight had a fever of 101F    This morning the patient started having watery stool. She states that she is feeling much better. Feeling more like herself. "     -Data reviewed today: I reviewed all new labs and imaging results over the last 24 hours.     Physical Exam   Temp: 99.4  F (37.4  C) Temp src: Oral BP: 115/68 Pulse: 93   Resp: 16 SpO2: 97 % O2 Device: None (Room air)    Vitals:    05/22/25 1546   Weight: 81.6 kg (180 lb)     Vital Signs with Ranges  Temp:  [98.3  F (36.8  C)-101.1  F (38.4  C)] 99.4  F (37.4  C)  Pulse:  [76-93] 93  Resp:  [16-18] 16  BP: (110-121)/(61-68) 115/68  SpO2:  [92 %-97 %] 97 %  I/O last 3 completed shifts:  In: 300 [P.O.:300]  Out: 350 [Urine:350]      Constitutional: Awake, alert, cooperative, no apparent distress.  HEENT: PERRL, Normocephalic, without obvious abnormality, atraumatic, oral pharynx with moist mucus membranes  Pulmonary: No increased work of breathing, good air exchange, clear to auscultation bilaterally, no crackles or wheezing.  Cardiovascular: Regular rate and rhythm, normal S1 and S2  GI: Normal bowel sounds, soft, non-distended, non-tender.  Skin/Integumen: Visualized skin appeared clear.  Psych:  Alert and oriented to place, situation, person  Extremities: mild lower extremity edema      Medications   Current Facility-Administered Medications   Medication Dose Route Frequency Provider Last Rate Last Admin    Patient is already receiving anticoagulation with heparin, enoxaparin (LOVENOX), warfarin (COUMADIN)  or other anticoagulant medication   Does not apply Continuous PRN Gonzalo Dean MD         Current Facility-Administered Medications   Medication Dose Route Frequency Provider Last Rate Last Admin    allopurinol (ZYLOPRIM) tablet 300 mg  300 mg Oral Daily Gonzalo Dean MD   300 mg at 05/24/25 0846    azithromycin (ZITHROMAX) 250 mg in sodium chloride 0.9 % 250 mL intermittent infusion  250 mg Intravenous Q24H Gonzalo Dean  mL/hr at 05/23/25 1812 250 mg at 05/23/25 1812    cefTRIAXone (ROCEPHIN) 2 g vial to attach to  ml bag for ADULTS or NS 50 ml bag for PEDS  2 g Intravenous Q24H Jermaine  MD Gonzalo   2 g at 05/23/25 1725    citalopram (celeXA) tablet 20 mg  20 mg Oral Daily Gonzalo Dean MD   20 mg at 05/24/25 0846    exemestane (AROMASIN) tablet 25 mg  25 mg Oral Daily Gonzalo Dean MD   25 mg at 05/24/25 0846    [Held by provider] furosemide (LASIX) tablet 20 mg  20 mg Oral Daily Gonzalo Dean MD        heparin lock flush 10 unit/mL injection 5-10 mL  5-10 mL Intracatheter Q24H Zohreh Corbin MD   5 mL at 05/24/25 0552    heparin lock flush 100 unit/mL injection 5-10 mL  5-10 mL Intracatheter Q28 Days Zohreh Corbin MD        [Held by provider] potassium chloride tomas ER (KLOR-CON M20) CR tablet 20 mEq  20 mEq Oral TID Gonzalo Dean MD        rivaroxaban ANTICOAGULANT (XARELTO) tablet 10 mg  10 mg Oral Daily with supper Gonzalo Dean MD   10 mg at 05/23/25 1725    rosuvastatin (CRESTOR) tablet 5 mg  5 mg Oral Daily Gonzalo Dean MD   5 mg at 05/24/25 0847    sodium chloride (PF) 0.9% PF flush 10-20 mL  10-20 mL Intracatheter Q28 Days Zohreh Corbin MD        sodium chloride (PF) 0.9% PF flush 3 mL  3 mL Intracatheter Q8H Gonzalo Dean MD   3 mL at 05/24/25 0847    sodium chloride (PF) 0.9% PF flush 3 mL  3 mL Intracatheter Q8H [facility] Gonzalo Dean MD   3 mL at 05/24/25 0552       Data   Recent Labs   Lab 05/24/25  0553 05/23/25  0640 05/22/25  1611   WBC 9.8 12.3* 10.4   HGB 9.5* 10.2* 11.4*   MCV 98 99 100   * 143* 166    135 131*   POTASSIUM 3.0* 3.7 3.8   CHLORIDE 101 101 95*   CO2 25 22 22   BUN 10.3 10.8 11.4   CR 0.82 1.00* 0.98*   ANIONGAP 10 12 14   NICKOLAS 8.7* 8.5* 8.8   GLC 86 84 103*   ALBUMIN  --   --  3.7   PROTTOTAL  --   --  6.1*   BILITOTAL  --   --  1.0   ALKPHOS  --   --  83   ALT  --   --  11   AST  --   --  18       No results found for this or any previous visit (from the past 24 hours).

## 2025-05-24 NOTE — PLAN OF CARE
Goal Outcome Evaluation:         Orientation: alert and oriented, forgetful  Aggression Stop Light: green  Activity: assist of one with walker  Diet/BS Checks: regular  Tele: none  IV Access/Drains: left chest port  Pain Management: denies pain  Abnormal VS/Results: vitals are with normal limits. Intermittent fever.   Bowel/Bladder: incontinent of bowel. Watery loose bowel this morning. Will obtain stool specimen if diarrhea continues.  Skin/Wounds: with in normal limits  Consults: PT  D/C Disposition: home when medically stable  Other Info:

## 2025-05-24 NOTE — PROGRESS NOTES
Pt arrived to floor at 10:30pm. VSS on RA with no complaint of pain. Alert and Oriented x3 disoriented to situation. Purewick put in place for incontinence. Second skin check done with Carmelina ROSEN Lidocaine cream placed on port to access.

## 2025-05-24 NOTE — PLAN OF CARE
Goal Outcome Evaluation:    Orientation: A&Ox4; intermittent confusion  Aggression Stop Light: Green  Activity: A1 GB/W. Not OOB this shift  Diet/BS Checks: Regular  Tele:  N/A  IV Access/Drains: L PIV SL, L chest port HL  Pain Management: Denies this shift  Abnormal VS/Results: VSS on RA. T max 101.1 - tylenol given  Bowel/Bladder: Incontinet b/b. PW in place. 1 BM this shift  Skin/Wounds: Blanchable redness to back from a heating pad burn per pt  Consults: PT  D/C Disposition: Pending

## 2025-05-25 LAB
ERYTHROCYTE [DISTWIDTH] IN BLOOD BY AUTOMATED COUNT: 14.7 % (ref 10–15)
HCT VFR BLD AUTO: 27.5 % (ref 35–47)
HGB BLD-MCNC: 9.2 G/DL (ref 11.7–15.7)
MCH RBC QN AUTO: 33 PG (ref 26.5–33)
MCHC RBC AUTO-ENTMCNC: 33.5 G/DL (ref 31.5–36.5)
MCV RBC AUTO: 99 FL (ref 78–100)
PLATELET # BLD AUTO: 159 10E3/UL (ref 150–450)
POTASSIUM SERPL-SCNC: 3.2 MMOL/L (ref 3.4–5.3)
POTASSIUM SERPL-SCNC: 3.6 MMOL/L (ref 3.4–5.3)
RBC # BLD AUTO: 2.79 10E6/UL (ref 3.8–5.2)
WBC # BLD AUTO: 7.3 10E3/UL (ref 4–11)

## 2025-05-25 PROCEDURE — 99232 SBSQ HOSP IP/OBS MODERATE 35: CPT | Performed by: STUDENT IN AN ORGANIZED HEALTH CARE EDUCATION/TRAINING PROGRAM

## 2025-05-25 PROCEDURE — 120N000001 HC R&B MED SURG/OB

## 2025-05-25 PROCEDURE — 250N000011 HC RX IP 250 OP 636: Performed by: STUDENT IN AN ORGANIZED HEALTH CARE EDUCATION/TRAINING PROGRAM

## 2025-05-25 PROCEDURE — 84132 ASSAY OF SERUM POTASSIUM: CPT | Performed by: STUDENT IN AN ORGANIZED HEALTH CARE EDUCATION/TRAINING PROGRAM

## 2025-05-25 PROCEDURE — 258N000003 HC RX IP 258 OP 636: Performed by: STUDENT IN AN ORGANIZED HEALTH CARE EDUCATION/TRAINING PROGRAM

## 2025-05-25 PROCEDURE — 85027 COMPLETE CBC AUTOMATED: CPT | Performed by: STUDENT IN AN ORGANIZED HEALTH CARE EDUCATION/TRAINING PROGRAM

## 2025-05-25 PROCEDURE — 250N000013 HC RX MED GY IP 250 OP 250 PS 637

## 2025-05-25 PROCEDURE — 250N000013 HC RX MED GY IP 250 OP 250 PS 637: Performed by: STUDENT IN AN ORGANIZED HEALTH CARE EDUCATION/TRAINING PROGRAM

## 2025-05-25 RX ORDER — POTASSIUM CHLORIDE 1.5 G/1.58G
40 POWDER, FOR SOLUTION ORAL ONCE
Status: COMPLETED | OUTPATIENT
Start: 2025-05-25 | End: 2025-05-25

## 2025-05-25 RX ORDER — LOPERAMIDE HYDROCHLORIDE 2 MG/1
2 CAPSULE ORAL 4 TIMES DAILY PRN
Status: DISCONTINUED | OUTPATIENT
Start: 2025-05-25 | End: 2025-05-27 | Stop reason: HOSPADM

## 2025-05-25 RX ADMIN — ACETAMINOPHEN 975 MG: 325 TABLET, FILM COATED ORAL at 23:58

## 2025-05-25 RX ADMIN — AZITHROMYCIN MONOHYDRATE 250 MG: 500 INJECTION, POWDER, LYOPHILIZED, FOR SOLUTION INTRAVENOUS at 17:26

## 2025-05-25 RX ADMIN — RIVAROXABAN 10 MG: 10 TABLET, FILM COATED ORAL at 17:26

## 2025-05-25 RX ADMIN — ACETAMINOPHEN 975 MG: 325 TABLET, FILM COATED ORAL at 05:08

## 2025-05-25 RX ADMIN — LOPERAMIDE HYDROCHLORIDE 2 MG: 2 CAPSULE ORAL at 08:34

## 2025-05-25 RX ADMIN — EXEMESTANE 25 MG: 25 TABLET ORAL at 08:34

## 2025-05-25 RX ADMIN — ALLOPURINOL 300 MG: 300 TABLET ORAL at 08:34

## 2025-05-25 RX ADMIN — ACETAMINOPHEN 975 MG: 325 TABLET, FILM COATED ORAL at 17:46

## 2025-05-25 RX ADMIN — ROSUVASTATIN CALCIUM 5 MG: 5 TABLET, FILM COATED ORAL at 08:34

## 2025-05-25 RX ADMIN — POTASSIUM CHLORIDE 40 MEQ: 1.5 POWDER, FOR SOLUTION ORAL at 08:34

## 2025-05-25 RX ADMIN — Medication 5 ML: at 18:25

## 2025-05-25 RX ADMIN — CEFTRIAXONE SODIUM 2 G: 2 INJECTION, POWDER, FOR SOLUTION INTRAMUSCULAR; INTRAVENOUS at 16:54

## 2025-05-25 RX ADMIN — CITALOPRAM HYDROBROMIDE 20 MG: 20 TABLET ORAL at 08:34

## 2025-05-25 RX ADMIN — Medication 5 ML: at 05:57

## 2025-05-25 RX ADMIN — Medication 5 ML: at 13:18

## 2025-05-25 ASSESSMENT — ACTIVITIES OF DAILY LIVING (ADL)
ADLS_ACUITY_SCORE: 64

## 2025-05-25 NOTE — PLAN OF CARE
[id number]  Orientation: A/Ox4, intermittent confusion   Aggression Stop Light: green  Activity: A1 GBW  Diet/BS Checks: regular- minimal appetite, endorsed interest in protein smoothies from dietary   Tele:  n/a  IV Access/Drains: L port H/L w/ intermittent abx   Pain Management: denies   Abnormal VS/Results: VSS on RA   Bowel/Bladder: Incontinent of b/b, multiple loose stools this shift   Skin/Wounds: Blanchable redness to back from heating pad burn per pt   Consults: PT   D/C Disposition: pending improvement of fevers, will need TCU per MD note     Other Info:   -Potassium replacement protocol- AM recheck   -Stool sample collected- negative for c-diff

## 2025-05-25 NOTE — PROGRESS NOTES
[facility]  Hospitalist Progress Note    Assessment & Plan   Jessica Qiu is a 74 year old female with PMH recurrent follcular lymphoma, hydrocephalus s/p  shunt 2016, DVT on xarelto, HLD, anxiety who was admitted on 5/22/2025 with sepsis secondary to pneumonia.      Sepsis 2/2 CAP  Infectious Encephalopathy: Resolved   Presented with confusion, fever, tachycardia, new cough, infectious workup with CXR showing right upper lobe pneumonia. flu/COVID/RSV negative. UA negative. Less likely meningitis without signs/symptoms or headache will monitor closely.  Last dose of maintenance ritixumab 5/14/2025, not on other chemotherapy currently. Started on ceftriaxone/azithromycin in ER.      - Spo2 goal > 90 %               - Remains hemodynamically stable on room air     - Continue ceftriaxone/azithromycin   - MRSA nares: Negative   - Sputum culture/gram stain ordered (not collected)  - Duonebs PRN       Diarrhea  Started having watery stool on 5/24/25.   - Cdiff: Negative  - Enteric PCR: Negative  - PRN Imodium      Low-Grade Follicular Lymphoma, Stage II, recurrent  Follows with [facility] oncology, last seen 2024 PET revealed relapse after 14 months has undergone palliative cytoxan/doxorubicin/vincristine with rituximab and possible mini-chop per chart review, currently on maintenance rituxan every 4 weeks, last dose 5/14/25.   - Follow up with oncology as planned     Hypokalemia  - Replace per protocol      Hyponatremia: Resolved   Appears overall euvolemic, monitor.  - Na: 131 > 135 > 136     H/o idiopathic/normal pressure hydrocephalus s/p  shunt 2016  Noted, CT head without hydrocephalus to suggest malfunctioning shunt.      Lymphedema   - Hold PTA lasix 20mg and KCl for now      Anemia  - Hgb: 11.4 > 10.2 > 9.5 > 9.2  - Continue to monitor      H/o breast cancer  Stage IA (pT1c, pN0) invasive lobular carcinoma of the right breast, ER positive, [place] positive, HER2 negative, s/p  "lumpectomy with SLNB in 2023, currently on exemestane (had diarrhea on letrozole).   - Continue Pta exemestane     Gout  - continue PTA allopurinol     Anxiety/depression  - Continue PTA citalopram 20mg daily      H/o DVT  - Continue PTA xarelto    Clinically Significant Risk Factors        # Hypokalemia: Lowest K = 3 mmol/L in last 2 days, will replace as needed                       # Obesity: Estimated body mass index is 30.9 kg/m  as calculated from the following:    Height as of this encounter: 1.626 m (5' 4\").    Weight as of this encounter: 81.6 kg (180 lb)., PRESENT ON ADMISSION     # Financial/Environmental Concerns: none           Diet: Regular Diet Adult     DVT Prophylaxis: DOAC   Quintero Catheter: Not present  Lines: PRESENT      Port a Cath Single Lumen Left Chest wall-Site Assessment: WDL      Cardiac Monitoring: None  Code Status: No CPR- Pre-arrest intubation OK      Disposition Plan       Expected Discharge Date: 05/26/2025      Destination: other (comment) (transitional care)        Entered: Zohreh Corbin MD 05/25/2025, 3:06 PM       Care Team Updated: Nursing updated     Disposition: Could be medically stable on 5/26/25 pending stable temps      Medically Ready for Discharge: Anticipated Tomorrow        Zohreh Corbin MD  Hospitalist Service   [facility]  Securely message with the Vocera Web Console (learn more here)         Medical Decision Making       45 MINUTES SPENT BY ME on the date of service doing chart review, history, exam, documentation & further activities per the note.           Interval History     No acute overnight events    This morning the patient states that she is doing okay. Continues to improve.     -Data reviewed today: I reviewed all new labs and imaging results over the last 24 hours.     Physical Exam   Temp: 98.4  F (36.9  C) Temp src: Oral BP: 117/68 Pulse: 84   Resp: 16 SpO2: 96 % O2 Device: None (Room air)    Vitals:    05/22/25 " 1546   Weight: 81.6 kg (180 lb)     Vital Signs with Ranges  Temp:  [98.4  F (36.9  C)-100.7  F (38.2  C)] 98.4  F (36.9  C)  Pulse:  [78-94] 84  Resp:  [16-20] 16  BP: ()/(63-69) 117/68  SpO2:  [92 %-96 %] 96 %  I/O last 3 completed shifts:  In: 640 [P.O.:640]  Out: 650 [Urine:650]      Constitutional: Awake, alert, cooperative, no apparent distress.  HEENT: PERRL, Normocephalic, without obvious abnormality, atraumatic, oral pharynx with moist mucus membranes  Pulmonary: No increased work of breathing, good air exchange, clear to auscultation bilaterally, no crackles or wheezing.  Cardiovascular: Regular rate and rhythm, normal S1 and S2  GI: Normal bowel sounds, soft, non-distended, non-tender.  Skin/Integumen: Visualized skin appeared clear.  Psych:  Alert and oriented to place, situation, person  Extremities: mild lower extremity edema      Medications   Current Facility-Administered Medications   Medication Dose Route Frequency Provider Last Rate Last Admin    Patient is already receiving anticoagulation with heparin, enoxaparin (LOVENOX), warfarin (COUMADIN)  or other anticoagulant medication   Does not apply Continuous PRN Gonzalo Dean MD         Current Facility-Administered Medications   Medication Dose Route Frequency Provider Last Rate Last Admin    allopurinol (ZYLOPRIM) tablet 300 mg  300 mg Oral Daily Gonzalo Dean MD   300 mg at 05/25/25 0834    azithromycin (ZITHROMAX) 250 mg in sodium chloride 0.9 % 250 mL intermittent infusion  250 mg Intravenous Q24H Gonzalo Dean  mL/hr at 05/23/25 1812 250 mg at 05/24/25 1821    cefTRIAXone (ROCEPHIN) 2 g vial to attach to  ml bag for ADULTS or NS 50 ml bag for PEDS  2 g Intravenous Q24H Gonzalo Dean MD   2 g at 05/24/25 1724    citalopram (celeXA) tablet 20 mg  20 mg Oral Daily Gonzalo Dean MD   20 mg at 05/25/25 0834    exemestane (AROMASIN) tablet 25 mg  25 mg Oral Daily Gonzalo Dean MD   25 mg at 05/25/25 0834    [Held by  provider] furosemide (LASIX) tablet 20 mg  20 mg Oral Daily Gonzalo Dean MD        heparin lock flush 10 unit/mL injection 5-10 mL  5-10 mL Intracatheter Q24H Zohreh Corbin MD   5 mL at 05/25/25 0557    heparin lock flush 100 unit/mL injection 5-10 mL  5-10 mL Intracatheter Q28 Days Zohreh Corbin MD        [Held by provider] potassium chloride tomas ER (KLOR-CON M20) CR tablet 20 mEq  20 mEq Oral TID Gonzalo Dean MD        rivaroxaban ANTICOAGULANT (XARELTO) tablet 10 mg  10 mg Oral Daily with supper Gonzalo Dean MD   10 mg at 05/24/25 1724    rosuvastatin (CRESTOR) tablet 5 mg  5 mg Oral Daily Gonzalo Dean MD   5 mg at 05/25/25 0834    sodium chloride (PF) 0.9% PF flush 10-20 mL  10-20 mL Intracatheter Q28 Days Zohreh Corbin MD           Data   Recent Labs   Lab 05/25/25  1318 05/25/25  0557 05/24/25  1515 05/24/25  0553 05/23/25  0640 05/22/25  1611   WBC  --  7.3  --  9.8 12.3* 10.4   HGB  --  9.2*  --  9.5* 10.2* 11.4*   MCV  --  99  --  98 99 100   PLT  --  159  --  143* 143* 166   NA  --   --   --  136 135 131*   POTASSIUM 3.6 3.2* 4.1 3.0* 3.7 3.8   CHLORIDE  --   --   --  101 101 95*   CO2  --   --   --  25 22 22   BUN  --   --   --  10.3 10.8 11.4   CR  --   --   --  0.82 1.00* 0.98*   ANIONGAP  --   --   --  10 12 14   NICKOLAS  --   --   --  8.7* 8.5* 8.8   GLC  --   --   --  86 84 103*   ALBUMIN  --   --   --   --   --  3.7   PROTTOTAL  --   --   --   --   --  6.1*   BILITOTAL  --   --   --   --   --  1.0   ALKPHOS  --   --   --   --   --  83   ALT  --   --   --   --   --  11   AST  --   --   --   --   --  18       No results found for this or any previous visit (from the past 24 hours).

## 2025-05-25 NOTE — PLAN OF CARE
[id number]  Pt had two loose BMs this shift.  Immodium given prn.  Incontinent of urine at times.  Potassium replaced.  Up with assist 1/GB/W.  Ambulated in hallway once today, otherwise up in chair twice.  Cognitively patient feels more clear.  Port HL'd except for antibiotics.  PT following.  Tylenol given this evening for headache.  Plan for discharge to TCU once medically ready.

## 2025-05-25 NOTE — PLAN OF CARE
Goal Outcome Evaluation:    Orientation: A&Ox4; intermittent confusion  Aggression Stop Light: Green  Activity: A1 GB/W  Diet/BS Checks: Regular  Tele:  N/A  IV Access/Drains: L chest port HL  Pain Management: Denies this shift  Abnormal VS/Results: VSS on RA. T max 100.7  Bowel/Bladder: Incontinet b/b. PW in place. 1 watery BM this shift  Skin/Wounds: Blanchable redness to back from a heating pad burn per pt  Consults: PT  D/C Disposition: Pending improvement of fevers  Other Info:   -K+ recheck this AM

## 2025-05-26 LAB
POTASSIUM SERPL-SCNC: 3.4 MMOL/L (ref 3.4–5.3)
POTASSIUM SERPL-SCNC: 3.9 MMOL/L (ref 3.4–5.3)

## 2025-05-26 PROCEDURE — 250N000013 HC RX MED GY IP 250 OP 250 PS 637: Performed by: STUDENT IN AN ORGANIZED HEALTH CARE EDUCATION/TRAINING PROGRAM

## 2025-05-26 PROCEDURE — 120N000001 HC R&B MED SURG/OB

## 2025-05-26 PROCEDURE — 250N000009 HC RX 250

## 2025-05-26 PROCEDURE — 250N000011 HC RX IP 250 OP 636: Performed by: STUDENT IN AN ORGANIZED HEALTH CARE EDUCATION/TRAINING PROGRAM

## 2025-05-26 PROCEDURE — 258N000003 HC RX IP 258 OP 636: Performed by: STUDENT IN AN ORGANIZED HEALTH CARE EDUCATION/TRAINING PROGRAM

## 2025-05-26 PROCEDURE — 84132 ASSAY OF SERUM POTASSIUM: CPT | Performed by: STUDENT IN AN ORGANIZED HEALTH CARE EDUCATION/TRAINING PROGRAM

## 2025-05-26 PROCEDURE — 250N000013 HC RX MED GY IP 250 OP 250 PS 637

## 2025-05-26 PROCEDURE — 99232 SBSQ HOSP IP/OBS MODERATE 35: CPT | Performed by: STUDENT IN AN ORGANIZED HEALTH CARE EDUCATION/TRAINING PROGRAM

## 2025-05-26 RX ORDER — FUROSEMIDE 20 MG/1
20 TABLET ORAL DAILY
Status: DISCONTINUED | OUTPATIENT
Start: 2025-05-26 | End: 2025-05-27 | Stop reason: HOSPADM

## 2025-05-26 RX ORDER — POTASSIUM CHLORIDE 1500 MG/1
40 TABLET, EXTENDED RELEASE ORAL ONCE
Status: DISCONTINUED | OUTPATIENT
Start: 2025-05-26 | End: 2025-05-26 | Stop reason: DRUGHIGH

## 2025-05-26 RX ORDER — POTASSIUM CHLORIDE 1.5 G/1.58G
40 POWDER, FOR SOLUTION ORAL ONCE
Status: COMPLETED | OUTPATIENT
Start: 2025-05-26 | End: 2025-05-26

## 2025-05-26 RX ADMIN — FUROSEMIDE 20 MG: 20 TABLET ORAL at 15:50

## 2025-05-26 RX ADMIN — Medication 5 ML: at 19:14

## 2025-05-26 RX ADMIN — ROSUVASTATIN CALCIUM 5 MG: 5 TABLET, FILM COATED ORAL at 08:30

## 2025-05-26 RX ADMIN — Medication 5 ML: at 13:42

## 2025-05-26 RX ADMIN — EXEMESTANE 25 MG: 25 TABLET ORAL at 08:30

## 2025-05-26 RX ADMIN — AZITHROMYCIN MONOHYDRATE 250 MG: 500 INJECTION, POWDER, LYOPHILIZED, FOR SOLUTION INTRAVENOUS at 18:02

## 2025-05-26 RX ADMIN — POTASSIUM CHLORIDE 40 MEQ: 1.5 POWDER, FOR SOLUTION ORAL at 08:51

## 2025-05-26 RX ADMIN — RIVAROXABAN 10 MG: 10 TABLET, FILM COATED ORAL at 17:26

## 2025-05-26 RX ADMIN — CITALOPRAM HYDROBROMIDE 20 MG: 20 TABLET ORAL at 08:30

## 2025-05-26 RX ADMIN — CEFTRIAXONE SODIUM 2 G: 2 INJECTION, POWDER, FOR SOLUTION INTRAMUSCULAR; INTRAVENOUS at 17:26

## 2025-05-26 RX ADMIN — ALLOPURINOL 300 MG: 300 TABLET ORAL at 08:30

## 2025-05-26 RX ADMIN — Medication 5 ML: at 05:35

## 2025-05-26 RX ADMIN — IPRATROPIUM BROMIDE AND ALBUTEROL SULFATE 3 ML: .5; 3 SOLUTION RESPIRATORY (INHALATION) at 11:33

## 2025-05-26 RX ADMIN — ACETAMINOPHEN 975 MG: 325 TABLET, FILM COATED ORAL at 14:06

## 2025-05-26 ASSESSMENT — ACTIVITIES OF DAILY LIVING (ADL)
ADLS_ACUITY_SCORE: 64
ADLS_ACUITY_SCORE: 69
ADLS_ACUITY_SCORE: 64
ADLS_ACUITY_SCORE: 69
ADLS_ACUITY_SCORE: 64
ADLS_ACUITY_SCORE: 68
ADLS_ACUITY_SCORE: 64
ADLS_ACUITY_SCORE: 69
ADLS_ACUITY_SCORE: 64
ADLS_ACUITY_SCORE: 68

## 2025-05-26 NOTE — PLAN OF CARE
Goal Outcome Evaluation:      Plan of Care Reviewed With: patient    Overall Patient Progress: improvingOverall Patient Progress: improving    Summary: Admitted with sepsis secondary to pneumonia  Orientation: A&O x4, intermittently forgetful  Aggression Stop Light: Green  Activity: A1 GBW. Dyspneic on exertion- Nebulizer x1 for wheezing. Educated on IS  Diet/BS Checks: Regular  Tele: N/A  IV Access/Drains: L chest port HL- Unit collect for labs  Pain Management: PRN tylenol given x1  Abnormal VS/Results: VSS on RA  Bowel/Bladder: Incontinet b/b. PW in place, small BM this shift  Skin/Wounds: Blanchable redness to back from a heating pad burn per pt  Consults: PT  D/C Disposition: TCU pending improvement of fevers. [facility] can take pt. Tomorrow 5/27.  Other Info:  K+ replaced- recheck 3.9

## 2025-05-26 NOTE — PROGRESS NOTES
Care Management Follow Up    Length of Stay (days): 4    Expected Discharge Date: 05/27/2025     Concerns to be Addressed: discharge planning     Patient plan of care discussed at interdisciplinary rounds: Yes    Anticipated Discharge Disposition: Transitional Care, Skilled Nursing Facility     Anticipated Discharge Services:    Anticipated Discharge DME: None    Patient/family educated on Medicare website which has current facility and service quality ratings: yes  Education Provided on the Discharge Plan: Yes  Patient/Family in Agreement with the Plan: yes    Referrals Placed by CM/SW: Post Acute Facilities  Private pay costs discussed: Not applicable    Discussed  Partnership in Safe Discharge Planning  document with patient/family: No     Handoff Completed: No, handoff not indicated or clinically appropriate    Additional Information:  Writer spoke with MD, plan for discharge for 5/27. Writer messaged Yoselin Thomas with [facility] to see if patient would be able to discharge tomorrow to [facility]. Per Yoselin, patient can go to [facility] mid day tomorrow. SW will follow up with weekday [facility] team to confirm discharge for tomorrow     Next Steps: Care Management will continue to follow     ADRIAN King

## 2025-05-26 NOTE — PLAN OF CARE
[id number]    Orientation: A&O x4; intermittent confusion  Aggression Stop Light: Green  Activity: A1 GBW  Diet/BS Checks: Regular  Tele: N/A  IV Access/Drains: L chest port HL  Pain Management: Denies   Abnormal VS/Results: VSS on RA except maxT 100.1. prn tylenol given x1  Bowel/Bladder: Incontinet b/b at times, 1 watery BM this shift.   Skin/Wounds: Blanchable redness to back from a heating pad burn per pt  Consults: PT  D/C Disposition: TCU pending improvement of fevers    Other Info:   - K+ replacement protocol, recheck this AM

## 2025-05-26 NOTE — PROGRESS NOTES
[facility]  Hospitalist Progress Note    Assessment & Plan   Jessica Qiu is a 74 year old female with PMH recurrent follcular lymphoma, hydrocephalus s/p  shunt 2016, DVT on xarelto, HLD, anxiety who was admitted on 5/22/2025 with sepsis secondary to pneumonia.      Sepsis 2/2 CAP  Infectious Encephalopathy: Resolved   Presented with confusion, fever, tachycardia, new cough, infectious workup with CXR showing right upper lobe pneumonia. flu/COVID/RSV negative. UA negative. Less likely meningitis without signs/symptoms or headache will monitor closely.  Last dose of maintenance ritixumab 5/14/2025, not on other chemotherapy currently. Started on ceftriaxone/azithromycin in ER.      - Spo2 goal > 90 %               - Remains hemodynamically stable on room air     - Continue ceftriaxone/azithromycin   - MRSA nares: Negative   - Sputum culture/gram stain ordered (not collected)  - Duonebs PRN       Diarrhea  Started having watery stool on 5/24/25.   - Cdiff: Negative  - Enteric PCR: Negative  - PRN Imodium      Low-Grade Follicular Lymphoma, Stage II, recurrent  Follows with [facility] oncology, last seen 2024 PET revealed relapse after 14 months has undergone palliative cytoxan/doxorubicin/vincristine with rituximab and possible mini-chop per chart review, currently on maintenance rituxan every 4 weeks, last dose 5/14/25.   - Follow up with oncology as planned     Hypokalemia  - Replace per protocol      Hyponatremia: Resolved   Appears overall euvolemic, monitor.  - Na: 131 > 135 > 136     H/o idiopathic/normal pressure hydrocephalus s/p  shunt 2016  Noted, CT head without hydrocephalus to suggest malfunctioning shunt.      Lymphedema   - Hold PTA lasix 20mg and KCl for now      Anemia  - Hgb: 11.4 > 10.2 > 9.5 > 9.2  - Continue to monitor      H/o breast cancer  Stage IA (pT1c, pN0) invasive lobular carcinoma of the right breast, ER positive, RI positive, HER2 negative, s/p  "lumpectomy with SLNB in 2023, currently on exemestane (had diarrhea on letrozole).   - Continue Pta exemestane     Gout  - continue PTA allopurinol     Anxiety/depression  - Continue PTA citalopram 20mg daily      H/o DVT  - Continue PTA xarelto    Clinically Significant Risk Factors        # Hypokalemia: Lowest K = 3.2 mmol/L in last 2 days, will replace as needed                       # Obesity: Estimated body mass index is 30.9 kg/m  as calculated from the following:    Height as of this encounter: 1.626 m (5' 4\").    Weight as of this encounter: 81.6 kg (180 lb)., PRESENT ON ADMISSION     # Financial/Environmental Concerns: none           Diet: Regular Diet Adult     DVT Prophylaxis: DOAC   Quintero Catheter: Not present  Lines: PRESENT      Port a Cath Single Lumen Left Chest wall-Site Assessment: WDL      Cardiac Monitoring: None  Code Status: No CPR- Pre-arrest intubation OK      Disposition Plan       Expected Discharge Date: 05/27/2025      Destination: other (comment) (transitional care)        Entered: Zohreh Corbin MD 05/26/2025, 12:38 PM       Family Updated: Patient has been updating family    Care Team Updated: Nursing and SW updated     Disposition: Reaching medical stability, tentatively as early as 5/27/25. SW aware.       Medically Ready for Discharge: Anticipated Tomorrow        Zohreh Corbin MD  Hospitalist Service   [facility]  Securely message with the Vocera Web Console (learn more here)         Medical Decision Making       45 MINUTES SPENT BY ME on the date of service doing chart review, history, exam, documentation & further activities per the note.           Interval History     No acute overnight events    This morning the patient states that she is doing well. Feeling tired this morning. Overall she is feeling closer to her baseline.     -Data reviewed today: I reviewed all new labs and imaging results over the last 24 hours.   Physical Exam   Temp: " 98.7  F (37.1  C) Temp src: Oral BP: 126/69 Pulse: 92   Resp: 18 SpO2: 96 % O2 Device: None (Room air)    Vitals:    05/22/25 1546   Weight: 81.6 kg (180 lb)     Vital Signs with Ranges  Temp:  [97.8  F (36.6  C)-100.1  F (37.8  C)] 98.7  F (37.1  C)  Pulse:  [80-92] 92  Resp:  [16-18] 18  BP: (106-126)/(55-69) 126/69  SpO2:  [95 %-97 %] 96 %  I/O last 3 completed shifts:  In: 1000 [P.O.:1000]  Out: 900 [Urine:900]      Constitutional: Awake, alert, cooperative, no apparent distress.  HEENT: PERRL, Normocephalic, without obvious abnormality, atraumatic, oral pharynx with moist mucus membranes  Pulmonary: Bilateral wheezes present  Cardiovascular: Regular rate and rhythm, normal S1 and S2  GI: Normal bowel sounds, soft, non-distended, non-tender.  Skin/Integumen: Visualized skin appeared clear.  Psych:  Alert and oriented to place, situation, person  Extremities: mild lower extremity edema      Medications   Current Facility-Administered Medications   Medication Dose Route Frequency Provider Last Rate Last Admin    Patient is already receiving anticoagulation with heparin, enoxaparin (LOVENOX), warfarin (COUMADIN)  or other anticoagulant medication   Does not apply Continuous PRN Gonzalo Dean MD         Current Facility-Administered Medications   Medication Dose Route Frequency Provider Last Rate Last Admin    allopurinol (ZYLOPRIM) tablet 300 mg  300 mg Oral Daily Gonzalo Dean MD   300 mg at 05/26/25 0830    azithromycin (ZITHROMAX) 250 mg in sodium chloride 0.9 % 250 mL intermittent infusion  250 mg Intravenous Q24H Zohreh Corbin  mL/hr at 05/23/25 1812 250 mg at 05/25/25 1726    cefTRIAXone (ROCEPHIN) 2 g vial to attach to  ml bag for ADULTS or NS 50 ml bag for PEDS  2 g Intravenous Q24H Zohreh Corbin MD   2 g at 05/25/25 1654    citalopram (celeXA) tablet 20 mg  20 mg Oral Daily Gonzalo Dean MD   20 mg at 05/26/25 0830    exemestane (AROMASIN) tablet 25 mg  25 mg Oral Daily  Gonzalo Dean MD   25 mg at 05/26/25 0830    [Held by provider] furosemide (LASIX) tablet 20 mg  20 mg Oral Daily Gonzalo Dean MD        heparin lock flush 10 unit/mL injection 5-10 mL  5-10 mL Intracatheter Q24H Zohreh Corbin MD   5 mL at 05/26/25 0535    heparin lock flush 100 unit/mL injection 5-10 mL  5-10 mL Intracatheter Q28 Days Zohreh Corbin MD        [Held by provider] potassium chloride tomas ER (KLOR-CON M20) CR tablet 20 mEq  20 mEq Oral TID Gonzalo Dean MD        rivaroxaban ANTICOAGULANT (XARELTO) tablet 10 mg  10 mg Oral Daily with supper Gonzalo Dean MD   10 mg at 05/25/25 1726    rosuvastatin (CRESTOR) tablet 5 mg  5 mg Oral Daily Gonzalo Dean MD   5 mg at 05/26/25 0830    sodium chloride (PF) 0.9% PF flush 10-20 mL  10-20 mL Intracatheter Q28 Days Zohreh Corbin MD           Data   Recent Labs   Lab 05/26/25  0535 05/25/25  1318 05/25/25  0557 05/24/25  1515 05/24/25  0553 05/23/25  0640 05/22/25  1611   WBC  --   --  7.3  --  9.8 12.3* 10.4   HGB  --   --  9.2*  --  9.5* 10.2* 11.4*   MCV  --   --  99  --  98 99 100   PLT  --   --  159  --  143* 143* 166   NA  --   --   --   --  136 135 131*   POTASSIUM 3.4 3.6 3.2*   < > 3.0* 3.7 3.8   CHLORIDE  --   --   --   --  101 101 95*   CO2  --   --   --   --  25 22 22   BUN  --   --   --   --  10.3 10.8 11.4   CR  --   --   --   --  0.82 1.00* 0.98*   ANIONGAP  --   --   --   --  10 12 14   NICKOLAS  --   --   --   --  8.7* 8.5* 8.8   GLC  --   --   --   --  86 84 103*   ALBUMIN  --   --   --   --   --   --  3.7   PROTTOTAL  --   --   --   --   --   --  6.1*   BILITOTAL  --   --   --   --   --   --  1.0   ALKPHOS  --   --   --   --   --   --  83   ALT  --   --   --   --   --   --  11   AST  --   --   --   --   --   --  18    < > = values in this interval not displayed.       No results found for this or any previous visit (from the past 24 hours).

## 2025-05-27 ENCOUNTER — DOCUMENTATION ONLY (OUTPATIENT)
Dept: GERIATRICS | Facility: CLINIC | Age: 75
End: 2025-05-27
Payer: COMMERCIAL

## 2025-05-27 ENCOUNTER — LAB REQUISITION (OUTPATIENT)
Dept: LAB | Facility: CLINIC | Age: 75
End: 2025-05-27
Payer: COMMERCIAL

## 2025-05-27 VITALS
OXYGEN SATURATION: 95 % | WEIGHT: 180 LBS | HEIGHT: 64 IN | DIASTOLIC BLOOD PRESSURE: 76 MMHG | TEMPERATURE: 98.8 F | BODY MASS INDEX: 30.73 KG/M2 | RESPIRATION RATE: 18 BRPM | SYSTOLIC BLOOD PRESSURE: 135 MMHG | HEART RATE: 87 BPM

## 2025-05-27 DIAGNOSIS — Z11.1 ENCOUNTER FOR SCREENING FOR RESPIRATORY TUBERCULOSIS: ICD-10-CM

## 2025-05-27 LAB
BACTERIA SPEC CULT: NO GROWTH
BACTERIA SPEC CULT: NO GROWTH
POTASSIUM SERPL-SCNC: 3.4 MMOL/L (ref 3.4–5.3)

## 2025-05-27 PROCEDURE — 99239 HOSP IP/OBS DSCHRG MGMT >30: CPT | Performed by: STUDENT IN AN ORGANIZED HEALTH CARE EDUCATION/TRAINING PROGRAM

## 2025-05-27 PROCEDURE — 250N000013 HC RX MED GY IP 250 OP 250 PS 637: Performed by: STUDENT IN AN ORGANIZED HEALTH CARE EDUCATION/TRAINING PROGRAM

## 2025-05-27 PROCEDURE — 84132 ASSAY OF SERUM POTASSIUM: CPT | Performed by: INTERNAL MEDICINE

## 2025-05-27 PROCEDURE — 250N000011 HC RX IP 250 OP 636: Performed by: STUDENT IN AN ORGANIZED HEALTH CARE EDUCATION/TRAINING PROGRAM

## 2025-05-27 PROCEDURE — 250N000013 HC RX MED GY IP 250 OP 250 PS 637

## 2025-05-27 RX ORDER — IPRATROPIUM BROMIDE AND ALBUTEROL SULFATE 2.5; .5 MG/3ML; MG/3ML
3 SOLUTION RESPIRATORY (INHALATION) EVERY 4 HOURS PRN
DISCHARGE
Start: 2025-05-27

## 2025-05-27 RX ORDER — CEFDINIR 300 MG/1
300 CAPSULE ORAL 2 TIMES DAILY
DISCHARGE
Start: 2025-05-27 | End: 2025-05-29

## 2025-05-27 RX ORDER — LOPERAMIDE HYDROCHLORIDE 2 MG/1
2 CAPSULE ORAL 4 TIMES DAILY PRN
DISCHARGE
Start: 2025-05-27

## 2025-05-27 RX ADMIN — ROSUVASTATIN CALCIUM 5 MG: 5 TABLET, FILM COATED ORAL at 08:57

## 2025-05-27 RX ADMIN — Medication 5 ML: at 05:34

## 2025-05-27 RX ADMIN — FUROSEMIDE 20 MG: 20 TABLET ORAL at 08:58

## 2025-05-27 RX ADMIN — ALLOPURINOL 300 MG: 300 TABLET ORAL at 08:58

## 2025-05-27 RX ADMIN — EXEMESTANE 25 MG: 25 TABLET ORAL at 08:57

## 2025-05-27 RX ADMIN — Medication 5 ML: at 12:42

## 2025-05-27 RX ADMIN — CITALOPRAM HYDROBROMIDE 20 MG: 20 TABLET ORAL at 08:58

## 2025-05-27 ASSESSMENT — ACTIVITIES OF DAILY LIVING (ADL)
ADLS_ACUITY_SCORE: 64
ADLS_ACUITY_SCORE: 68
ADLS_ACUITY_SCORE: 64
ADLS_ACUITY_SCORE: 64
ADLS_ACUITY_SCORE: 68
ADLS_ACUITY_SCORE: 64
ADLS_ACUITY_SCORE: 64

## 2025-05-27 NOTE — PLAN OF CARE
Goal Outcome Evaluation:      Orientation: A&O x4; int confusion  Aggression Stop Light: Green  Activity: A1 GBW  Diet/BS Checks: Regular  Tele: N/A  IV Access/Drains: L chest port HL  Pain Management: Denies   Abnormal VS/Results: VSS on RA  Bowel/Bladder: Incontinet b/b at times, no BM this shift.   Skin/Wounds: Blanchable redness to back from a heating pad burn per pt  Consults: PT, SW  D/C Disposition: To [facility], pending SW confirmation today

## 2025-05-27 NOTE — DISCHARGE SUMMARY
[facility]  Hospitalist Discharge Summary     Admit Date:  5/22/2025  Discharge Date:     5/27/2025  Discharging Provider: Zohreh Corbin MD    PRIMARY CARE PROVIDER:    Mansoor Melton     DISCHARGE DIAGNOSES:     Sepsis 2/2 CAP  Infectious Encephalopathy: Resolved   Diarrhea: Resolving   Low-Grade Follicular Lymphoma, Stage II, recurrent   Hypokalemia: Resolved   Hyponatremia: Resolved   H/o idiopathic/normal pressure hydrocephalus s/p  shunt 2016   Lymphedema   Anemia: Stable   H/o breast cancer   Gout   Anxiety/depression   H/o DVT   Thrombocytopenia: Resolved    BRIEF HISTORY OF PRESENT ILLNESS/HOSPITAL COURSE:   Jessica Qiu is a 74 year old female with PMH recurrent follcular lymphoma, hydrocephalus s/p  shunt 2016, DVT on xarelto, HLD, anxiety who was admitted on 5/22/2025 with sepsis secondary to pneumonia.      Sepsis 2/2 CAP  Infectious Encephalopathy: Resolved   Presented with confusion, fever, tachycardia, new cough, infectious workup with CXR showing right upper lobe pneumonia. flu/COVID/RSV negative. UA negative. Less likely meningitis without signs/symptoms or headache will monitor closely.  Last dose of maintenance ritixumab 5/14/2025, not on other chemotherapy currently. Started on ceftriaxone/azithromycin in ER. She remained stable on room air during her stay. She completed a course of Azithromycin during her stay. She was transitioned to Cefdinir 300mg BID for another 2 days to complete 7 days.      - MRSA nares: Negative   - Blood Cx: No growth to date     - Completed 5 day course of Azithromycin  - Transition IV Ceftriaxone to PO Cefdinir 300mg BID for another 2 days to complete 7 days   - Duonebs PRN       Diarrhea: Resolving  Started having watery stool on 5/24/25.   - Cdiff: Negative  - Enteric PCR: Negative  - PRN Imodium      Low-Grade Follicular Lymphoma, Stage II, recurrent  Follows with Replaced by [facility] oncology, last seen 2024 PET revealed relapse  "after 14 months has undergone palliative cytoxan/doxorubicin/vincristine with rituximab and possible mini-chop per chart review, currently on maintenance rituxan every 4 weeks, last dose 5/14/25.   - Follow up with oncology as planned     Hypokalemia: Resolved   - Resume home potassium replacement at discharge      Hyponatremia: Resolved   Appears overall euvolemic, monitor.  - Na: 131 > 135 > 136     H/o idiopathic/normal pressure hydrocephalus s/p  shunt 2016  Noted, CT head without hydrocephalus to suggest malfunctioning shunt.      Lymphedema   - Continue PTA lasix 20mg     Anemia: Stable  No clinical signs of bleeding   - Hgb: 11.4 > 10.2 > 9.5 > 9.2  - Continue to monitor     Thrombocytopenia: Resolved  - Plt: 143 > 143 > 159      H/o breast cancer  Stage IA (pT1c, pN0) invasive lobular carcinoma of the right breast, ER positive, UT positive, HER2 negative, s/p lumpectomy with SLNB in 2023, currently on exemestane (had diarrhea on letrozole).   - Continue Pta exemestane     Gout  - continue PTA allopurinol     Anxiety/depression  - Continue PTA citalopram 20mg daily      H/o DVT  - Continue PTA xarelto       Clinically Significant Risk Factors                              # Obesity: Estimated body mass index is 30.9 kg/m  as calculated from the following:    Height as of this encounter: 1.626 m (5' 4\").    Weight as of this encounter: 81.6 kg (180 lb).      # Financial/Environmental Concerns: none              TOTAL DISCHARGE TIME:  Zohreh HOLLEY MD, personally saw the patient today and spent greater than 30 minutes discharging this patient.    Zohreh Corbin MD  [facility]  Hospitalist Service   Securely message with the Vocera Web Console (learn more here)  Text page via [employer] Paging/Directory        Significant Results and Procedures   As above    Pending Results   These results will be followed up by Hospitalist   Unresulted Labs Ordered in the Past 30 Days of " this Admission       Date and Time Order Name Status Description    5/22/2025  4:06 PM Blood Culture Peripheral blood (BC) Arm, Left Preliminary     5/22/2025  4:06 PM Blood Culture Peripheral blood (BC) Arm, Left Preliminary             Code Status   DNR- Okay with pre-arrest intubation        Primary Care Physician   Mansoor Melton    Physical Exam   Temp: 98.8  F (37.1  C) Temp src: Oral BP: 135/76 Pulse: 87   Resp: 18 SpO2: 95 % O2 Device: None (Room air)    Vitals:    05/22/25 1546   Weight: 81.6 kg (180 lb)     Vital Signs with Ranges  Temp:  [98.2  F (36.8  C)-98.8  F (37.1  C)] 98.8  F (37.1  C)  Pulse:  [84-98] 87  Resp:  [18] 18  BP: (114-139)/(67-76) 135/76  SpO2:  [92 %-96 %] 95 %  No intake/output data recorded.    Constitutional: Awake, alert, cooperative, no apparent distress.  HEENT: PERRL, Normocephalic, without obvious abnormality, atraumatic, oral pharynx with moist mucus membranes  Pulmonary: Clear breath sounds bilaterally  Cardiovascular: Regular rate and rhythm, normal S1 and S2  GI: Normal bowel sounds, soft, non-distended, non-tender.  Skin/Integumen: Visualized skin appeared clear.  Psych:  Alert and oriented to place, situation, person  Extremities: mild lower extremity edema    Discharge Disposition   Discharged to short-term care facility  Condition at discharge: Stable    Consultations This Hospital Stay   PHYSICAL THERAPY ADULT IP CONSULT  CARE MANAGEMENT / SOCIAL WORK IP CONSULT  PHYSICAL THERAPY ADULT IP CONSULT  OCCUPATIONAL THERAPY ADULT IP CONSULT      Discharge Orders      General info for SNF    Length of Stay Estimate: Short Term Care: Estimated # of Days <30  Condition at Discharge: Improving  Level of care:skilled   Rehabilitation Potential: Fair  Admission H&P remains valid and up-to-date: Yes  Recent Chemotherapy: Date: 5/14/25  Use Nursing Home Standing Orders: Yes     Mantoux instructions    Give two-step Mantoux (PPD) Per Facility Policy Yes     Follow Up and recommended  labs and tests    Follow up with Nursing home physician in 1 week.  The following labs/tests are recommended: BMP and CBC.     Reason for your hospital stay    You were admitted to the hospital for sepsis due to community acquired pneumonia.     Activity - Up with nursing assistance     No CPR- Pre-arrest intubation OK     Physical Therapy Adult Consult    Evaluate and treat as clinically indicated.    Reason:  Generalized Weakness     Occupational Therapy Adult Consult    Evaluate and treat as clinically indicated.    Reason:  Generalized Weakness     Fall precautions     Diet    Follow this diet upon discharge: Regular Diet Adult     Discharge Medications   Current Discharge Medication List        START taking these medications    Details   cefdinir (OMNICEF) 300 MG capsule Take 1 capsule (300 mg) by mouth 2 times daily for 2 days.    Associated Diagnoses: Community acquired pneumonia of right upper lobe of lung      ipratropium - albuterol 0.5 mg/2.5 mg/3 mL (DUONEB) 0.5-2.5 (3) MG/3ML neb solution Take 1 vial (3 mLs) by nebulization every 4 hours as needed for shortness of breath, wheezing or cough.    Associated Diagnoses: Community acquired pneumonia of right upper lobe of lung      loperamide (IMODIUM) 2 MG capsule Take 1 capsule (2 mg) by mouth 4 times daily as needed for diarrhea.    Associated Diagnoses: Diarrhea, unspecified type           CONTINUE these medications which have NOT CHANGED    Details   acetaminophen (TYLENOL) 500 MG tablet Take 500-1,000 mg by mouth every 6 hours as needed for mild pain      allopurinol (ZYLOPRIM) 300 MG tablet Take 1 tablet (300 mg) by mouth daily  Qty: 90 tablet, Refills: 3    Associated Diagnoses: Nephrolithiasis      calcium carbonate (OS-NICKOLAS) 500 MG tablet Take 1 tablet by mouth 2 times daily      cholecalciferol (VITAMIN D3) 25 mcg (1000 units) capsule Take 1,000 Units by mouth      citalopram (CELEXA) 20 MG tablet TAKE 1 TABLET BY MOUTH ONE TIME DAILY  Qty: 30  tablet, Refills: 0    Associated Diagnoses: Depression with anxiety      clindamycin (CLEOCIN-T) 1 % external gel Apply topically 2 times daily.      diphenhydrAMINE (BENADRYL) 25 MG tablet Take 25 mg by mouth 2 times daily as needed for allergies       exemestane (AROMASIN) 25 MG tablet Take 25 mg by mouth daily.      furosemide (LASIX) 20 MG tablet Take 20 mg by mouth daily.      OLANZapine (ZYPREXA) 5 MG tablet Take 5 mg by mouth See Admin Instructions. Take 1 tablet at bedime x 4 days following chemotherapy      potassium chloride tomas ER (KLOR-CON M20) 20 MEQ CR tablet Take 20 mEq by mouth 3 times daily.      rivaroxaban ANTICOAGULANT (XARELTO ANTICOAGULANT) 10 MG TABS tablet Take 1 tablet (10 mg) by mouth daily (with dinner)  Qty: 90 tablet, Refills: 3    Comments: Refill when due  Associated Diagnoses: Chronic deep vein thrombosis (DVT) of femoral vein of left lower extremity (H); History of pulmonary embolism      rosuvastatin (CRESTOR) 5 MG tablet Take 1 tablet (5 mg) by mouth daily  Qty: 30 tablet, Refills: 5    Associated Diagnoses: Dyslipidemia, goal LDL below 70           Allergies   Allergies   Allergen Reactions    Statins Muscle Pain (Myalgia)     Muscle pain and stiffness    Pollen Extract      Pt. Has hayfever     Data   Most Recent 3 CBC's:  Recent Labs   Lab Test 05/25/25  0557 05/24/25  0553 05/23/25  0640   WBC 7.3 9.8 12.3*   HGB 9.2* 9.5* 10.2*   MCV 99 98 99    143* 143*      Most Recent 3 BMP's:  Recent Labs   Lab Test 05/27/25  0534 05/26/25  1341 05/26/25  0535 05/24/25  1515 05/24/25  0553 05/23/25  0640 05/22/25  1611   NA  --   --   --   --  136 135 131*   POTASSIUM 3.4 3.9 3.4   < > 3.0* 3.7 3.8   CHLORIDE  --   --   --   --  101 101 95*   CO2  --   --   --   --  25 22 22   BUN  --   --   --   --  10.3 10.8 11.4   CR  --   --   --   --  0.82 1.00* 0.98*   ANIONGAP  --   --   --   --  10 12 14   NICKOLAS  --   --   --   --  8.7* 8.5* 8.8   GLC  --   --   --   --  86 84 103*    < > =  values in this interval not displayed.     Most Recent 2 LFT's:  Recent Labs   Lab Test 05/22/25  1611 02/26/23  0458   AST 18 22   ALT 11 25   ALKPHOS 83 77   BILITOTAL 1.0 0.3     Most Recent INR's and Anticoagulation Dosing History:  Anticoagulation Dose History          Latest Ref Rng & Units 5/27/2020   Recent Dosing and Labs   INR 0.86 - 1.14 1.11      Most Recent 3 Troponin's:  Recent Labs   Lab Test 05/31/20  2141   TROPI <0.015     Most Recent Cholesterol Panel:  Recent Labs   Lab Test 06/17/24  1025   CHOL 150   LDL 82   HDL 44*   TRIG 118     Most Recent 6 Bacteria Isolates From Any Culture (See EPIC Reports for Culture Details):  Recent Labs   Lab Test 05/29/20  1205 05/27/20  1116 05/27/20  1055   CULT No growth No growth No growth  No growth     Most Recent TSH, T4 and A1c Labs:No lab results found.  Results for orders placed or performed during the hospital encounter of 05/22/25   XR Chest 2 Views    Narrative    EXAM: XR CHEST 2 VIEWS  LOCATION: [facility]  DATE: 5/22/2025    INDICATION: Fever  COMPARISON: Chest x-ray 01/07/2009; CT chest abdomen pelvis 4/11/2025      Impression    IMPRESSION: A large focal opacity seen in the right upper lobe and in the right infrahilar region, compatible with pneumonia. Left lung appears clear. No pleural effusion or pneumothorax. Heart size and pulmonary vascularity are within normal limits.   Left chest wall port catheter terminates in the right atrium. A  shunt again seen traversing the right chest wall.   Head CT w/o contrast    Narrative    EXAM: CT HEAD W/O CONTRAST  LOCATION: [facility]  DATE: 5/22/2025    INDICATION: confusion, fever,  shunt  COMPARISON: None.  TECHNIQUE: Routine CT Head without IV contrast. Multiplanar reformats. Dose reduction techniques were used.    FINDINGS:  INTRACRANIAL CONTENTS: Right frontal approach tracheostomy catheter terminates in the anterior third ventricle. The  ventricular system does not appear dilated. There are no comparison exams to assess for ventricular size and morphology stability. No   intracranial hemorrhage, extraaxial collection, or mass effect.  Mineralization of the right basal ganglia. No CT evidence of acute infarct. Linear encephalomalacia within the right parietal lobe extending from a previous miguel angel hole likely represents   encephalomalacia from previous catheter tract. Mild presumed chronic small vessel ischemic changes. Mild generalized volume loss. No hydrocephalus.     VISUALIZED ORBITS/SINUSES/MASTOIDS: Prior bilateral cataract surgery. Visualized portions of the orbits are otherwise unremarkable. Severe mucosal thickening of the left maxillary sinus. Mild mucosal thickening of the right maxillary sinus. No middle ear   or mastoid effusion.    BONES/SOFT TISSUES: No acute abnormality.      Impression    IMPRESSION:  1.  No acute intracranial process.  2.  Right frontal approach ventricular shunt catheter. No hydrocephalus. No comparison exams to assess for ventricular size and morphology stability.  3.  Chronic changes as above.

## 2025-05-27 NOTE — PLAN OF CARE
Physical Therapy Discharge Summary    Reason for therapy discharge:    Discharged to transitional care facility.    Progress towards therapy goal(s). See goals on Care Plan in [facility] electronic health record for goal details.  Goals not met.  Barriers to achieving goals:   discharge from facility.    Therapy recommendation(s):    Continued therapy is recommended.  Rationale/Recommendations:  to advance indepdence with functional mobility..      Summary completed from chart review patient not seen by documenting therapist

## 2025-05-27 NOTE — DISCHARGE INSTRUCTIONS
You were admitted with Sepsis due to Pneumonia  Read and refer to the attached education sheets on both Pneumonia and Sepsis

## 2025-05-27 NOTE — PLAN OF CARE
Orientation: A&Ox4, intermittent confusion   Aggression Stop Light: Green  Activity: A1 GBW  Diet/BS Checks: Regular   Tele:  n/a   IV Access/Drains: Lport CDI HL   Pain Management: Denied   Abnormal VS/Results: VSS on RA, K recheck in AM   Bowel/Bladder: Can be incontinent of bowel and bladder, pt declined purewick overnight   Skin/Wounds: Blanchable redness to back from a heating pad burn per pt   Consults: DONALD  D[facility]

## 2025-05-27 NOTE — PLAN OF CARE
Goal Outcome Evaluation:      Plan of Care Reviewed With: patient    Overall Patient Progress: improvingOverall Patient Progress: improving       Discharge    Patient discharged to tcu via car with daughter  Care plan note: pt admitted for sepsis secondary to Pneumonia. pt A&Ox4; A1/gb/w; RA. L chest port/HL/ port de-access before discharge. AVS/Discharge went over and given to patient&family.blanchable redness on back due to heating pad.incont of b/b.     Listed belongings gathered and given to patient (including from security/pharmacy). Yes  Care Plan and Patient education resolved: Yes  Prescriptions if needed, hard copies sent with patient  Yes  Medication Bin checked and emptied on discharge Yes  SW/care coordinator/charge RN aware of discharge: Yes

## 2025-05-27 NOTE — PROGRESS NOTES
Care Management Discharge Note    Discharge Date: 05/27/2025       Discharge Disposition: Transitional Care    Discharge Services: None    Discharge DME: None    Discharge Transportation: agency, family or friend will provide    Private pay costs discussed: private room/amenity fees    Does the patient's insurance plan have a 3 day qualifying hospital stay waiver?  No    PAS Confirmation Code: [id number]  Patient/family educated on Medicare website which has current facility and service quality ratings: yes    Education Provided on the Discharge Plan: Yes  Persons Notified of Discharge Plans: Patients nathanael Johnson   Patient/Family in Agreement with the Plan: yes    Handoff Referral Completed: No, handoff not indicated or clinically appropriate    Additional Information:    Patient will be discharged today at 1330 via family transport to [facility]. Writer talked with Antonina at [facility] who is aware and in agreement with the plan. She confirms it will be a private room. Orders faxed. Writer called nathanael Johnson and confirmed the plan for discharge for today. Elizabeth states she was shocked to hear this as this was the first time she has heard discharge. Writer did explain that we have been talking with the patient herself. Elizabeth understood. Elizabeth is aware of the private room fee and is in agreement with this plan. Elizabeth will be here at 1330 to transport her over there. PAS completed. Orders faxed. Bedside RN Aware of discharge time     PAS-RR    D: Per DHS regulation, DONALD completed and submitted PAS-RR to [place] [employer] on Aging Direct Connect via the Senior LinkAge Line.  PAS-RR confirmation # is : [id number]    P: Further questions may be directed to Senior LinkAge Line at #[phone], option #4 for PAS-RR staff.      ADRIAN King

## 2025-05-28 ENCOUNTER — TRANSITIONAL CARE UNIT VISIT (OUTPATIENT)
Dept: GERIATRICS | Facility: CLINIC | Age: 75
End: 2025-05-28
Payer: COMMERCIAL

## 2025-05-28 ENCOUNTER — PATIENT OUTREACH (OUTPATIENT)
Dept: CARE COORDINATION | Facility: CLINIC | Age: 75
End: 2025-05-28

## 2025-05-28 ENCOUNTER — LAB REQUISITION (OUTPATIENT)
Dept: LAB | Facility: CLINIC | Age: 75
End: 2025-05-28
Payer: COMMERCIAL

## 2025-05-28 ENCOUNTER — DOCUMENTATION ONLY (OUTPATIENT)
Dept: OTHER | Facility: CLINIC | Age: 75
End: 2025-05-28

## 2025-05-28 VITALS
HEART RATE: 101 BPM | BODY MASS INDEX: 31.07 KG/M2 | DIASTOLIC BLOOD PRESSURE: 81 MMHG | TEMPERATURE: 98.8 F | RESPIRATION RATE: 19 BRPM | SYSTOLIC BLOOD PRESSURE: 139 MMHG | WEIGHT: 182 LBS | OXYGEN SATURATION: 94 % | HEIGHT: 64 IN

## 2025-05-28 DIAGNOSIS — D64.9 CHRONIC ANEMIA: ICD-10-CM

## 2025-05-28 DIAGNOSIS — Z85.3 PERSONAL HISTORY OF MALIGNANT NEOPLASM OF BREAST: ICD-10-CM

## 2025-05-28 DIAGNOSIS — R19.7 DIARRHEA, UNSPECIFIED TYPE: ICD-10-CM

## 2025-05-28 DIAGNOSIS — M1A.00X0 IDIOPATHIC CHRONIC GOUT WITHOUT TOPHUS, UNSPECIFIED SITE: ICD-10-CM

## 2025-05-28 DIAGNOSIS — J18.9 COMMUNITY ACQUIRED PNEUMONIA OF RIGHT UPPER LOBE OF LUNG: Primary | ICD-10-CM

## 2025-05-28 DIAGNOSIS — Z98.2 S/P VP SHUNT: ICD-10-CM

## 2025-05-28 DIAGNOSIS — F41.8 DEPRESSION WITH ANXIETY: ICD-10-CM

## 2025-05-28 DIAGNOSIS — I89.0 LYMPHEDEMA: ICD-10-CM

## 2025-05-28 DIAGNOSIS — R53.81 PHYSICAL DECONDITIONING: ICD-10-CM

## 2025-05-28 DIAGNOSIS — J18.9 PNEUMONIA, UNSPECIFIED ORGANISM: ICD-10-CM

## 2025-05-28 DIAGNOSIS — G91.2 NPH (NORMAL PRESSURE HYDROCEPHALUS) (H): ICD-10-CM

## 2025-05-28 DIAGNOSIS — N18.2 CKD (CHRONIC KIDNEY DISEASE) STAGE 2, GFR 60-89 ML/MIN: Primary | ICD-10-CM

## 2025-05-28 DIAGNOSIS — C82.00 FOLLICULAR LYMPHOMA GRADE I, UNSPECIFIED BODY REGION (H): ICD-10-CM

## 2025-05-28 DIAGNOSIS — G93.40 ACUTE ENCEPHALOPATHY: ICD-10-CM

## 2025-05-28 DIAGNOSIS — N18.9 CHRONIC KIDNEY DISEASE, UNSPECIFIED: ICD-10-CM

## 2025-05-28 DIAGNOSIS — N18.2 CKD (CHRONIC KIDNEY DISEASE) STAGE 2, GFR 60-89 ML/MIN: ICD-10-CM

## 2025-05-28 DIAGNOSIS — E78.5 DYSLIPIDEMIA: ICD-10-CM

## 2025-05-28 DIAGNOSIS — Z86.718 PERSONAL HISTORY OF DVT (DEEP VEIN THROMBOSIS): ICD-10-CM

## 2025-05-28 PROCEDURE — 36415 COLL VENOUS BLD VENIPUNCTURE: CPT | Mod: ORL

## 2025-05-28 PROCEDURE — P9603 ONE-WAY ALLOW PRORATED MILES: HCPCS | Mod: ORL

## 2025-05-28 PROCEDURE — 99310 SBSQ NF CARE HIGH MDM 45: CPT

## 2025-05-28 PROCEDURE — 86481 TB AG RESPONSE T-CELL SUSP: CPT | Mod: ORL

## 2025-05-28 NOTE — LETTER
5/28/2025      Jessica Qiu  [address]  [facility] [address]        No notes on file      Sincerely,        IRINA Hinojosa CNP    Electronically signed   surgical history that includes Abdomen surgery; Breast surgery; lithotripsy (2010); tonsillectomy; IR Lower Extremity Venogram Left (5/28/2020); REMOVAL OF TONSILS,<13 Y/O; IR Lumbar Puncture (9/9/2013); IR Chest Port Placement > 5 Yrs of Age (3/30/2023); and IR Chest Port Placement > 5 Yrs of Age (11/22/2024).  FAMILY HISTORY: family history includes Cancer - colorectal (age of onset: 82) in her father; Cerebrovascular Disease (age of onset: 38) in her mother.  SOCIAL HISTORY:   reports that she has never smoked. She has never used smokeless tobacco. She reports current alcohol use. She reports that she does not use drugs.  Patient's living condition: lives with spouse    Post Discharge Medication Reconciliation Status:   MED REC REQUIRED  Post Medication Reconciliation Status: discharge medications reconciled and changed, per note/orders       Current Outpatient Medications   Medication Sig Dispense Refill     acetaminophen (TYLENOL) 500 MG tablet Take 500-1,000 mg by mouth every 6 hours as needed for mild pain       allopurinol (ZYLOPRIM) 300 MG tablet Take 1 tablet (300 mg) by mouth daily 90 tablet 3     calcium carbonate (OS-NICKOLAS) 500 MG tablet Take 1 tablet by mouth 2 times daily       cholecalciferol (VITAMIN D3) 25 mcg (1000 units) capsule Take 1,000 Units by mouth       citalopram (CELEXA) 20 MG tablet TAKE 1 TABLET BY MOUTH ONE TIME DAILY 30 tablet 0     clindamycin (CLEOCIN-T) 1 % external gel Apply topically 2 times daily. (Patient not taking: Reported on 5/28/2025)       diphenhydrAMINE (BENADRYL) 25 MG tablet Take 25 mg by mouth 2 times daily as needed for allergies        exemestane (AROMASIN) 25 MG tablet Take 25 mg by mouth daily.       furosemide (LASIX) 20 MG tablet Take 20 mg by mouth daily.       ipratropium - albuterol 0.5 mg/2.5 mg/3 mL (DUONEB) 0.5-2.5 (3) MG/3ML neb solution Take 1 vial (3 mLs) by nebulization every 4 hours as needed for shortness of breath, wheezing or cough.        "loperamide (IMODIUM) 2 MG capsule Take 1 capsule (2 mg) by mouth 4 times daily as needed for diarrhea.       OLANZapine (ZYPREXA) 5 MG tablet Take 5 mg by mouth See Admin Instructions. Take 1 tablet at bedime x 4 days following chemotherapy (Patient not taking: Reported on 5/28/2025)       potassium chloride tomas ER (KLOR-CON M20) 20 MEQ CR tablet Take 20 mEq by mouth 3 times daily.       rivaroxaban ANTICOAGULANT (XARELTO ANTICOAGULANT) 10 MG TABS tablet Take 1 tablet (10 mg) by mouth daily (with dinner) 90 tablet 3     rosuvastatin (CRESTOR) 5 MG tablet Take 1 tablet (5 mg) by mouth daily 30 tablet 5     No current facility-administered medications for this visit.       ROS:  4 point ROS including Respiratory, CV, GI and , other than that noted in the HPI,  is negative    Vitals:  /81   Pulse 101   Temp 98.8  F (37.1  C)   Resp 19   Ht 1.626 m (5' 4\")   Wt 82.6 kg (182 lb)   LMP  (LMP Unknown)   SpO2 94%   BMI 31.24 kg/m    Exam:  GENERAL APPEARANCE:  Alert, in no distress  RESP:  respiratory effort and palpation of chest normal, lungs clear to auscultation , no respiratory distress  CV:  regular rate and rhythm, no murmur, rub, or gallop, no edema  ABDOMEN:  normal bowel sounds, soft, nontender, no hepatosplenomegaly or other masses  M/S:   Gait and station abnormal transfers with assist  SKIN:  Inspection of skin and subcutaneous tissue baseline, Palpation of skin and subcutaneous tissue baseline  NEURO:   june freely  PSYCH:  oriented X 3, affect and mood normal    Lab/Diagnostic data:  Labs done in SNF are in [facility]. Please refer to them using [facility] Everywhere. and Recent labs in [facility] reviewed by me today.     ASSESSMENT/PLAN:    (J18.9) Community acquired pneumonia of right upper lobe of lung  (primary encounter diagnosis)  Comment: acute, resolving. Completed Zithromax. Received Rocephin with 2 days cefdinir at discharge. Respiratory status stable on RA  Plan: continue cefdinir " through 5/29. Monitor respiratory status, SPO2. Continue duonebs PRN    (C82.00) Follicular lymphoma grade I, unspecified body region (H)  Comment: chronic, follows with [facility] oncology. Last dose palliative rifuxan 5/14 q4 weeks, hold in TCU  Plan: follow-up with oncology per recommendations    (G93.40) Acute encephalopathy  Comment: secondary to infection as above, resolving  Plan: OT following for formal cog testing and safe discharge planning    (R19.7) Diarrhea, unspecified type  Comment: with associated electrolyte disturbance, secondary to abx, resolving. C diff negative.   Plan: BMP, loperamide PRN, monitor bowel habits per nursing    (G91.2) NPH (normal pressure hydrocephalus) (H)  (Z98.2) S/P  shunt  Comment: chronic by history  Plan: follow-up with NSY as directed    (I89.0) Lymphedema  Comment: chronic  Plan: continue lasix, monitor exam    (D64.9) Chronic anemia  Comment: chronic, baseline hgb 9-11  Plan: repeat CBC, monitor for s/sx bleeding    (N18.2) CKD (chronic kidney disease) stage 2, GFR 60-89 ml/min  Comment: baseline cr 0.8-1.0  Plan: Avoid nephrotoxins. Renally dose medications as appropriate. Monitor BMP periodically, repeat 5/29      (F41.8) Depression with anxiety  Comment: chronic  Plan: continue citalopram, monitor mood/symptoms, ACP PRN    (Z86.718) Personal history of DVT (deep vein thrombosis)  Comment: chronic  Plan: continue xarelto    (E87.5) Dyslipidemia  Comment: chronic  Plan: continue statin    (M1A.00X0) Idiopathic chronic gout without tophus, unspecified site  Comment: chronic, without acute exacerbation  Plan: continue allopurinol    (Z85.3) History of breast cancer  Comment: noted  Plan: continue Aromasin, follow-up with oncology as directed    (R53.81) Physical deconditioning  Comment: acute on chronic, related to acute and chronic conditions as above, recent hospitalization  Plan: PT/OT. SNF for assist with ADLs, medication management, meals, activities        Orders:  CBC,  BMP 5/12  Discontinue olanzapine in TCU  Hold clindamycin in TCU    Total time spent with patient visit at the skilled nursing facility was 48 minutes including patient visit, review of past records, and review of management with nursing facility staff.         Electronically signed by:  IRINA Hinojosa CNP                   Sincerely,        IRINA Hinojosa CNP    Electronically signed

## 2025-05-28 NOTE — PROGRESS NOTES
Connected Care Resource Center: [facility] Resource [place]    Background: Transitional Care Management program identified per system criteria and reviewed by Connected Care Resource Center team for possible outreach.    Assessment: Upon chart review, CCRC Team member will not proceed with patient outreach related to this episode of Transitional Care Management program due to reason below:    Non-[facility] TCU: CCRC team member noted patient discharged to TCU/ARU/LTACH. Patient is not established with a [facility] currently supported by Primary Care-Care Coordination therefore handoff to Primary Care-Care Coordination is not appropriate at this time.    Plan: Transitional Care Management episode addressed appropriately per reason noted above.      Tanja Mistry RN  Connected Care Resource [place], [facility]    *Connected Care Resource Team does NOT follow patient ongoing. Referrals are identified based on internal discharge reports and the outreach is to ensure patient has an understanding of their discharge instructions.

## 2025-05-28 NOTE — PROGRESS NOTES
[facility] GERIATRICS    PRIMARY CARE PROVIDER AND CLINIC:  Mansoor Melton MD, [address] [place]  / [facility] [address]***  No chief complaint on file.     [place] Medical Record Number:  [id number]  Place of Service where encounter took place:  No question data found.    Jessica Qiu  is a 74 year old  (1950) with follcular lymphoma, hydrocephalus s/p  shunt 2016, DVT on xarelto, dyslipidmia, anxiety, {fgsinitialoption:852344}.     By chart review, patient was admitted to [facility] 5/22-5/27/25 with sepsis due to pneumonia after presenting wth confusion, fever, and tachycardia. In ED, workup remarkable for RUL pna on xray, negative multiplex, negative UA. She had mild hyponatremia, hypokalemia. She received Rocephin and zithromax, completed the latter prior to discharge. Reocmmended an additional 2 days Cefdinir at discharge. She did not require supplemental oxygen.     Last dose palliative rfuxan 5/14 q4 weeks    HPI:    ***    CODE STATUS/ADVANCE DIRECTIVES DISCUSSION:  No CPR- Pre-arrest intubation OK  {CODE STATUS:831080}  ALLERGIES:   Allergies   Allergen Reactions    Statins Muscle Pain (Myalgia)     Muscle pain and stiffness    Pollen Extract      Pt. Has hayfever      PAST MEDICAL HISTORY:   Past Medical History:   Diagnosis Date    Cognitive decline 12/4/2014    Gait disorder 12/4/2014    Nephrolithiasis 12/4/2014      PAST SURGICAL HISTORY:   has a past surgical history that includes Abdomen surgery; Breast surgery; lithotripsy (2010); tonsillectomy; IR Lower Extremity Venogram Left (5/28/2020); REMOVAL OF TONSILS,<11 Y/O; IR Lumbar Puncture (9/9/2013); IR Chest Port Placement > 5 Yrs of Age (3/30/2023); and IR Chest Port Placement > 5 Yrs of Age (11/22/2024).  FAMILY HISTORY: family history includes Cancer - colorectal (age of onset: 82) in her father; Cerebrovascular Disease (age of onset: 38) in her mother.  SOCIAL HISTORY:   reports that she has never smoked. She has never used smokeless  tobacco. She reports current alcohol use. She reports that she does not use drugs.  Patient's living condition: {LIVES WITH (NURSING HOME):671001}    Post Discharge Medication Reconciliation Status:   MED REC REQUIRED{TIP  Click the link below to document or use med rec list, use list to pull in response :038665}  Post Medication Reconciliation Status: {MED REC LIST:943210}       Current Outpatient Medications   Medication Sig Dispense Refill    acetaminophen (TYLENOL) 500 MG tablet Take 500-1,000 mg by mouth every 6 hours as needed for mild pain      allopurinol (ZYLOPRIM) 300 MG tablet Take 1 tablet (300 mg) by mouth daily 90 tablet 3    calcium carbonate (OS-NICKOLAS) 500 MG tablet Take 1 tablet by mouth 2 times daily      cefdinir (OMNICEF) 300 MG capsule Take 1 capsule (300 mg) by mouth 2 times daily for 2 days.      cholecalciferol (VITAMIN D3) 25 mcg (1000 units) capsule Take 1,000 Units by mouth      citalopram (CELEXA) 20 MG tablet TAKE 1 TABLET BY MOUTH ONE TIME DAILY 30 tablet 0    clindamycin (CLEOCIN-T) 1 % external gel Apply topically 2 times daily.      diphenhydrAMINE (BENADRYL) 25 MG tablet Take 25 mg by mouth 2 times daily as needed for allergies       exemestane (AROMASIN) 25 MG tablet Take 25 mg by mouth daily.      furosemide (LASIX) 20 MG tablet Take 20 mg by mouth daily.      ipratropium - albuterol 0.5 mg/2.5 mg/3 mL (DUONEB) 0.5-2.5 (3) MG/3ML neb solution Take 1 vial (3 mLs) by nebulization every 4 hours as needed for shortness of breath, wheezing or cough.      loperamide (IMODIUM) 2 MG capsule Take 1 capsule (2 mg) by mouth 4 times daily as needed for diarrhea.      OLANZapine (ZYPREXA) 5 MG tablet Take 5 mg by mouth See Admin Instructions. Take 1 tablet at bedime x 4 days following chemotherapy      potassium chloride tomas ER (KLOR-CON M20) 20 MEQ CR tablet Take 20 mEq by mouth 3 times daily.      rivaroxaban ANTICOAGULANT (XARELTO ANTICOAGULANT) 10 MG TABS tablet Take 1 tablet (10 mg) by  mouth daily (with dinner) 90 tablet 3    rosuvastatin (CRESTOR) 5 MG tablet Take 1 tablet (5 mg) by mouth daily 30 tablet 5     No current facility-administered medications for this visit.       ROS:  {ROS FGS:746655}    Vitals:  LMP  (LMP Unknown)   Exam:  {Nursing home physical exam :197223}    Lab/Diagnostic data:  {fgslab:844498}    ASSESSMENT/PLAN:    {FGS DX2:493172}    Orders:  CBC, BMP 5/12    Electronically signed by:  IRINA Hinojosa CNP ***                 region (H)  Comment: chronic, follows with [facility] oncology. Last dose palliative rifuxan 5/14 q4 weeks, hold in TCU  Plan: follow-up with oncology per recommendations    (G93.40) Acute encephalopathy  Comment: secondary to infection as above, resolving  Plan: OT following for formal cog testing and safe discharge planning    (R19.7) Diarrhea, unspecified type  Comment: with associated electrolyte disturbance, secondary to abx, resolving. C diff negative.   Plan: BMP, loperamide PRN, monitor bowel habits per nursing    (G91.2) NPH (normal pressure hydrocephalus) (H)  (Z98.2) S/P  shunt  Comment: chronic by history  Plan: follow-up with NSY as directed    (I89.0) Lymphedema  Comment: chronic  Plan: continue lasix, monitor exam    (D64.9) Chronic anemia  Comment: chronic, baseline hgb 9-11  Plan: repeat CBC, monitor for s/sx bleeding    (N18.2) CKD (chronic kidney disease) stage 2, GFR 60-89 ml/min  Comment: baseline cr 0.8-1.0  Plan: Avoid nephrotoxins. Renally dose medications as appropriate. Monitor BMP periodically, repeat 5/29      (F41.8) Depression with anxiety  Comment: chronic  Plan: continue citalopram, monitor mood/symptoms, ACP PRN    (Z86.718) Personal history of DVT (deep vein thrombosis)  Comment: chronic  Plan: continue xarelto    (E87.5) Dyslipidemia  Comment: chronic  Plan: continue statin    (M1A.00X0) Idiopathic chronic gout without tophus, unspecified site  Comment: chronic, without acute exacerbation  Plan: continue allopurinol    (Z85.3) History of breast cancer  Comment: noted  Plan: continue Aromasin, follow-up with oncology as directed    (R53.81) Physical deconditioning  Comment: acute on chronic, related to acute and chronic conditions as above, recent hospitalization  Plan: PT/OT. SNF for assist with ADLs, medication management, meals, activities        Orders:  CBC, BMP 5/12  Discontinue olanzapine in TCU  Hold clindamycin in TCU    Total time spent with patient visit at the [facility]  facility was 48 minutes including patient visit, review of past records, and review of management with nursing facility staff.         Electronically signed by:  IRINA Hinojosa CNP

## 2025-05-28 NOTE — PROGRESS NOTES
[facility] GERIATRICS    PRIMARY CARE PROVIDER AND CLINIC:  Mansoor Melton MD, [address] [place]  / [place] [address]  Chief Complaint   Patient presents with    Hospital F/U      [place] Medical Record Number:  [id number]  Place of Service where encounter took place:  [facility]  ([facility]) [[id number]]    Jessica Qiu  is a 74 year old  (1950), admitted to the above facility from  [facility]. Hospital stay 5/22/25 through 5/27/25..   HPI:    ***    CODE STATUS/ADVANCE DIRECTIVES DISCUSSION:  No CPR- Pre-arrest intubation OK  DNR only  ALLERGIES:   Allergies   Allergen Reactions    Statins Muscle Pain (Myalgia)     Muscle pain and stiffness    Pollen Extract      Pt. Has hayfever      PAST MEDICAL HISTORY:   Past Medical History:   Diagnosis Date    Cognitive decline 12/4/2014    Gait disorder 12/4/2014    Nephrolithiasis 12/4/2014      PAST SURGICAL HISTORY:   has a past surgical history that includes Abdomen surgery; Breast surgery; lithotripsy (2010); tonsillectomy; IR Lower Extremity Venogram Left (5/28/2020); REMOVAL OF TONSILS,<11 Y/O; IR Lumbar Puncture (9/9/2013); IR Chest Port Placement > 5 Yrs of Age (3/30/2023); and IR Chest Port Placement > 5 Yrs of Age (11/22/2024).  FAMILY HISTORY: family history includes Cancer - colorectal (age of onset: 82) in her father; Cerebrovascular Disease (age of onset: 38) in her mother.  SOCIAL HISTORY:   reports that she has never smoked. She has never used smokeless tobacco. She reports current alcohol use. She reports that she does not use drugs.  Patient's living condition: lives with spouse    Post Discharge Medication Reconciliation Status:   MED REC REQUIRED{TIP  Click the link below to document or use med rec list, use list to pull in response :091524}  Post Medication Reconciliation Status: {MED REC LIST:257972}       Current Outpatient Medications   Medication Sig Dispense Refill    acetaminophen (TYLENOL) 500  "MG tablet Take 500-1,000 mg by mouth every 6 hours as needed for mild pain      allopurinol (ZYLOPRIM) 300 MG tablet Take 1 tablet (300 mg) by mouth daily 90 tablet 3    calcium carbonate (OS-NICKOLAS) 500 MG tablet Take 1 tablet by mouth 2 times daily      cefdinir (OMNICEF) 300 MG capsule Take 1 capsule (300 mg) by mouth 2 times daily for 2 days.      cholecalciferol (VITAMIN D3) 25 mcg (1000 units) capsule Take 1,000 Units by mouth      citalopram (CELEXA) 20 MG tablet TAKE 1 TABLET BY MOUTH ONE TIME DAILY 30 tablet 0    clindamycin (CLEOCIN-T) 1 % external gel Apply topically 2 times daily.      diphenhydrAMINE (BENADRYL) 25 MG tablet Take 25 mg by mouth 2 times daily as needed for allergies       exemestane (AROMASIN) 25 MG tablet Take 25 mg by mouth daily.      furosemide (LASIX) 20 MG tablet Take 20 mg by mouth daily.      ipratropium - albuterol 0.5 mg/2.5 mg/3 mL (DUONEB) 0.5-2.5 (3) MG/3ML neb solution Take 1 vial (3 mLs) by nebulization every 4 hours as needed for shortness of breath, wheezing or cough.      loperamide (IMODIUM) 2 MG capsule Take 1 capsule (2 mg) by mouth 4 times daily as needed for diarrhea.      OLANZapine (ZYPREXA) 5 MG tablet Take 5 mg by mouth See Admin Instructions. Take 1 tablet at bedime x 4 days following chemotherapy      potassium chloride tomas ER (KLOR-CON M20) 20 MEQ CR tablet Take 20 mEq by mouth 3 times daily.      rivaroxaban ANTICOAGULANT (XARELTO ANTICOAGULANT) 10 MG TABS tablet Take 1 tablet (10 mg) by mouth daily (with dinner) 90 tablet 3    rosuvastatin (CRESTOR) 5 MG tablet Take 1 tablet (5 mg) by mouth daily 30 tablet 5     No current facility-administered medications for this visit.       ROS:  {ROS FGS:472522}    Vitals:  /81   Pulse 101   Temp 98.8  F (37.1  C)   Resp 19   Ht 1.626 m (5' 4\")   Wt 82.6 kg (182 lb)   LMP  (LMP Unknown)   SpO2 94%   BMI 31.24 kg/m    Exam:  {Nursing home physical exam :993243}    Lab/Diagnostic " data:  {slab:889334}    ASSESSMENT/PLAN:    {[facility] DX2:226157}    Orders:  {fgsorders:978381}  ***    Electronically signed by:  Veronique Stuart CNA ***

## 2025-05-29 LAB
ANION GAP SERPL CALCULATED.3IONS-SCNC: 14 MMOL/L (ref 7–15)
BUN SERPL-MCNC: 10.8 MG/DL (ref 8–23)
CALCIUM SERPL-MCNC: 9.5 MG/DL (ref 8.8–10.4)
CHLORIDE SERPL-SCNC: 99 MMOL/L (ref 98–107)
CREAT SERPL-MCNC: 0.83 MG/DL (ref 0.51–0.95)
EGFRCR SERPLBLD CKD-EPI 2021: 73 ML/MIN/1.73M2
ERYTHROCYTE [DISTWIDTH] IN BLOOD BY AUTOMATED COUNT: 15.2 % (ref 10–15)
GLUCOSE SERPL-MCNC: 87 MG/DL (ref 70–99)
HCO3 SERPL-SCNC: 24 MMOL/L (ref 22–29)
HCT VFR BLD AUTO: 34 % (ref 35–47)
HGB BLD-MCNC: 10.9 G/DL (ref 11.7–15.7)
MCH RBC QN AUTO: 32.3 PG (ref 26.5–33)
MCHC RBC AUTO-ENTMCNC: 32.1 G/DL (ref 31.5–36.5)
MCV RBC AUTO: 101 FL (ref 78–100)
PLATELET # BLD AUTO: 266 10E3/UL (ref 150–450)
POTASSIUM SERPL-SCNC: 4.3 MMOL/L (ref 3.4–5.3)
RBC # BLD AUTO: 3.37 10E6/UL (ref 3.8–5.2)
SODIUM SERPL-SCNC: 137 MMOL/L (ref 135–145)
WBC # BLD AUTO: 5.8 10E3/UL (ref 4–11)

## 2025-05-29 PROCEDURE — P9604 ONE-WAY ALLOW PRORATED TRIP: HCPCS

## 2025-05-29 PROCEDURE — 80048 BASIC METABOLIC PNL TOTAL CA: CPT

## 2025-05-29 PROCEDURE — 85014 HEMATOCRIT: CPT

## 2025-05-29 PROCEDURE — 85041 AUTOMATED RBC COUNT: CPT

## 2025-05-29 PROCEDURE — 36415 COLL VENOUS BLD VENIPUNCTURE: CPT

## 2025-05-29 NOTE — PROGRESS NOTES
Writer received a call from patients daughter. Writer called her back to discuss facility and care. Directed her to the facility to further questions.     ADRIAN DE LA TORRE  [facility]  INPATIENT CARE COORDINATION

## 2025-05-30 LAB
GAMMA INTERFERON BACKGROUND BLD IA-ACNC: 0.1 IU/ML
M TB IFN-G BLD-IMP: NEGATIVE
M TB IFN-G CD4+ BCKGRND COR BLD-ACNC: 2.79 IU/ML
MITOGEN IGNF BCKGRD COR BLD-ACNC: 0.01 IU/ML
MITOGEN IGNF BCKGRD COR BLD-ACNC: 0.01 IU/ML
QUANTIFERON MITOGEN: 2.89 IU/ML
QUANTIFERON NIL TUBE: 0.1 IU/ML
QUANTIFERON TB1 TUBE: 0.11 IU/ML
QUANTIFERON TB2 TUBE: 0.11

## 2025-06-03 ENCOUNTER — DISCHARGE SUMMARY NURSING HOME (OUTPATIENT)
Dept: GERIATRICS | Facility: CLINIC | Age: 75
End: 2025-06-03
Payer: COMMERCIAL

## 2025-06-03 VITALS
SYSTOLIC BLOOD PRESSURE: 112 MMHG | OXYGEN SATURATION: 96 % | WEIGHT: 183 LBS | HEIGHT: 64 IN | TEMPERATURE: 97.7 F | RESPIRATION RATE: 16 BRPM | DIASTOLIC BLOOD PRESSURE: 76 MMHG | HEART RATE: 98 BPM | BODY MASS INDEX: 31.24 KG/M2

## 2025-06-03 DIAGNOSIS — E78.5 DYSLIPIDEMIA: ICD-10-CM

## 2025-06-03 DIAGNOSIS — Z98.2 S/P VP SHUNT: ICD-10-CM

## 2025-06-03 DIAGNOSIS — D64.9 CHRONIC ANEMIA: ICD-10-CM

## 2025-06-03 DIAGNOSIS — M1A.00X0 IDIOPATHIC CHRONIC GOUT WITHOUT TOPHUS, UNSPECIFIED SITE: ICD-10-CM

## 2025-06-03 DIAGNOSIS — F41.8 DEPRESSION WITH ANXIETY: ICD-10-CM

## 2025-06-03 DIAGNOSIS — Z86.718 PERSONAL HISTORY OF DVT (DEEP VEIN THROMBOSIS): ICD-10-CM

## 2025-06-03 DIAGNOSIS — R53.81 PHYSICAL DECONDITIONING: ICD-10-CM

## 2025-06-03 DIAGNOSIS — R41.89 COGNITIVE IMPAIRMENT: ICD-10-CM

## 2025-06-03 DIAGNOSIS — N18.2 CKD (CHRONIC KIDNEY DISEASE) STAGE 2, GFR 60-89 ML/MIN: ICD-10-CM

## 2025-06-03 DIAGNOSIS — J18.9 COMMUNITY ACQUIRED PNEUMONIA OF RIGHT UPPER LOBE OF LUNG: Primary | ICD-10-CM

## 2025-06-03 DIAGNOSIS — C82.00 FOLLICULAR LYMPHOMA GRADE I, UNSPECIFIED BODY REGION (H): ICD-10-CM

## 2025-06-03 DIAGNOSIS — Z85.3 PERSONAL HISTORY OF MALIGNANT NEOPLASM OF BREAST: ICD-10-CM

## 2025-06-03 DIAGNOSIS — I89.0 LYMPHEDEMA: ICD-10-CM

## 2025-06-03 DIAGNOSIS — G91.2 NPH (NORMAL PRESSURE HYDROCEPHALUS) (H): ICD-10-CM

## 2025-06-03 PROCEDURE — 99316 NF DSCHRG MGMT 30 MIN+: CPT

## 2025-06-03 NOTE — LETTER
6/3/2025      Jessica Qiu  [address]  [address]  [place] [address]        [facility] GERIATRICS DISCHARGE SUMMARY  PATIENT'S NAME: Jessica Qiu  YOB: 1950  MEDICAL RECORD NUMBER:  [id number]  Place of Service where encounter took place:  [facility]  (U) [[id number]]    PRIMARY CARE PROVIDER AND CLINIC RESPONSIBLE AFTER TRANSFER:   Mansoor Melton MD, [address]  / [place] [address]    Non-FMG Provider     Transferring providers: IRINA Hinojosa CNP, No Dumont MD  Recent Hospitalization/ED:  [facility] stay 5/22/25 to 5/27/25.  Date of SNF Admission: May 27, 2025  Date of SNF (anticipated) Discharge: June 05, 2025  Discharged to: previous independent home  Cognitive Scores: SLUMS: 13/30 and CPT: 4.7/5.6  Physical Function: Ambulating 450 ft with FWW  DME: No new DME needed    CODE STATUS/ADVANCE DIRECTIVES DISCUSSION:  No CPR- Pre-arrest intubation OK   ALLERGIES: Statins and Pollen extract    NURSING FACILITY COURSE   By chart review, patient was admitted to [facility] 5/22-5/27/25 with sepsis due to pneumonia after presenting wth confusion, fever, and tachycardia. In ED, workup remarkable for RUL pna on xray, negative multiplex, negative UA. She had mild hyponatremia, hypokalemia. She received Rocephin and zithromax, completed the latter prior to discharge. Reocmmended an additional 2 days Cefdinir at discharge. She did not require supplemental oxygen. She was recommended Tcu at discharge, admitted to current facility for ongoing medical management, rehab, nursing care.     Seen today in her room in TCU resting abed. Patient/family are facilitating discharge home with home care; she is independent with IADLs and she feels she is much stronger and will do well with continued therapies at discharge. She is denying CP, SOB, changes in b/b habits, fever/chills.  assists with IADLs,  driving.    Summary of nursing facility stay:   (J18.9) Community acquired pneumonia of right upper lobe of lung  (primary encounter diagnosis)  Comment: acute, resolving. Completed Zithromax. Received Rocephin with 2 days cefdinir at discharge (completed 5/29). Respiratory status stable on RA. Discontinue duonebs with no recent use.  Plan: continue cefdinir through 5/29. Monitor respiratory status, SPO2.      (C82.00) Follicular lymphoma grade I, unspecified body region (H)  Comment: chronic, follows with [facility] oncology. Last dose palliative rifuxan 5/14 q4 weeks, hold in TCU  Plan: follow-up with oncology per recommendations     (G93.40) Acute encephalopathy - resolved  (R41.89) Cognitive impairment  (primary encounter diagnosis)  Comment: secondary to infection as above, resolving. Cog scores consistent with dementia as above,  to assist with IADLs, decision making  Plan: OT home care      (R19.7) Diarrhea, unspecified type - resolved  Comment: with associated electrolyte disturbance, secondary to abx, resolving. C diff negative.   Plan: BMP, loperamide PRN, monitor bowel habits per nursing     (G91.2) NPH (normal pressure hydrocephalus) (H)  (Z98.2) S/P  shunt  Comment: chronic by history  Plan: follow-up with NSY as directed     (I89.0) Lymphedema  Comment: chronic  Plan: continue lasix, monitor exam     (D64.9) Chronic anemia  Comment: chronic, baseline hgb 9-11  Hemoglobin   Date Value Ref Range Status   05/29/2025 10.9 (L) 11.7 - 15.7 g/dL Final   05/25/2025 9.2 (L) 11.7 - 15.7 g/dL Final   10/26/2020 13.5 11.7 - 15.7 g/dL Final   06/02/2020 9.0 (L) 11.7 - 15.7 g/dL Final   Plan: repeat CBC, monitor for s/sx bleeding     (N18.2) CKD (chronic kidney disease) stage 2, GFR 60-89 ml/min  Comment: baseline cr 0.8-1.0  Plan: Avoid nephrotoxins. Renally dose medications as appropriate. 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 SNF for assist with ADLs, medication management, meals, activities       Discharge Medications:  MED REC REQUIRED  Post Medication Reconciliation Status: discharge medications reconciled and changed, per note/orders       Current Outpatient Medications   Medication Sig Dispense Refill     acetaminophen (TYLENOL) 500 MG tablet Take 1,000 mg by mouth every 6 hours as needed for mild pain.       allopurinol (ZYLOPRIM) 300 MG tablet Take 1 tablet (300 mg) by mouth daily 90 tablet 3     calcium carbonate (OS-NICKOLAS) 500 MG tablet Take 1 tablet by mouth 2 times daily       cholecalciferol (VITAMIN D3) 25 mcg (1000 units) capsule Take 1,000 Units by mouth       citalopram (CELEXA) 20 MG tablet TAKE 1 TABLET BY MOUTH ONE TIME DAILY 30 tablet 0     clindamycin (CLEOCIN-T) 1 % external gel Apply topically 2 times daily.       diphenhydrAMINE (BENADRYL) 25 MG tablet Take 25 mg by mouth 2 times daily as needed for allergies        exemestane (AROMASIN) 25 MG tablet Take 25 mg by mouth daily.       furosemide (LASIX) 20 MG tablet Take 20 mg by mouth daily.       loperamide (IMODIUM) 2 MG capsule Take 1 capsule (2 mg) by mouth 4 times daily as needed for diarrhea.       OLANZapine (ZYPREXA) 5 MG tablet Take 5 mg by mouth See Admin Instructions. Take 1 tablet at bedime x 4 days following chemotherapy       potassium chloride tomas ER  "(KLOR-CON M20) 20 MEQ CR tablet Take 20 mEq by mouth 3 times daily.       rivaroxaban ANTICOAGULANT (XARELTO ANTICOAGULANT) 10 MG TABS tablet Take 1 tablet (10 mg) by mouth daily (with dinner) 90 tablet 3     rosuvastatin (CRESTOR) 5 MG tablet Take 1 tablet (5 mg) by mouth daily 30 tablet 5          Controlled medications:   not applicable/none     Past Medical History:   Past Medical History:   Diagnosis Date     Cognitive decline 12/4/2014     Gait disorder 12/4/2014     Nephrolithiasis 12/4/2014     Physical Exam:   Vitals: /76   Pulse 98   Temp 97.7  F (36.5  C)   Resp 16   Ht 1.626 m (5' 4\")   Wt 83 kg (183 lb)   LMP  (LMP Unknown)   SpO2 96%   BMI 31.41 kg/m    BMI: Body mass index is 31.41 kg/m .  GENERAL APPEARANCE:  Alert, in no distress  RESP:  respiratory effort and palpation of chest normal, lungs clear to auscultation , no respiratory distress  CV:  regular rate and rhythm, no murmur, rub, or gallop, no edema  ABDOMEN:  normal bowel sounds, soft, nontender, no hepatosplenomegaly or other masses  M/S:   Gait and station abnormal walker for mobility  SKIN:  Inspection of skin and subcutaneous tissue baseline, Palpation of skin and subcutaneous tissue baseline  NEURO:   june freely, follows commands  PSYCH:  memory impaired , affect and mood normal     SNF labs: Labs done in SNF are in [facility]. Please refer to them using [employer]/Care Everywhere. and Recent labs in [facility] reviewed by me today.     DISCHARGE PLAN:  Follow up labs: No labs orders/due  Medical Follow Up:      Follow up with primary care provider in 1-2 weeks  Discharge Services: Home Care:  Occupational Therapy, Physical Therapy, Registered Nurse, and Home Health Aide  Discharge Instructions Verbalized to Patient at Discharge:   Continue to follow your diet:  regular.   Notify your primary care provider if you have a fever greater than 100.5 degrees.   Driving is not recommended until cleared by your providers to resume. "   24-hour supervision is recommended for safety.     TOTAL DISCHARGE TIME:   Greater than 30 minutes  Electronically signed by:  IRINA Hinojosa CNP     Home care Face to Face documentation done in [facility] attached to Home care orders for [facility].               Sincerely,        IRINA Hinojosa CNP    Electronically signed

## 2025-06-03 NOTE — PATIENT INSTRUCTIONS
Jessica Qiu  YOB: 1950                                 SSN: [id number]  [facility] MRN#:[id number]  Medical Center Admission Date: 5/27/25 Discharge Date: 6/5/2025   PHYSICIAN's DISCHARGE SUMMARY / ORDER SHEET  1. Discharge to: previous independent home with home care  2. Medications:      Current Outpatient Medications   Medication Sig Dispense Refill    acetaminophen (TYLENOL) 500 MG tablet Take 1,000 mg by mouth every 6 hours as needed for mild pain.      allopurinol (ZYLOPRIM) 300 MG tablet Take 1 tablet (300 mg) by mouth daily 90 tablet 3    calcium carbonate (OS-NICKOLAS) 500 MG tablet Take 1 tablet by mouth 2 times daily      cholecalciferol (VITAMIN D3) 25 mcg (1000 units) capsule Take 1,000 Units by mouth      citalopram (CELEXA) 20 MG tablet TAKE 1 TABLET BY MOUTH ONE TIME DAILY 30 tablet 0    clindamycin (CLEOCIN-T) 1 % external gel Apply topically 2 times daily.      diphenhydrAMINE (BENADRYL) 25 MG tablet Take 25 mg by mouth 2 times daily as needed for allergies       exemestane (AROMASIN) 25 MG tablet Take 25 mg by mouth daily.      furosemide (LASIX) 20 MG tablet Take 20 mg by mouth daily.      loperamide (IMODIUM) 2 MG capsule Take 1 capsule (2 mg) by mouth 4 times daily as needed for diarrhea.      OLANZapine (ZYPREXA) 5 MG tablet Take 5 mg by mouth See Admin Instructions. Take 1 tablet at bedime x 4 days following chemotherapy      potassium chloride tomas ER (KLOR-CON M20) 20 MEQ CR tablet Take 20 mEq by mouth 3 times daily.      rivaroxaban ANTICOAGULANT (XARELTO ANTICOAGULANT) 10 MG TABS tablet Take 1 tablet (10 mg) by mouth daily (with dinner) 90 tablet 3    rosuvastatin (CRESTOR) 5 MG tablet Take 1 tablet (5 mg) by mouth daily 30 tablet 5              DISCHARGE PLAN:  Follow up labs: No labs orders/due  Medical Follow Up:      Follow up with primary care provider in 1-2 weeks  Discharge Services: Home Care:  Occupational Therapy, Physical Therapy, Registered Nurse, and Home Health  Aide  Discharge Instructions Verbalized to Patient at Discharge:   Continue to follow your diet:  regular.   Notify your primary care provider if you have a fever greater than 100.5 degrees.   Driving is not recommended until cleared by your providers to resume.   24-hour supervision is recommended for safety.       Home care Face to Face documentation done in [facility] attached to Home care orders for [facility].     Discharge patient to home with current medications and treatments  Discharge Home with Home care as above  - Nursing call in 30 days supply of needed meds to pharmacy of choice upon discharge  - please send home with original of these discharge instructions and copy for chart.           ______________________________  Electronically signed:  IRINA Hinojosa [facility]   Department [facility]                   6/3/2025

## 2025-06-03 NOTE — PROGRESS NOTES
[facility] GERIATRICS DISCHARGE SUMMARY  PATIENT'S NAME: Jessica Qiu  YOB: 1950  MEDICAL RECORD NUMBER:  [id number]  Place of Service where encounter took place:  [facility]  (U) [[id number]]    PRIMARY CARE PROVIDER AND CLINIC RESPONSIBLE AFTER TRANSFER:   Mansoor Melton MD, [address] [address] / [place] [address]    Non-FMG Provider     Transferring providers: IRINA Hinojosa CNP, No Dumont MD  Recent Hospitalization/ED:  [facility] stay 5/22/25 to 5/27/25.  Date of SNF Admission: May 27, 2025  Date of SNF (anticipated) Discharge: June 05, 2025  Discharged to: previous independent home  Cognitive Scores: SLUMS: 13/30 and CPT: 4.7/5.6  Physical Function: Ambulating 450 ft with FWW  DME: No new DME needed    CODE STATUS/ADVANCE DIRECTIVES DISCUSSION:  No CPR- Pre-arrest intubation OK   ALLERGIES: Statins and Pollen extract    NURSING FACILITY COURSE   By chart review, patient was admitted to [facility] 5/22-5/27/25 with sepsis due to pneumonia after presenting wth confusion, fever, and tachycardia. In ED, workup remarkable for RUL pna on xray, negative multiplex, negative UA. She had mild hyponatremia, hypokalemia. She received Rocephin and zithromax, completed the latter prior to discharge. Reocmmended an additional 2 days Cefdinir at discharge. She did not require supplemental oxygen. She was recommended Tcu at discharge, admitted to current facility for ongoing medical management, rehab, nursing care.     Seen today in her room in TCU resting abed. Patient/family are facilitating discharge home with home care; she is independent with IADLs and she feels she is much stronger and will do well with continued therapies at discharge. She is denying CP, SOB, changes in b/b habits, fever/chills.  assists with IADLs, driving.    Summary of nursing facility stay:   (J18.9) Community acquired pneumonia of right upper lobe of  lung  (primary encounter diagnosis)  Comment: acute, resolving. Completed Zithromax. Received Rocephin with 2 days cefdinir at discharge (completed 5/29). Respiratory status stable on RA. Discontinue duonebs with no recent use.  Plan: continue cefdinir through 5/29. Monitor respiratory status, SPO2.      (C82.00) Follicular lymphoma grade I, unspecified body region (H)  Comment: chronic, follows with [facility] oncology. Last dose palliative rifuxan 5/14 q4 weeks, hold in TCU  Plan: follow-up with oncology per recommendations     (G93.40) Acute encephalopathy - resolved  (R41.89) Cognitive impairment  (primary encounter diagnosis)  Comment: secondary to infection as above, resolving. Cog scores consistent with dementia as above,  to assist with IADLs, decision making  Plan: OT home care      (R19.7) Diarrhea, unspecified type - resolved  Comment: with associated electrolyte disturbance, secondary to abx, resolving. C diff negative.   Plan: BMP, loperamide PRN, monitor bowel habits per nursing     (G91.2) NPH (normal pressure hydrocephalus) (H)  (Z98.2) S/P  shunt  Comment: chronic by history  Plan: follow-up with NSY as directed     (I89.0) Lymphedema  Comment: chronic  Plan: continue lasix, monitor exam     (D64.9) Chronic anemia  Comment: chronic, baseline hgb 9-11  Hemoglobin   Date Value Ref Range Status   05/29/2025 10.9 (L) 11.7 - 15.7 g/dL Final   05/25/2025 9.2 (L) 11.7 - 15.7 g/dL Final   10/26/2020 13.5 11.7 - 15.7 g/dL Final   06/02/2020 9.0 (L) 11.7 - 15.7 g/dL Final   Plan: repeat CBC, monitor for s/sx bleeding     (N18.2) CKD (chronic kidney disease) stage 2, GFR 60-89 ml/min  Comment: baseline cr 0.8-1.0  Plan: Avoid nephrotoxins. Renally dose medications as appropriate. Monitor BMP periodically, repeat 5/29     (F41.8) Depression with anxiety  Comment: chronic  Plan: continue citalopram, monitor mood/symptoms, ACP PRN     (Z86.718) Personal history of DVT (deep vein thrombosis)  Comment:  chronic  Plan: continue xarelto     (E87.5) Dyslipidemia  Comment: chronic  Plan: continue statin     (M1A.00X0) Idiopathic chronic gout without tophus, unspecified site  Comment: chronic, without acute exacerbation  Plan: continue allopurinol     (Z85.3) History of breast cancer  Comment: noted  Plan: continue Aromasin, follow-up with oncology as directed     (R53.81) Physical deconditioning  Comment: acute on chronic, related to acute and chronic conditions as above, recent hospitalization  Plan: PT/OT. SNF for assist with ADLs, medication management, meals, activities       Discharge Medications:  MED REC REQUIRED  Post Medication Reconciliation Status: discharge medications reconciled and changed, per note/orders       Current Outpatient Medications   Medication Sig Dispense Refill    acetaminophen (TYLENOL) 500 MG tablet Take 1,000 mg by mouth every 6 hours as needed for mild pain.      allopurinol (ZYLOPRIM) 300 MG tablet Take 1 tablet (300 mg) by mouth daily 90 tablet 3    calcium carbonate (OS-NICKOLAS) 500 MG tablet Take 1 tablet by mouth 2 times daily      cholecalciferol (VITAMIN D3) 25 mcg (1000 units) capsule Take 1,000 Units by mouth      citalopram (CELEXA) 20 MG tablet TAKE 1 TABLET BY MOUTH ONE TIME DAILY 30 tablet 0    clindamycin (CLEOCIN-T) 1 % external gel Apply topically 2 times daily.      diphenhydrAMINE (BENADRYL) 25 MG tablet Take 25 mg by mouth 2 times daily as needed for allergies       exemestane (AROMASIN) 25 MG tablet Take 25 mg by mouth daily.      furosemide (LASIX) 20 MG tablet Take 20 mg by mouth daily.      loperamide (IMODIUM) 2 MG capsule Take 1 capsule (2 mg) by mouth 4 times daily as needed for diarrhea.      OLANZapine (ZYPREXA) 5 MG tablet Take 5 mg by mouth See Admin Instructions. Take 1 tablet at bedime x 4 days following chemotherapy      potassium chloride tomas ER (KLOR-CON M20) 20 MEQ CR tablet Take 20 mEq by mouth 3 times daily.      rivaroxaban ANTICOAGULANT (XARELTO  "ANTICOAGULANT) 10 MG TABS tablet Take 1 tablet (10 mg) by mouth daily (with dinner) 90 tablet 3    rosuvastatin (CRESTOR) 5 MG tablet Take 1 tablet (5 mg) by mouth daily 30 tablet 5          Controlled medications:   not applicable/none     Past Medical History:   Past Medical History:   Diagnosis Date    Cognitive decline 12/4/2014    Gait disorder 12/4/2014    Nephrolithiasis 12/4/2014     Physical Exam:   Vitals: /76   Pulse 98   Temp 97.7  F (36.5  C)   Resp 16   Ht 1.626 m (5' 4\")   Wt 83 kg (183 lb)   LMP  (LMP Unknown)   SpO2 96%   BMI 31.41 kg/m    BMI: Body mass index is 31.41 kg/m .  GENERAL APPEARANCE:  Alert, in no distress  RESP:  respiratory effort and palpation of chest normal, lungs clear to auscultation , no respiratory distress  CV:  regular rate and rhythm, no murmur, rub, or gallop, no edema  ABDOMEN:  normal bowel sounds, soft, nontender, no hepatosplenomegaly or other masses  M/S:   Gait and station abnormal walker for mobility  SKIN:  Inspection of skin and subcutaneous tissue baseline, Palpation of skin and subcutaneous tissue baseline  NEURO:   june freely, follows commands  PSYCH:  memory impaired , affect and mood normal     SNF labs: [facility]. Please refer to them using [employer]/Care Everywhere. and Recent labs in [facility] reviewed by me today.     DISCHARGE PLAN:  Follow up labs: No labs orders/due  Medical Follow Up:      Follow up with primary care provider in 1-2 weeks  Discharge Services: Home Care:  Occupational Therapy, Physical Therapy, Registered Nurse, and Home Health Aide  Discharge Instructions Verbalized to Patient at Discharge:   Continue to follow your diet:  regular.   Notify your primary care provider if you have a fever greater than 100.5 degrees.   Driving is not recommended until cleared by your providers to resume.   24-hour supervision is recommended for safety.     TOTAL DISCHARGE TIME:   Greater than 30 minutes  Electronically " signed by:  IRINA Hinojosa CNP     Home care Face to Face documentation done in [facility] attached to Home care orders for [facility].

## (undated) RX ORDER — HEPARIN SODIUM 200 [USP'U]/100ML
INJECTION, SOLUTION INTRAVENOUS
Status: DISPENSED
Start: 2020-05-28

## (undated) RX ORDER — FENTANYL CITRATE 50 UG/ML
INJECTION, SOLUTION INTRAMUSCULAR; INTRAVENOUS
Status: DISPENSED
Start: 2020-05-28

## (undated) RX ORDER — LIDOCAINE HYDROCHLORIDE 10 MG/ML
INJECTION, SOLUTION INFILTRATION; PERINEURAL
Status: DISPENSED
Start: 2020-05-28

## (undated) RX ORDER — HEPARIN SODIUM 1000 [USP'U]/ML
INJECTION, SOLUTION INTRAVENOUS; SUBCUTANEOUS
Status: DISPENSED
Start: 2020-05-28